# Patient Record
Sex: FEMALE | Race: BLACK OR AFRICAN AMERICAN | Employment: OTHER | URBAN - METROPOLITAN AREA
[De-identification: names, ages, dates, MRNs, and addresses within clinical notes are randomized per-mention and may not be internally consistent; named-entity substitution may affect disease eponyms.]

---

## 2017-10-03 ENCOUNTER — HOSPITAL ENCOUNTER (OUTPATIENT)
Dept: LAB | Age: 37
Discharge: HOME | End: 2017-10-03

## 2017-10-03 PROCEDURE — 88305 TISSUE EXAM BY PATHOLOGIST: CPT | Performed by: INTERNAL MEDICINE

## 2018-04-28 ENCOUNTER — HOSPITAL ENCOUNTER (EMERGENCY)
Age: 38
Discharge: HOME | End: 2018-04-28
Attending: EMERGENCY MEDICINE
Payer: MEDICARE

## 2018-04-28 VITALS
SYSTOLIC BLOOD PRESSURE: 126 MMHG | HEART RATE: 96 BPM | BODY MASS INDEX: 30.12 KG/M2 | OXYGEN SATURATION: 99 % | DIASTOLIC BLOOD PRESSURE: 68 MMHG | WEIGHT: 170 LBS | TEMPERATURE: 98.6 F | HEIGHT: 63 IN | RESPIRATION RATE: 16 BRPM

## 2018-04-28 DIAGNOSIS — R22.0 LIP SWELLING: ICD-10-CM

## 2018-04-28 DIAGNOSIS — R59.0 LEFT CERVICAL LYMPHADENOPATHY: ICD-10-CM

## 2018-04-28 DIAGNOSIS — T78.40XA ALLERGIC REACTION, INITIAL ENCOUNTER: Primary | ICD-10-CM

## 2018-04-28 LAB — HCG UR QL: NEGATIVE

## 2018-04-28 PROCEDURE — 96372 THER/PROPH/DIAG INJ SC/IM: CPT | Performed by: EMERGENCY MEDICINE

## 2018-04-28 PROCEDURE — 81025 URINE PREGNANCY TEST: CPT

## 2018-04-28 PROCEDURE — 74011250636 HC RX REV CODE- 250/636: Performed by: EMERGENCY MEDICINE

## 2018-04-28 PROCEDURE — 99284 EMERGENCY DEPT VISIT MOD MDM: CPT | Performed by: EMERGENCY MEDICINE

## 2018-04-28 PROCEDURE — 74011250637 HC RX REV CODE- 250/637: Performed by: EMERGENCY MEDICINE

## 2018-04-28 PROCEDURE — 81003 URINALYSIS AUTO W/O SCOPE: CPT | Performed by: EMERGENCY MEDICINE

## 2018-04-28 RX ORDER — RANITIDINE 150 MG/1
150 TABLET, FILM COATED ORAL 2 TIMES DAILY
Qty: 14 TAB | Refills: 0 | Status: SHIPPED | OUTPATIENT
Start: 2018-04-28 | End: 2018-05-05

## 2018-04-28 RX ORDER — FAMOTIDINE 20 MG/1
20 TABLET, FILM COATED ORAL
Status: COMPLETED | OUTPATIENT
Start: 2018-04-28 | End: 2018-04-28

## 2018-04-28 RX ORDER — PREDNISONE 20 MG/1
60 TABLET ORAL DAILY
Qty: 12 TAB | Refills: 0 | Status: SHIPPED | OUTPATIENT
Start: 2018-04-28 | End: 2018-05-02

## 2018-04-28 RX ORDER — DIPHENHYDRAMINE HCL 25 MG
25 CAPSULE ORAL
Status: COMPLETED | OUTPATIENT
Start: 2018-04-28 | End: 2018-04-28

## 2018-04-28 RX ADMIN — METHYLPREDNISOLONE SODIUM SUCCINATE 125 MG: 125 INJECTION, POWDER, FOR SOLUTION INTRAMUSCULAR; INTRAVENOUS at 14:32

## 2018-04-28 RX ADMIN — FAMOTIDINE 20 MG: 20 TABLET, FILM COATED ORAL at 14:32

## 2018-04-28 RX ADMIN — DIPHENHYDRAMINE HYDROCHLORIDE 25 MG: 25 CAPSULE ORAL at 14:32

## 2018-04-28 NOTE — ED PROVIDER NOTES
HPI Comments: 31 yo F presents w/ c/o mild upper lip swelling that was present upon waking up this morning & tender lymph node on side of L neck that has been present for past several days. Denies voice change, stridor, wheezing, SOB, difficulty swallowing, pain with swallowing, fever, chills, CP, neck pain, tongue swelling, nausea, vomiting, dizziness, weakness, sore throat, weight loss, cough, rash. Denies history of previous allergic reactions and states she has never experienced similar symptoms in the past. Denies history of environmental, food, or medication allergies. Denies any recent new exposures. Later states she believes she had mild upper lip swelling after drinking [place] beer last night and then went to sleep. States she is not on Lisinopril and does not have history of angioedema. Patient is a 30 y.o. female presenting with facial swelling. The history is provided by the patient. Facial Swelling    The incident occurred 3 to 5 hours ago. She came to the ER via walk-in. The volume of blood lost was none. The pain is at a severity of 0/10. The patient is experiencing no pain. The pain has been improving since the injury. Pertinent negatives include no numbness, no vomiting and no weakness. There was no loss of consciousness.         Past Medical History:   Diagnosis Date    Anxiety     Anxiety, generalized     Asthma     Chronic back pain     Depression     Other unknown and unspecified cause of morbidity or mortality     tremors   headache    Postpartum depression     Sickle cell disease (HCC)     sickle cell trait       Past Surgical History:   Procedure Laterality Date     DELIVERY ONLY      first pregnancy    HX GYN      c- section    HX OTHER SURGICAL      laparoscopy for endometriosis; arthritis in neck and back         Family History:   Problem Relation Age of Onset    Arthritis-osteo Mother     Asthma Mother     Diabetes Mother     Hypertension Mother    [address] [facility] Psychiatric Disorder Mother     Hypertension Father     Asthma Brother     Lung Disease Maternal Grandmother     Cancer Maternal Grandfather     Lung Disease Maternal Grandfather     Lung Disease Paternal Grandmother     Cancer Paternal Grandfather     Lung Disease Paternal Grandfather        Social History     Social History    Marital status: SINGLE     Spouse name: N/A    Number of children: N/A    Years of education: N/A     Occupational History    Not on file. Social History Main Topics    Smoking status: Never Smoker    Smokeless tobacco: Never Used    Alcohol use No    Drug use: No    Sexual activity: No     Other Topics Concern    Not on file     Social History Narrative         ALLERGIES: Amoxicillin; Imitrex [sumatriptan succinate]; Prilosec [omeprazole]; and Sulfa (sulfonamide antibiotics)    Review of Systems   Constitutional: Negative for chills, fatigue and fever. HENT: Positive for facial swelling. Negative for congestion, drooling, rhinorrhea, sore throat, trouble swallowing and voice change. Respiratory: Negative for cough, shortness of breath, wheezing and stridor. Cardiovascular: Negative for chest pain. Gastrointestinal: Negative for abdominal pain, diarrhea, nausea and vomiting. Genitourinary: Negative for dysuria and flank pain. Musculoskeletal: Negative for myalgias, neck pain and neck stiffness. Skin: Negative for rash and wound. Allergic/Immunologic: Negative for environmental allergies and food allergies. Neurological: Negative for dizziness, syncope, weakness, numbness and headaches. Psychiatric/Behavioral: Negative for confusion. Vitals:    04/28/18 1320   BP: 133/86   Pulse: 93   Resp: 16   Temp: 98.9 °F (37.2 °C)   SpO2: 98%   Weight: 77.1 kg (170 lb)   Height: 5' 3\" (1.6 m)            Physical Exam   Constitutional: She is oriented to person, place, and time. Pt sitting up in bed in NAD. HENT:   Head: Normocephalic.    Right Ear: Hearing, tympanic membrane, external ear and ear canal normal.   Left Ear: Hearing, tympanic membrane, external ear and ear canal normal.   Mouth/Throat: Uvula is midline and oropharynx is clear and moist. No trismus in the jaw. No uvula swelling. No oropharyngeal exudate, posterior oropharyngeal edema, posterior oropharyngeal erythema or tonsillar abscesses. Uvula midline. No tonsillar erythema or exudate noted. No posterior oropharynx edema or swelling noted. No stridor or trismus present. No muffled voice. No tongue swelling. Pt tolerating secretions. Minimal/limited swelling noted to upper lip. Eyes: Conjunctivae and EOM are normal. Pupils are equal, round, and reactive to light. Neck: Normal range of motion. No JVD present. No tracheal deviation present. Tender L cervical LAD; one tender lymph node noted to cervical chain. No evidence of abscess or fluctuance. No overlying erythema or warmth. FROM. Cardiovascular: Normal rate, regular rhythm, normal heart sounds and intact distal pulses. Pulmonary/Chest: Effort normal and breath sounds normal.   CTAB. No wheezes or stridor noted. Abdominal: Soft. There is no tenderness. There is no rebound. Musculoskeletal: Normal range of motion. She exhibits no edema. Lymphadenopathy:     She has cervical adenopathy. Neurological: She is alert and oriented to person, place, and time. No cranial nerve deficit. Coordination normal.   Skin: No rash noted. No erythema. No urticaria noted. Nursing note and vitals reviewed. MDM  Number of Diagnoses or Management Options  Allergic reaction, initial encounter: minor  Left cervical lymphadenopathy: minor  Lip swelling:   Diagnosis management comments: VSS. Pt well appearing. No hx of allergic reaction in the past.  Pt with limited upper lip swelling (very minimal swelling noted). No other edema/swelling noted to lower lip, tongue, oropharynx. No rash noted on exam; no urticaria.  No wheezing, stridor. Lungs CTAB. Pt administered Solu-Medrol 125mg IM, Pepcid, and Benadryl. Pt monitored for >2 hours with improvement of symptoms. Will d/c home with Prednisone taper and Zantac. Pt given strict return precautions and things to look out for.        Amount and/or Complexity of Data Reviewed  Clinical lab tests: ordered and reviewed  Review and summarize past medical records: yes    Risk of Complications, Morbidity, and/or Mortality  Presenting problems: low  Diagnostic procedures: low  Management options: low    Patient Progress  Patient progress: stable        ED Course       Procedures    ,  Results Include:    Recent Results (from the past 24 hour(s))   HCG URINE, QL. - POC    Collection Time: 04/28/18  2:25 PM   Result Value Ref Range    Pregnancy test,urine (POC) NEGATIVE  NEG

## 2018-04-28 NOTE — DISCHARGE INSTRUCTIONS
Swollen Lymph Nodes: Care Instructions  Your Care Instructions    Lymph nodes are small, bean-shaped glands throughout the body. They help your body fight germs and infections. Lymph nodes often swell when there is a problem such as an injury, infection, or tumor. · The nodes in your neck, under your chin, or behind your ears may swell when you have a cold or sore throat. · An injury or infection in a leg or foot can make the nodes in your groin swell. · Sometimes medicine can make lymph nodes swell, but this is rare. Treatment depends on what caused your nodes to swell. Usually the nodes return to normal size without a problem. Follow-up care is a key part of your treatment and safety. Be sure to make and go to all appointments, and call your doctor if you are having problems. It's also a good idea to know your test results and keep a list of the medicines you take. How can you care for yourself at home? · Take your medicines exactly as prescribed. Call your doctor if you think you are having a problem with your medicine. · Avoid irritation. ¨ Do not squeeze or pick at the lump. ¨ Do not stick a needle in it. · Prevent infection. Do not squeeze, drain, or puncture a painful lump. Doing this can irritate or inflame the lump, push any existing infection deeper into the skin, or cause severe bleeding. · Get extra rest. Slow down just a little from your usual routine. · Drink plenty of fluids, enough so that your urine is light yellow or clear like water. If you have kidney, heart, or liver disease and have to limit fluids, talk with your doctor before you increase the amount of fluids you drink. · Take an over-the-counter pain medicine, such as acetaminophen (Tylenol), ibuprofen (Advil, Motrin), or naproxen (Aleve). Read and follow all instructions on the label. · Do not take two or more pain medicines at the same time unless the doctor told you to.  Many pain medicines have acetaminophen, which is Tylenol. Too much acetaminophen (Tylenol) can be harmful. When should you call for help? Call your doctor now or seek immediate medical care if:  ? · You have worse symptoms of infection, such as:  ¨ Increased pain, swelling, warmth, or redness. ¨ Red streaks leading from the area. ¨ Pus draining from the area. ¨ A fever. ? Watch closely for changes in your health, and be sure to contact your doctor if:  ? · Your lymph nodes do not get smaller or do not return to normal.   ? · You do not get better as expected. Where can you learn more? Go to http://krystal-fausto.info/. Enter E739 in the search box to learn more about \"Swollen Lymph Nodes: Care Instructions. \"  Current as of: March 3, 2017  Content Version: 11.4  © 1327-9798 [employer]. Care instructions adapted under license by [employer] (which disclaims liability or warranty for this information). If you have questions about a medical condition or this instruction, always ask your healthcare professional. [place] [address] any warranty or liability for your use of this information. Allergic Reaction: Care Instructions  Your Care Instructions    An allergic reaction is an excessive response from your immune system to a medicine, chemical, food, insect bite, or other substance. A reaction can range from mild to life-threatening. Some people have a mild rash, hives, and itching or stomach cramps. In severe reactions, swelling of your tongue and throat can close up your airway so that you cannot breathe. Follow-up care is a key part of your treatment and safety. Be sure to make and go to all appointments, and call your doctor if you are having problems. It's also a good idea to know your test results and keep a list of the medicines you take. How can you care for yourself at home? · If you know what caused your allergic reaction, be sure to avoid it.  Your allergy may become more severe each time you have a reaction. · Take an over-the-counter antihistamine, such as cetirizine (Zyrtec) or loratadine (Claritin), to treat mild symptoms. Read and follow directions on the label. Some antihistamines can make you feel sleepy. Do not give antihistamines to a child unless you have checked with your doctor first. Mild symptoms include sneezing or an itchy or runny nose; an itchy mouth; a few hives or mild itching; and mild nausea or stomach discomfort. · Do not scratch hives or a rash. Put a cold, moist towel on them or take cool baths to relieve itching. Put ice packs on hives, swelling, or insect stings for 10 to 15 minutes at a time. Put a thin cloth between the ice pack and your skin. Do not take hot baths or showers. They will make the itching worse. · Your doctor may prescribe a shot of epinephrine to carry with you in case you have a severe reaction. Learn how to give yourself the shot and keep it with you at all times. Make sure it is not . · Go to the emergency room every time you have a severe reaction, even if you have used your shot of epinephrine and are feeling better. Symptoms can come back after a shot. · Wear medical alert jewelry that lists your allergies. You can buy this at most [employer]. · If your child has a severe allergy, make sure that his or her teachers, babysitters, coaches, and other caregivers know about the allergy. They should have an epinephrine shot, know how and when to give it, and have a plan to take your child to the hospital.  When should you call for help? Give an epinephrine shot if:  ? · You think you are having a severe allergic reaction. ? · You have symptoms in more than one body area, such as mild nausea and an itchy mouth. ? After giving an epinephrine shot call 911, even if you feel better. ?Call 911 if:  ? · You have symptoms of a severe allergic reaction.  These may include:  ¨ Sudden raised, red areas (hives) all over your body.  ¨ Swelling of the throat, mouth, lips, or tongue. ¨ Trouble breathing. ¨ Passing out (losing consciousness). Or you may feel very lightheaded or suddenly feel weak, confused, or restless. ? · You have been given an epinephrine shot, even if you feel better. ?Call your doctor now or seek immediate medical care if:  ? · You have symptoms of an allergic reaction, such as:  ¨ A rash or hives (raised, red areas on the skin). ¨ Itching. ¨ Swelling. ¨ Belly pain, nausea, or vomiting. ? Watch closely for changes in your health, and be sure to contact your doctor if:  ? · You do not get better as expected. Where can you learn more? Go to http://krystal-fausto.info/. Enter R476 in the search box to learn more about \"Allergic Reaction: Care Instructions. \"  Current as of: September 29, 2016  Content Version: 11.4  © 2818-4116 [employer] Care instructions adapted under license by [employer] (which disclaims liability or warranty for this information). If you have questions about a medical condition or this instruction, always ask your healthcare professional. [place] [address] any warranty or liability for your use of this information.

## 2018-04-28 NOTE — ED TRIAGE NOTES
Patient complaining of swelling to upper lip that was present when she woke up, advises \"itching\" to tongue and throat. Patient denies any shortness of breath at this time. Patient advises she noted knot present below left ear yesterday and states she has been having a knot under right arm for a \"while\".

## 2018-04-28 NOTE — ED NOTES
I have reviewed discharge instructions with the patient. The patient verbalized understanding. Patient left ED via Discharge Method: ambulatory to Home with self. Opportunity for questions and clarification provided. Patient given 2 scripts. To continue your aftercare when you leave the hospital, you may receive an automated call from our care team to check in on how you are doing. This is a free service and part of our promise to provide the best care and service to meet your aftercare needs.  If you have questions, or wish to unsubscribe from this service please call [phone]. Thank you for Choosing our [facility] Emergency Department.

## 2018-11-06 ENCOUNTER — HOSPITAL ENCOUNTER (EMERGENCY)
Age: 38
Discharge: HOME | End: 2018-11-06
Attending: EMERGENCY MEDICINE
Payer: MEDICARE

## 2018-11-06 VITALS
TEMPERATURE: 97.8 F | HEIGHT: 63 IN | DIASTOLIC BLOOD PRESSURE: 86 MMHG | SYSTOLIC BLOOD PRESSURE: 132 MMHG | RESPIRATION RATE: 16 BRPM | WEIGHT: 170 LBS | OXYGEN SATURATION: 100 % | BODY MASS INDEX: 30.12 KG/M2 | HEART RATE: 82 BPM

## 2018-11-06 DIAGNOSIS — M54.50 ACUTE MIDLINE LOW BACK PAIN WITHOUT SCIATICA: Primary | ICD-10-CM

## 2018-11-06 LAB
BACTERIA URNS QL MICRO: 0 /HPF
CASTS URNS QL MICRO: NORMAL /LPF
EPI CELLS #/AREA URNS HPF: NORMAL /HPF
HCG UR QL: NEGATIVE
RBC #/AREA URNS HPF: 0 /HPF
WBC URNS QL MICRO: NORMAL /HPF

## 2018-11-06 PROCEDURE — 99284 EMERGENCY DEPT VISIT MOD MDM: CPT | Performed by: EMERGENCY MEDICINE

## 2018-11-06 PROCEDURE — 81003 URINALYSIS AUTO W/O SCOPE: CPT | Performed by: EMERGENCY MEDICINE

## 2018-11-06 PROCEDURE — 74011250636 HC RX REV CODE- 250/636: Performed by: EMERGENCY MEDICINE

## 2018-11-06 PROCEDURE — 81001 URINALYSIS AUTO W/SCOPE: CPT

## 2018-11-06 PROCEDURE — 81025 URINE PREGNANCY TEST: CPT

## 2018-11-06 PROCEDURE — 74011250637 HC RX REV CODE- 250/637: Performed by: EMERGENCY MEDICINE

## 2018-11-06 PROCEDURE — 87591 N.GONORRHOEAE DNA AMP PROB: CPT

## 2018-11-06 PROCEDURE — 96372 THER/PROPH/DIAG INJ SC/IM: CPT | Performed by: EMERGENCY MEDICINE

## 2018-11-06 RX ORDER — AZITHROMYCIN 250 MG/1
1000 TABLET, FILM COATED ORAL
Status: COMPLETED | OUTPATIENT
Start: 2018-11-06 | End: 2018-11-06

## 2018-11-06 RX ORDER — TRAMADOL HYDROCHLORIDE 50 MG/1
50 TABLET ORAL
Qty: 12 TAB | Refills: 0 | Status: SHIPPED | OUTPATIENT
Start: 2018-11-06 | End: 2019-12-16

## 2018-11-06 RX ORDER — DEXLANSOPRAZOLE 60 MG/1
CAPSULE, DELAYED RELEASE ORAL DAILY
COMMUNITY

## 2018-11-06 RX ORDER — PROPRANOLOL HYDROCHLORIDE 20 MG/1
20 TABLET ORAL 3 TIMES DAILY
COMMUNITY

## 2018-11-06 RX ORDER — LOVASTATIN 20 MG/1
20 TABLET ORAL DAILY
COMMUNITY

## 2018-11-06 RX ORDER — METHOCARBAMOL 750 MG/1
750 TABLET, FILM COATED ORAL
Qty: 20 TAB | Refills: 0 | Status: SHIPPED | OUTPATIENT
Start: 2018-11-06 | End: 2019-12-16

## 2018-11-06 RX ADMIN — AZITHROMYCIN 1000 MG: 250 TABLET, FILM COATED ORAL at 12:04

## 2018-11-06 RX ADMIN — LIDOCAINE HYDROCHLORIDE 250 MG: 10 INJECTION, SOLUTION EPIDURAL; INFILTRATION; INTRACAUDAL; PERINEURAL at 12:04

## 2018-11-06 NOTE — ED PROVIDER NOTES
The history is provided by the patient. Back Pain This is a new problem. The current episode started 12 to 24 hours ago. The problem has been gradually worsening. The problem occurs constantly. Patient reports not work related injury. The pain is associated with no known injury. The pain is present in the lower back. The quality of the pain is described as sharp and aching. The pain does not radiate. The pain is moderate. The symptoms are aggravated by bending, twisting and certain positions. The pain is the same all the time. Stiffness is present all day. Associated symptoms include dysuria (slight). Pertinent negatives include no chest pain, no fever, no numbness, no weight loss, no headaches, no abdominal pain, no abdominal swelling, no bowel incontinence, no perianal numbness, no bladder incontinence, no pelvic pain, no leg pain, no paresthesias, no paresis, no tingling and no weakness. She has tried bed rest for the symptoms. The treatment provided no relief. The patient's surgical history non-contributory Past Medical History:  
Diagnosis Date  Anxiety  Anxiety, generalized  Asthma  Chronic back pain  Depression  Other unknown and unspecified cause of morbidity or mortality   
 tremors   headache  Postpartum depression  Sickle cell disease ([facility] [address] sickle cell trait Past Surgical History:  
Procedure Laterality Date   DELIVERY ONLY    
 first pregnancy  HX CHOLECYSTECTOMY  HX GYN    
 c- section  HX OTHER SURGICAL    
 laparoscopy for endometriosis; arthritis in neck and back Family History:  
Problem Relation Age of Onset Shellie Means Arthritis-osteo Mother  Asthma Mother  Diabetes Mother  Hypertension Mother  Psychiatric Disorder Mother  Hypertension Father  Asthma Brother  Lung Disease Maternal Grandmother  Cancer Maternal Grandfather  Lung Disease Maternal Grandfather  Lung Disease Paternal Grandmother  Cancer Paternal Grandfather  Lung Disease Paternal Grandfather Social History Socioeconomic History  Marital status: SINGLE Spouse name: Not on file  Number of children: Not on file  Years of education: Not on file  Highest education level: Not on file Social Needs  Financial resource strain: Not on file  Food insecurity - worry: Not on file  Food insecurity - inability: Not on file  Transportation needs - medical: Not on file  Transportation needs - non-medical: Not on file Occupational History  Not on file Tobacco Use  Smoking status: Never Smoker  Smokeless tobacco: Never Used Substance and Sexual Activity  Alcohol use: No  
 Drug use: No  
 Sexual activity: No  
  Partners: Male Birth control/protection: None, Injection Other Topics Concern  Not on file Social History Narrative  Not on file ALLERGIES: Amoxicillin; Imitrex [sumatriptan succinate]; Prilosec [omeprazole]; and Sulfa (sulfonamide antibiotics) Review of Systems Constitutional: Negative for chills, fever and weight loss. Cardiovascular: Negative for chest pain. Gastrointestinal: Negative for abdominal pain and bowel incontinence. Genitourinary: Positive for dysuria (slight). Negative for bladder incontinence, pelvic pain, vaginal bleeding, vaginal discharge and vaginal pain. Musculoskeletal: Positive for back pain. Neurological: Negative for tingling, weakness, numbness, headaches and paresthesias. All other systems reviewed and are negative. Vitals:  
 11/06/18 1035 BP: 137/88 Pulse: 85 Resp: 16 Temp: 98.2 °F (36.8 °C) SpO2: 99% Weight: 77.1 kg (170 lb) Height: 5' 3\" (1.6 m) Physical Exam  
Constitutional: She is oriented to person, place, and time. She appears well-developed and well-nourished. No distress. HENT:  
Head: Normocephalic and atraumatic. Right Ear: External ear normal.  
Left Ear: External ear normal.  
Mouth/Throat: Oropharynx is clear and moist.  
Eyes: Conjunctivae and EOM are normal. Pupils are equal, round, and reactive to light. Neck: Normal range of motion. Neck supple. Cardiovascular: Normal rate, regular rhythm, normal heart sounds and intact distal pulses. Pulmonary/Chest: Effort normal and breath sounds normal.  
Abdominal: Soft. Bowel sounds are normal. There is no tenderness. Musculoskeletal: She exhibits no edema. Lumbar back: She exhibits decreased range of motion and tenderness. She exhibits no swelling, no edema, no deformity, no laceration, no spasm and normal pulse. Neurological: She is alert and oriented to person, place, and time. She has normal strength. She displays normal reflexes. No cranial nerve deficit or sensory deficit. Coordination and gait normal.  
Negative straight leg raise bilaterally Skin: Skin is warm and dry. Capillary refill takes less than 2 seconds. Psychiatric: She has a normal mood and affect. Nursing note and vitals reviewed. MDM Number of Diagnoses or Management Options Acute midline low back pain without sciatica: new and requires workup Amount and/or Complexity of Data Reviewed Clinical lab tests: ordered and reviewed Review and summarize past medical records: yes Risk of Complications, Morbidity, and/or Mortality Presenting problems: moderate Diagnostic procedures: minimal 
Management options: moderate Patient Progress Patient progress: stable Procedures Results Reviewed: 
 
 
Recent Results (from the past 24 hour(s)) HCG URINE, QL. - POC Collection Time: 11/06/18 10:50 AM  
Result Value Ref Range Pregnancy test,urine (POC) NEGATIVE  NEG    
URINE MICROSCOPIC Collection Time: 11/06/18 10:52 AM  
Result Value Ref Range WBC 0-3 0 /hpf  
 RBC 0 0 /hpf Epithelial cells 0-3 0 /hpf Bacteria 0 0 /hpf Casts 0-3 0 /lpf I discussed the results of all labs, procedures, radiographs, and treatments with the patient and available family. Treatment plan is agreed upon and the patient is ready for discharge. All voiced understanding of the discharge plan and medication instructions or changes as appropriate. Questions about treatment in the ED were answered. All were encouraged to return should symptoms worsen or new problems develop.

## 2018-11-06 NOTE — ED NOTES
I have reviewed discharge instructions with the patient. The patient verbalized understanding. Patient left ED via Discharge Method: ambulatory to Home with self. Opportunity for questions and clarification provided. Patient given 2 scripts. To continue your aftercare when you leave the hospital, you may receive an automated call from our care team to check in on how you are doing. This is a free service and part of our promise to provide the best care and service to meet your aftercare needs.  If you have questions, or wish to unsubscribe from this service please call [phone]. Thank you for Choosing our [facility] Emergency Department.

## 2018-11-06 NOTE — DISCHARGE INSTRUCTIONS
Learning About Relief for Back Pain  What is back tension and strain? Back strain happens when you overstretch, or pull, a muscle in your back. You may hurt your back in an accident or when you exercise or lift something. Most back pain will get better with rest and time. You can take care of yourself at home to help your back heal.  What can you do first to relieve back pain? When you first feel back pain, try these steps:  · Walk. Take a short walk (10 to 20 minutes) on a level surface (no slopes, hills, or stairs) every 2 to 3 hours. Walk only distances you can manage without pain, especially leg pain. · Relax. Find a comfortable position for rest. Some people are comfortable on the floor or a medium-firm bed with a small pillow under their head and another under their knees. Some people prefer to lie on their side with a pillow between their knees. Don't stay in one position for too long. · Try heat or ice. Try using a heating pad on a low or medium setting, or take a warm shower, for 15 to 20 minutes every 2 to 3 hours. Or you can buy single-use heat wraps that last up to 8 hours. You can also try an ice pack for 10 to 15 minutes every 2 to 3 hours. You can use an ice pack or a bag of frozen vegetables wrapped in a thin towel. There is not strong evidence that either heat or ice will help, but you can try them to see if they help. You may also want to try switching between heat and cold. · Take pain medicine exactly as directed. ¨ If the doctor gave you a prescription medicine for pain, take it as prescribed. ¨ If you are not taking a prescription pain medicine, ask your doctor if you can take an over-the-counter medicine. What else can you do? · Stretch and exercise. Exercises that increase flexibility may relieve your pain and make it easier for your muscles to keep your spine in a good, neutral position. And don't forget to keep walking. · Do self-massage.  You can use self-massage to unwind after work or school or to energize yourself in the morning. You can easily massage your feet, hands, or neck. Self-massage works best if you are in comfortable clothes and are sitting or lying in a comfortable position. Use oil or lotion to massage bare skin. · Reduce stress. Back pain can lead to a vicious [place]: Distress about the pain tenses the muscles in your back, which in turn causes more pain. Learn how to relax your mind and your muscles to lower your stress. Where can you learn more? Go to http://krystal-fausto.info/. Enter J845 in the search box to learn more about \"Learning About Relief for Back Pain. \"  Current as of: March 21, 2017  Content Version: 11.5  © 0091-1516 [employer]. Care instructions adapted under license by [employer] (which disclaims liability or warranty for this information). If you have questions about a medical condition or this instruction, always ask your healthcare professional. [place] [address] any warranty or liability for your use of this information. Low Back Pain: Exercises  Your Care Instructions  Here are some examples of typical rehabilitation exercises for your condition. Start each exercise slowly. Ease off the exercise if you start to have pain. Your doctor or physical therapist will tell you when you can start these exercises and which ones will work best for you. How to do the exercises  Press-up    1. Lie on your stomach, supporting your body with your forearms. 2. Press your elbows down into the floor to raise your upper back. As you do this, relax your stomach muscles and allow your back to arch without using your back muscles. As your press up, do not let your hips or pelvis come off the floor. 3. Hold for 15 to 30 seconds, then relax. 4. Repeat 2 to 4 times. Alternate arm and leg (bird dog) exercise    1. Start on the floor, on your hands and knees.   2. Tighten your belly muscles. 3. Raise one leg off the floor, and hold it straight out behind you. Be careful not to let your hip drop down, because that will twist your trunk. 4. Hold for about 6 seconds, then lower your leg and switch to the other leg. 5. Repeat 8 to 12 times on each leg. 6. Over time, work up to holding for 10 to 30 seconds each time. 7. If you feel stable and secure with your leg raised, try raising the opposite arm straight out in front of you at the same time. Knee-to-chest exercise    1. Lie on your back with your knees bent and your feet flat on the floor. 2. Bring one knee to your chest, keeping the other foot flat on the floor (or keeping the other leg straight, whichever feels better on your lower back). 3. Keep your lower back pressed to the floor. Hold for at least 15 to 30 seconds. 4. Relax, and lower the knee to the starting position. 5. Repeat with the other leg. Repeat 2 to 4 times with each leg. 6. To get more stretch, put your other leg flat on the floor while pulling your knee to your chest.    Curl-ups    1. Lie on the floor on your back with your knees bent at a 90-degree angle. Your feet should be flat on the floor, about 12 inches from your buttocks. 2. Cross your arms over your chest. If this bothers your neck, try putting your hands behind your neck (not your head), with your elbows spread apart. 3. Slowly tighten your belly muscles and raise your shoulder blades off the floor. 4. Keep your head in line with your body, and do not press your chin to your chest.  5. Hold this position for 1 or 2 seconds, then slowly lower yourself back down to the floor. 6. Repeat 8 to 12 times. Pelvic tilt exercise    1. Lie on your back with your knees bent. 2. \"Brace\" your stomach. This means to tighten your muscles by pulling in and imagining your belly button moving toward your spine.  You should feel like your back is pressing to the floor and your hips and pelvis are rocking back.  3. Hold for about 6 seconds while you breathe smoothly. 4. Repeat 8 to 12 times. Heel dig bridging    1. Lie on your back with both knees bent and your ankles bent so that only your heels are digging into the floor. Your knees should be bent about 90 degrees. 2. Then push your heels into the floor, squeeze your buttocks, and lift your hips off the floor until your shoulders, hips, and knees are all in a straight line. 3. Hold for about 6 seconds as you continue to breathe normally, and then slowly lower your hips back down to the floor and rest for up to 10 seconds. 4. Do 8 to 12 repetitions. Hamstring stretch in doorway    1. Lie on your back in a doorway, with one leg through the open door. 2. Slide your leg up the wall to straighten your knee. You should feel a gentle stretch down the back of your leg. 3. Hold the stretch for at least 15 to 30 seconds. Do not arch your back, point your toes, or bend either knee. Keep one heel touching the floor and the other heel touching the wall. 4. Repeat with your other leg. 5. Do 2 to 4 times for each leg. Hip flexor stretch    1. Kneel on the floor with one knee bent and one leg behind you. Place your forward knee over your foot. Keep your other knee touching the floor. 2. Slowly push your hips forward until you feel a stretch in the upper thigh of your rear leg. 3. Hold the stretch for at least 15 to 30 seconds. Repeat with your other leg. 4. Do 2 to 4 times on each side. Wall sit    1. Stand with your back 10 to 12 inches away from a wall. 2. Lean into the wall until your back is flat against it. 3. Slowly slide down until your knees are slightly bent, pressing your lower back into the wall. 4. Hold for about 6 seconds, then slide back up the wall. 5. Repeat 8 to 12 times. Follow-up care is a key part of your treatment and safety. Be sure to make and go to all appointments, and call your doctor if you are having problems.  It's also a good idea to know your test results and keep a list of the medicines you take. Where can you learn more? Go to http://krystal-fausto.info/. Enter A639 in the search box to learn more about \"Low Back Pain: Exercises. \"  Current as of: November 29, 2017  Content Version: 11.8  © 1071-1851 [employer], [employer]. Care instructions adapted under license by [employer] (which disclaims liability or warranty for this information). If you have questions about a medical condition or this instruction, always ask your healthcare professional. [address] any warranty or liability for your use of this information.

## 2018-11-08 LAB
C TRACH RRNA SPEC QL NAA+PROBE: NEGATIVE
N GONORRHOEA RRNA SPEC QL NAA+PROBE: NEGATIVE
SPECIMEN SOURCE: NORMAL

## 2019-05-15 ENCOUNTER — HOSPITAL ENCOUNTER (EMERGENCY)
Age: 39
Discharge: LEFT BEFORE DISCHARGE | End: 2019-05-16
Attending: EMERGENCY MEDICINE
Payer: MEDICARE

## 2019-05-15 PROCEDURE — 75810000275 HC EMERGENCY DEPT VISIT NO LEVEL OF CARE: Performed by: EMERGENCY MEDICINE

## 2019-07-18 PROCEDURE — 88312 SPECIAL STAINS GROUP 1: CPT

## 2019-07-18 PROCEDURE — 88305 TISSUE EXAM BY PATHOLOGIST: CPT

## 2019-07-19 ENCOUNTER — HOSPITAL ENCOUNTER (OUTPATIENT)
Dept: LAB | Age: 39
Discharge: HOME | End: 2019-07-19

## 2019-12-16 ENCOUNTER — HOSPITAL ENCOUNTER (EMERGENCY)
Age: 39
Discharge: HOME | End: 2019-12-16
Attending: EMERGENCY MEDICINE
Payer: MEDICARE

## 2019-12-16 VITALS
SYSTOLIC BLOOD PRESSURE: 110 MMHG | WEIGHT: 165 LBS | BODY MASS INDEX: 29.23 KG/M2 | HEIGHT: 63 IN | OXYGEN SATURATION: 98 % | RESPIRATION RATE: 18 BRPM | HEART RATE: 68 BPM | TEMPERATURE: 98.4 F | DIASTOLIC BLOOD PRESSURE: 74 MMHG

## 2019-12-16 DIAGNOSIS — B96.89 BV (BACTERIAL VAGINOSIS): ICD-10-CM

## 2019-12-16 DIAGNOSIS — N76.0 BV (BACTERIAL VAGINOSIS): ICD-10-CM

## 2019-12-16 DIAGNOSIS — N72 CERVICITIS: ICD-10-CM

## 2019-12-16 DIAGNOSIS — R82.998 URINE WBC INCREASED: ICD-10-CM

## 2019-12-16 DIAGNOSIS — B37.9 YEAST INFECTION: Primary | ICD-10-CM

## 2019-12-16 LAB
BACTERIA URNS QL MICRO: NORMAL /HPF
CASTS URNS QL MICRO: 0 /LPF
CRYSTALS URNS QL MICRO: 0 /LPF
EPI CELLS #/AREA URNS HPF: NORMAL /HPF
HCG UR QL: NEGATIVE
MUCOUS THREADS URNS QL MICRO: 0 /LPF
RBC #/AREA URNS HPF: NORMAL /HPF
SERVICE CMNT-IMP: NORMAL
WBC URNS QL MICRO: >100 /HPF
WET PREP GENITAL: NORMAL
YEAST URNS QL MICRO: NORMAL

## 2019-12-16 PROCEDURE — 74011000250 HC RX REV CODE- 250: Performed by: PHYSICIAN ASSISTANT

## 2019-12-16 PROCEDURE — 96372 THER/PROPH/DIAG INJ SC/IM: CPT | Performed by: PHYSICIAN ASSISTANT

## 2019-12-16 PROCEDURE — 81025 URINE PREGNANCY TEST: CPT

## 2019-12-16 PROCEDURE — 74011250636 HC RX REV CODE- 250/636: Performed by: PHYSICIAN ASSISTANT

## 2019-12-16 PROCEDURE — 81003 URINALYSIS AUTO W/O SCOPE: CPT | Performed by: PHYSICIAN ASSISTANT

## 2019-12-16 PROCEDURE — 86695 HERPES SIMPLEX TYPE 1 TEST: CPT

## 2019-12-16 PROCEDURE — 99285 EMERGENCY DEPT VISIT HI MDM: CPT | Performed by: PHYSICIAN ASSISTANT

## 2019-12-16 PROCEDURE — 81015 MICROSCOPIC EXAM OF URINE: CPT

## 2019-12-16 PROCEDURE — 86694 HERPES SIMPLEX NES ANTBDY: CPT

## 2019-12-16 PROCEDURE — 87210 SMEAR WET MOUNT SALINE/INK: CPT

## 2019-12-16 PROCEDURE — 87491 CHLMYD TRACH DNA AMP PROBE: CPT

## 2019-12-16 PROCEDURE — 74011250637 HC RX REV CODE- 250/637: Performed by: PHYSICIAN ASSISTANT

## 2019-12-16 PROCEDURE — 87086 URINE CULTURE/COLONY COUNT: CPT

## 2019-12-16 RX ORDER — AZITHROMYCIN 250 MG/1
1000 TABLET, FILM COATED ORAL
Status: COMPLETED | OUTPATIENT
Start: 2019-12-16 | End: 2019-12-16

## 2019-12-16 RX ORDER — METRONIDAZOLE 500 MG/1
500 TABLET ORAL 2 TIMES DAILY
Qty: 20 TAB | Refills: 0 | Status: SHIPPED | OUTPATIENT
Start: 2019-12-16 | End: 2019-12-26

## 2019-12-16 RX ORDER — NITROFURANTOIN 25; 75 MG/1; MG/1
100 CAPSULE ORAL 2 TIMES DAILY
Qty: 20 CAP | Refills: 0 | Status: SHIPPED | OUTPATIENT
Start: 2019-12-16 | End: 2019-12-26

## 2019-12-16 RX ORDER — FLUCONAZOLE 150 MG/1
150 TABLET ORAL
Qty: 2 TAB | Refills: 0 | Status: SHIPPED | OUTPATIENT
Start: 2019-12-16 | End: 2019-12-24

## 2019-12-16 RX ORDER — ACYCLOVIR 800 MG/1
800 TABLET ORAL
Qty: 50 TAB | Refills: 0 | Status: SHIPPED | OUTPATIENT
Start: 2019-12-16 | End: 2019-12-26

## 2019-12-16 RX ORDER — ONDANSETRON 4 MG/1
4 TABLET, ORALLY DISINTEGRATING ORAL
Status: COMPLETED | OUTPATIENT
Start: 2019-12-16 | End: 2019-12-16

## 2019-12-16 RX ORDER — BUPRENORPHINE 5 UG/H
1 PATCH TRANSDERMAL
COMMUNITY
End: 2022-01-13

## 2019-12-16 RX ADMIN — LIDOCAINE HYDROCHLORIDE 250 MG: 10 INJECTION, SOLUTION INFILTRATION; PERINEURAL at 10:08

## 2019-12-16 RX ADMIN — AZITHROMYCIN MONOHYDRATE 1000 MG: 250 TABLET ORAL at 10:07

## 2019-12-16 RX ADMIN — ONDANSETRON 4 MG: 4 TABLET, ORALLY DISINTEGRATING ORAL at 10:31

## 2019-12-16 NOTE — LETTER
[address] [address] EMERGENCY DEPT 
[address] 
[facility] [address] 
[phone] Work/School Note Date: 12/16/2019 To Whom It May concern: 
 
Britney Acosta was seen and treated today in the emergency room by the following provider(s): 
Attending Provider: David nKowles MD 
Physician Assistant: RUSH Mac. Britney Acosta may return to work on 12/17/19. Sincerely, RUSH Pierson

## 2019-12-16 NOTE — ED NOTES
I have reviewed discharge instructions with the patient. The patient verbalized understanding. Patient left ED via Discharge Method: ambulatory to Home with self. Opportunity for questions and clarification provided. Patient given 1 scripts. As well as E-scripts        To continue your aftercare when you leave the hospital, you may receive an automated call from our care team to check in on how you are doing. This is a free service and part of our promise to provide the best care and service to meet your aftercare needs.  If you have questions, or wish to unsubscribe from this service please call [phone]. Thank you for Choosing our [address] [place] [address] Emergency Department.

## 2019-12-16 NOTE — LETTER
12/18/2019 [address] [place] [id number] Dear Ms. Man Carpio, You were recently seen in the Emergency Department of [facility] and had blood work or x-rays performed. We would like to discuss these with you. Please call the Emergency Department at your earliest convenience. If you were seen at [facility], please dial [phone], and if you were seen at [facility], please call [phone] to speak with one of our providers. Sincerely, Clinton Kerns MD 
Medical Director Emergency Services, [address] [place] [address]  
[phone]/ [phone]

## 2019-12-16 NOTE — ED TRIAGE NOTES
Patient advises itching discomfort to vaginal area started on 12/14/2019. Denies any discharge or discomfort with urination.

## 2019-12-16 NOTE — ED PROVIDER NOTES
Patient is here with vaginal itching and burning that started a couple of days ago. She states she had been back with her ex and symptoms began after that. She has not noticed discharge. She is not under a lot of stress. No trouble with urination or bowel movements. No fever, nausea, vomiting, chest pain, shortness of breath, abdominal pain, dizziness, weakness, dyspnea on exertion, orthopnea or other new symptoms. She did ambulate to the room without difficulty and is well-hydrated. The history is provided by the patient. Vaginal Itching    This is a new problem. The current episode started 2 days ago. The problem occurs constantly. The problem has been gradually worsening. The discharge occurs spontaneously. Associated symptoms include genital burning and genital itching. Pertinent negatives include no anorexia, no diaphoresis, no fever, no abdominal swelling, no abdominal pain, no constipation, no diarrhea, no nausea, no vomiting, no dyspareunia, no dysuria, no frequency, no genital lesions, no perineal pain, no perineal odor and no painful intercourse. She has tried nothing for the symptoms.         Past Medical History:   Diagnosis Date    Anxiety     Anxiety, generalized     Asthma     Chronic back pain     Depression     Other unknown and unspecified cause of morbidity or mortality     tremors   headache    Postpartum depression     Sickle cell disease (HCC)     sickle cell trait       Past Surgical History:   Procedure Laterality Date     DELIVERY ONLY      first pregnancy    HX CHOLECYSTECTOMY      HX GYN      c- section    HX OTHER SURGICAL      laparoscopy for endometriosis; arthritis in neck and back         Family History:   Problem Relation Age of Onset    Arthritis-osteo Mother     Asthma Mother     Diabetes Mother     Hypertension Mother     Psychiatric Disorder Mother     Hypertension Father     Asthma Brother     Lung Disease Maternal Grandmother     Cancer Maternal Grandfather     Lung Disease Maternal Grandfather     Lung Disease Paternal Grandmother     Cancer Paternal Grandfather     Lung Disease Paternal Grandfather        Social History     Socioeconomic History    Marital status: SINGLE     Spouse name: Not on file    Number of children: Not on file    Years of education: Not on file    Highest education level: Not on file   Occupational History    Not on file   Social Needs    Financial resource strain: Not on file    Food insecurity:     Worry: Not on file     Inability: Not on file    Transportation needs:     Medical: Not on file     Non-medical: Not on file   Tobacco Use    Smoking status: Never Smoker    Smokeless tobacco: Never Used   Substance and Sexual Activity    Alcohol use: No    Drug use: No    Sexual activity: Never     Partners: Male     Birth control/protection: None, Injection   Lifestyle    Physical activity:     Days per week: Not on file     Minutes per session: Not on file    Stress: Not on file   Relationships    Social connections:     Talks on phone: Not on file     Gets together: Not on file     Attends [employer] service: Not on file     Active member of club or organization: Not on file     Attends meetings of clubs or organizations: Not on file     Relationship status: Not on file    Intimate partner violence:     Fear of current or ex partner: Not on file     Emotionally abused: Not on file     Physically abused: Not on file     Forced sexual activity: Not on file   Other Topics Concern    Not on file   Social History Narrative    Not on file         ALLERGIES: Amoxicillin; Imitrex [sumatriptan succinate]; Prilosec [omeprazole]; and Sulfa (sulfonamide antibiotics)    Review of Systems   Constitutional: Negative. Negative for diaphoresis and fever. HENT: Negative. Eyes: Negative. Respiratory: Negative. Cardiovascular: Negative. Gastrointestinal: Negative.   Negative for abdominal pain, anorexia, constipation, diarrhea, nausea and vomiting. Genitourinary: Positive for genital sores and vaginal pain. Negative for decreased urine volume, difficulty urinating, dyspareunia, dysuria, enuresis, flank pain, frequency, hematuria, menstrual problem, pelvic pain, urgency, vaginal bleeding and vaginal discharge. Musculoskeletal: Negative. Skin: Negative. Neurological: Negative. Psychiatric/Behavioral: Negative. All other systems reviewed and are negative. Vitals:    12/16/19 0831   BP: 120/78   Pulse: 61   Resp: 16   Temp: 98.2 °F (36.8 °C)   SpO2: 96%   Weight: 74.8 kg (165 lb)   Height: 5' 3\" (1.6 m)            Physical Exam  Vitals signs and nursing note reviewed. Exam conducted with a chaperone present. Constitutional:       Appearance: She is well-developed. HENT:      Head: Normocephalic and atraumatic. Right Ear: External ear normal.      Left Ear: External ear normal.      Nose: Nose normal.   Eyes:      Conjunctiva/sclera: Conjunctivae normal.      Pupils: Pupils are equal, round, and reactive to light. Neck:      Musculoskeletal: Normal range of motion and neck supple. Cardiovascular:      Rate and Rhythm: Normal rate and regular rhythm. Heart sounds: Normal heart sounds. Pulmonary:      Effort: Pulmonary effort is normal.      Breath sounds: Normal breath sounds. Abdominal:      General: Bowel sounds are normal.      Palpations: Abdomen is soft. Genitourinary:     Labia:         Right: Rash, tenderness and lesion present. No injury. Left: Rash, tenderness and lesion present. No injury. Vagina: Vaginal discharge (White discharge mild amount) present. Cervix: Normal.      Uterus: Normal.       Adnexa: Right adnexa normal and left adnexa normal.      Comments: Giuseppe Robertson RN into assist with pelvic exam.  Musculoskeletal: Normal range of motion. Skin:     General: Skin is warm and dry.    Neurological:      Mental Status: She is alert and oriented to person, place, and time. Deep Tendon Reflexes: Reflexes are normal and symmetric. Psychiatric:         Behavior: Behavior normal.         Thought Content: Thought content normal.         Judgment: Judgment normal.          MDM  Number of Diagnoses or Management Options     Amount and/or Complexity of Data Reviewed  Clinical lab tests: ordered    Risk of Complications, Morbidity, and/or Mortality  Presenting problems: moderate  Diagnostic procedures: moderate  Management options: moderate    Patient Progress  Patient progress: stable         Procedures    The patient was observed in the ED. Results Reviewed:      Recent Results (from the past 24 hour(s))   HCG URINE, QL. - POC    Collection Time: 12/16/19  9:10 AM   Result Value Ref Range    Pregnancy test,urine (POC) NEGATIVE  NEG     URINE MICROSCOPIC    Collection Time: 12/16/19  9:11 AM   Result Value Ref Range    WBC >100 0 /hpf    RBC 0-3 0 /hpf    Epithelial cells 3-5 0 /hpf    Bacteria TRACE 0 /hpf    Casts 0 0 /lpf    Crystals, urine 0 0 /LPF    Mucus 0 0 /lpf    Yeast OCCASIONAL     WET PREP    Collection Time: 12/16/19  9:24 AM   Result Value Ref Range    Special Requests: NO SPECIAL REQUESTS      Wet prep 25 TO 30 WBCS PER HPF     Wet prep RARE  YEAST        Wet prep OCCASIONAL  CLUE CELLS PRESENT        Wet prep NO TRICHOMONAS SEEN       Patient had yeast in her urine and on her wet prep. She also had a few clue cells and white cells. I we have treated here for gonorrhea and chlamydia prophylactically. I will send her with metronidazole for the bacterial vaginosis and Diflucan for the yeast.  She had greater than 100 white cells in her urine so I have sent it for culture. I have sent her with Lawanna Buerger for that. I have also sent her with acyclovir as she did have a vesicular type rash to her labia and drawn the herpes serologies.   She was instructed to avoid intercourse for at least 10 days and have partner checked and treated if she is going to be back with him. Patient expressed understanding, is stable for discharge and ambulatory out of the ER without difficulty at this time. I discussed the results of all labs, procedures, radiographs, and treatments with the patient and available family. Treatment plan is agreed upon and the patient is ready for discharge. All voiced understanding of the discharge plan and medication instructions or changes as appropriate. Questions about treatment in the ED were answered. All were encouraged to return should symptoms worsen or new problems develop.

## 2019-12-16 NOTE — DISCHARGE INSTRUCTIONS
Patient Education        Bacterial Vaginosis: Care Instructions  Your Care Instructions    Bacterial vaginosis is a type of vaginal infection. It is caused by excess growth of certain bacteria that are normally found in the vagina. Symptoms can include itching, swelling, pain when you urinate or have sex, and a gray or yellow discharge with a \"fishy\" odor. It is not considered an infection that is spread through sexual contact. Although symptoms can be annoying and uncomfortable, bacterial vaginosis does not usually cause other health problems. However, if you have it while you are pregnant, it can cause complications. While the infection may go away on its own, most doctors use antibiotics to treat it. You may have been prescribed pills or vaginal cream. With treatment, bacterial vaginosis usually clears up in 5 to 7 days. Follow-up care is a key part of your treatment and safety. Be sure to make and go to all appointments, and call your doctor if you are having problems. It's also a good idea to know your test results and keep a list of the medicines you take. How can you care for yourself at home? · Take your antibiotics as directed. Do not stop taking them just because you feel better. You need to take the full course of antibiotics. · Do not eat or drink anything that contains alcohol if you are taking metronidazole (Flagyl). · Keep using your medicine if you start your period. Use pads instead of tampons while using a vaginal cream or suppository. Tampons can absorb the medicine. · Wear loose cotton clothing. Do not wear nylon and other materials that hold body heat and moisture close to the skin. · Do not scratch. Relieve itching with a cold pack or a cool bath. · Do not wash your vaginal area more than once a day. Use plain water or a mild, unscented soap. Do not douche. When should you call for help?   Watch closely for changes in your health, and be sure to contact your doctor if:    · You have unexpected vaginal bleeding.     · You have a fever.     · You have new or increased pain in your vagina or pelvis.     · You are not getting better after 1 week.     · Your symptoms return after you finish the course of your medicine. Where can you learn more? Go to http://krystal-fausto.info/. Nicola Echols in the search box to learn more about \"Bacterial Vaginosis: Care Instructions. \"  Current as of: February 19, 2019  Content Version: 12.2  © 1658-3756 [employer]. Care instructions adapted under license by [employer] (which disclaims liability or warranty for this information). If you have questions about a medical condition or this instruction, always ask your healthcare professional. [address] any warranty or liability for your use of this information. Patient Education        Candidiasis: Care Instructions  Your Care Instructions  Candidiasis (say \"xrb-pim-CB-uh-fred\") is a yeast infection. Yeast normally lives in your body. But it can cause problems if your body's defenses don't work as they should. Some medicines can increase your chance of getting a yeast infection. These include antibiotics, steroids, and cancer drugs. And some diseases like AIDS and diabetes can make you more likely to get yeast infections. There are different types of yeast infections. Lisa Coronado is a yeast infection in the mouth. It usually occurs in people with weak immune systems. It causes white patches inside the mouth and throat. Yeast infections of the skin usually occur in skin folds where the skin stays moist. They cause red, oozing patches on your skin. Babies can get these infections under the diaper. People who often wear gloves can get them on their hands. Many women get vaginal yeast infections. They are most common when women take antibiotics. These infections can cause the vagina to itch and burn.  They also cause white discharge that looks like cottage cheese. In rare cases, yeast infects the blood. This can cause serious disease. This kind of infection is treated with medicine given through a needle into a vein (IV). After you start treatment, a yeast infection usually goes away quickly. But if your immune system is weak, the infection may come back. Tell your doctor if you get yeast infections often. Follow-up care is a key part of your treatment and safety. Be sure to make and go to all appointments, and call your doctor if you are having problems. It's also a good idea to know your test results and keep a list of the medicines you take. How can you care for yourself at home? · Take your medicines exactly as prescribed. Call your doctor if you think you are having a problem with your medicine. · Use antibiotics only as directed by your doctor. · Eat yogurt with live cultures. It has bacteria called lactobacillus. It may help prevent some types of yeast infections. · Keep your skin clean and dry. Put powder on moist places. · If you are using a cream or suppository to treat a vaginal yeast infection, don't use condoms or a diaphragm. Use a different type of birth control. · Eat a healthy diet and get regular exercise. This will help keep your immune system strong. When should you call for help? Watch closely for changes in your health, and be sure to contact your doctor if:    · You do not get better as expected. Where can you learn more? Go to http://krystal-fausto.info/. Enter Q248 in the search box to learn more about \"Candidiasis: Care Instructions. \"  Current as of: February 19, 2019  Content Version: 12.2  © 7619-6866 [employer]. Care instructions adapted under license by [employer] (which disclaims liability or warranty for this information).  If you have questions about a medical condition or this instruction, always ask your healthcare professional. Giselle Jasso any warranty or liability for your use of this information. Patient Education        Cervicitis: Care Instructions  Your Care Instructions    Cervicitis means that your cervix is inflamed. The cervix is the part of your uterus that opens into your vagina. This problem is most often caused by an infection. Some women get it after they have a sexually transmitted infection (STI). These include gonorrhea and chlamydia. It can also be caused by irritation from some types of birth control. Two examples are the cervical cap or diaphragm. Your doctor may do some tests to help find the cause of the problem. It is very important to treat cervicitis. If you don't, you could have serious health problems. For this reason, you may need a test after your treatment to make sure the infection is gone. Follow-up care is a key part of your treatment and safety. Be sure to make and go to all appointments, and call your doctor if you are having problems. It's also a good idea to know your test results and keep a list of the medicines you take. How can you care for yourself at home? · Take your antibiotics as directed. Do not stop taking them just because you feel better. You need to take the full course of antibiotics. · If your doctor prescribed antifungal medicine, use it as directed. · While you are being treated, do not have sex. If your treatment is one dose of antibiotics, wait at least 7 days after you take your medicine before you have any kind of sexual contact. Even if you use a condom, you could get infected again. · It's important to tell your sex partner or partners that you have cervicitis. It may be related to an STI. Any partners should get tested and then treated if they have an STI. This is true even if they don't have symptoms. · Do not douche. It can change the normal balance of substances in your vagina. · Do not use tampons while you are being treated.   To prevent STIs  · Use latex condoms every time you have sex. Use them from the start to the end of sexual contact. · Talk to your partner before you have sex. Find out if he or she has or is at risk for any sexually transmitted infection (STI). Keep in mind that a person may be able to spread an STI even if he or she does not have symptoms. · Do not have sex with anyone who has symptoms of an STI. These include sores on the genitals or mouth. · Having one sex partner (who does not have STIs and does not have sex with anyone else) is a good way to avoid STIs. When should you call for help? Call your doctor now or seek immediate medical care if:    · You have new or worse pain in your belly or pelvis.     · You have vaginal discharge that has increased in amount or smells bad.     · You have unusual vaginal bleeding.     · You have a new or higher fever.    Watch closely for changes in your health, and be sure to contact your doctor if:    · You do not get better as expected. Where can you learn more? Go to http://krystal-fausto.info/. Enter M340 in the search box to learn more about \"Cervicitis: Care Instructions. \"  Current as of: December 19, 2018  Content Version: 12.2  © 0303-7809 [employer]. Care instructions adapted under license by [employer] (which disclaims liability or warranty for this information). If you have questions about a medical condition or this instruction, always ask your healthcare professional. [place] [address] any warranty or liability for your use of this information. Patient Education        Urinary Tract Infection in Women: Care Instructions  Your Care Instructions    A urinary tract infection, or UTI, is a general term for an infection anywhere between the kidneys and the urethra (where urine comes out). Most UTIs are bladder infections. They often cause pain or burning when you urinate. UTIs are caused by bacteria and can be cured with antibiotics.  Be sure to complete your treatment so that the infection goes away. Follow-up care is a key part of your treatment and safety. Be sure to make and go to all appointments, and call your doctor if you are having problems. It's also a good idea to know your test results and keep a list of the medicines you take. How can you care for yourself at home? · Take your antibiotics as directed. Do not stop taking them just because you feel better. You need to take the full course of antibiotics. · Drink extra water and other fluids for the next day or two. This may help wash out the bacteria that are causing the infection. (If you have kidney, heart, or liver disease and have to limit fluids, talk with your doctor before you increase your fluid intake.)  · Avoid drinks that are carbonated or have caffeine. They can irritate the bladder. · Urinate often. Try to empty your bladder each time. · To relieve pain, take a hot bath or lay a heating pad set on low over your lower belly or genital area. Never go to sleep with a heating pad in place. To prevent UTIs  · Drink plenty of water each day. This helps you urinate often, which clears bacteria from your system. (If you have kidney, heart, or liver disease and have to limit fluids, talk with your doctor before you increase your fluid intake.)  · Urinate when you need to. · Urinate right after you have sex. · Change sanitary pads often. · Avoid douches, bubble baths, feminine hygiene sprays, and other feminine hygiene products that have deodorants. · After going to the bathroom, wipe from front to back. When should you call for help? Call your doctor now or seek immediate medical care if:    · Symptoms such as fever, chills, nausea, or vomiting get worse or appear for the first time.     · You have new pain in your back just below your rib cage. This is called flank pain.     · There is new blood or pus in your urine.     · You have any problems with your antibiotic medicine.  Watch closely for changes in your health, and be sure to contact your doctor if:    · You are not getting better after taking an antibiotic for 2 days.     · Your symptoms go away but then come back. Where can you learn more? Go to http://krystal-fausto.info/. Enter H574 in the search box to learn more about \"Urinary Tract Infection in Women: Care Instructions. \"  Current as of: December 19, 2018  Content Version: 12.2  © 3761-0597 [employer]. Care instructions adapted under license by [employer] (which disclaims liability or warranty for this information). If you have questions about a medical condition or this instruction, always ask your healthcare professional. [address] any warranty or liability for your use of this information.

## 2019-12-17 LAB
HSV1 IGG SER IA-ACNC: 6.79 INDEX (ref 0–0.9)
HSV1+2 IGM SER IA-ACNC: <0.91 RATIO (ref 0–0.9)
HSV2 IGG SER IA-ACNC: 10.5 INDEX (ref 0–0.9)

## 2019-12-18 LAB
BACTERIA SPEC CULT: NORMAL
C TRACH RRNA SPEC QL NAA+PROBE: NEGATIVE
N GONORRHOEA RRNA SPEC QL NAA+PROBE: NEGATIVE
SERVICE CMNT-IMP: NORMAL
SPECIMEN SOURCE: NORMAL

## 2020-01-19 ENCOUNTER — APPOINTMENT (OUTPATIENT)
Dept: GENERAL RADIOLOGY | Age: 40
End: 2020-01-19
Attending: EMERGENCY MEDICINE
Payer: MEDICARE

## 2020-01-19 ENCOUNTER — HOSPITAL ENCOUNTER (EMERGENCY)
Age: 40
Discharge: LEFT BEFORE DISCHARGE | End: 2020-01-19
Attending: EMERGENCY MEDICINE
Payer: MEDICARE

## 2020-01-19 VITALS
WEIGHT: 165 LBS | OXYGEN SATURATION: 97 % | SYSTOLIC BLOOD PRESSURE: 163 MMHG | HEART RATE: 90 BPM | RESPIRATION RATE: 18 BRPM | HEIGHT: 63 IN | BODY MASS INDEX: 29.23 KG/M2 | DIASTOLIC BLOOD PRESSURE: 73 MMHG

## 2020-01-19 PROCEDURE — 75810000275 HC EMERGENCY DEPT VISIT NO LEVEL OF CARE
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
## 2021-01-06 ENCOUNTER — HOSPITAL ENCOUNTER (EMERGENCY)
Age: 41
Discharge: HOME | End: 2021-01-06
Attending: STUDENT IN AN ORGANIZED HEALTH CARE EDUCATION/TRAINING PROGRAM
Payer: MEDICARE

## 2021-01-06 VITALS
HEART RATE: 80 BPM | BODY MASS INDEX: 28.53 KG/M2 | SYSTOLIC BLOOD PRESSURE: 144 MMHG | OXYGEN SATURATION: 98 % | WEIGHT: 161 LBS | HEIGHT: 63 IN | DIASTOLIC BLOOD PRESSURE: 83 MMHG | RESPIRATION RATE: 16 BRPM | TEMPERATURE: 99.8 F

## 2021-01-06 DIAGNOSIS — J06.9 VIRAL UPPER RESPIRATORY TRACT INFECTION: Primary | ICD-10-CM

## 2021-01-06 LAB
FLUAV AG NPH QL IA: NEGATIVE
FLUBV AG NPH QL IA: NEGATIVE
SPECIMEN SOURCE: NORMAL

## 2021-01-06 PROCEDURE — 87635 SARS-COV-2 COVID-19 AMP PRB: CPT

## 2021-01-06 PROCEDURE — 96372 THER/PROPH/DIAG INJ SC/IM: CPT

## 2021-01-06 PROCEDURE — 74011250637 HC RX REV CODE- 250/637: Performed by: STUDENT IN AN ORGANIZED HEALTH CARE EDUCATION/TRAINING PROGRAM

## 2021-01-06 PROCEDURE — 99283 EMERGENCY DEPT VISIT LOW MDM: CPT

## 2021-01-06 PROCEDURE — 87804 INFLUENZA ASSAY W/OPTIC: CPT

## 2021-01-06 RX ORDER — ACETAMINOPHEN 500 MG
1000 TABLET ORAL
Status: COMPLETED | OUTPATIENT
Start: 2021-01-06 | End: 2021-01-06

## 2021-01-06 RX ADMIN — ACETAMINOPHEN 1000 MG: 500 TABLET ORAL at 20:37

## 2021-01-07 LAB
SARS COV-2, XPGCVT: POSITIVE
SOURCE, COVRS: ABNORMAL

## 2021-01-07 NOTE — ED NOTES
I have reviewed discharge instructions with the patient. The patient verbalized understanding. Patient left ED via Discharge Method: ambulatory to Home with herself. Opportunity for questions and clarification provided. Patient given 0 scripts. To continue your aftercare when you leave the hospital, you may receive an automated call from our care team to check in on how you are doing. This is a free service and part of our promise to provide the best care and service to meet your aftercare needs.  If you have questions, or wish to unsubscribe from this service please call [phone]. Thank you for Choosing our [facility] Emergency Department.

## 2021-01-07 NOTE — DISCHARGE INSTRUCTIONS
Quarantine until your COVID-19 test results are finalized. Alternate Tylenol and Motrin as needed for fever and body aches. Continue to stay hydrated with clear liquids. Follow-up with family medicine as needed. Return to the ER for worsening or worrisome symptoms.

## 2021-01-07 NOTE — ED NOTES
Patient presents with c/o congestion, sore throat, body aches, and fever x3 days.  Patient masked on arrival.

## 2021-01-07 NOTE — ED PROVIDER NOTES
Patient is a 61-year-old female presents to [address] emergency department complaining of cough, congestion, headache and body aches. Patient also had low-grade fever at home. Denies taking any over-the-counter medication for this. Denies any known exposure to COVID-19. Patient denies any outpatient testing. She denies significant shortness of breath. She denies chest pain, nausea, vomiting, diarrhea, abdominal pain, dysuria, vaginal discharge.            Past Medical History:   Diagnosis Date    Anxiety     Anxiety, generalized     Asthma     Chronic back pain     Depression     Other unknown and unspecified cause of morbidity or mortality     tremors   headache    Postpartum depression     Sickle cell disease ([facility] [address]     sickle cell trait       Past Surgical History:   Procedure Laterality Date    HX CHOLECYSTECTOMY      HX GYN      c- section    HX OTHER SURGICAL      laparoscopy for endometriosis; arthritis in neck and back    NE  DELIVERY ONLY      first pregnancy         Family History:   Problem Relation Age of Onset    Arthritis-osteo Mother     Asthma Mother     Diabetes Mother     Hypertension Mother     Psychiatric Disorder Mother     Hypertension Father     Asthma Brother     Lung Disease Maternal Grandmother     Cancer Maternal Grandfather     Lung Disease Maternal Grandfather     Lung Disease Paternal Grandmother     Cancer Paternal Grandfather     Lung Disease Paternal Grandfather        Social History     Socioeconomic History    Marital status: SINGLE     Spouse name: Not on file    Number of children: Not on file    Years of education: Not on file    Highest education level: Not on file   Occupational History    Not on file   Social Needs    Financial resource strain: Not on file    Food insecurity     Worry: Not on file     Inability: Not on file    Transportation needs     Medical: Not on file     Non-medical: Not on file   Tobacco Use    Smoking status: Never Smoker    Smokeless tobacco: Never Used   Substance and Sexual Activity    Alcohol use: No    Drug use: No    Sexual activity: Never     Partners: Male     Birth control/protection: None, Injection   Lifestyle    Physical activity     Days per week: Not on file     Minutes per session: Not on file    Stress: Not on file   Relationships    Social connections     Talks on phone: Not on file     Gets together: Not on file     Attends Orthodoxy service: Not on file     Active member of club or organization: Not on file     Attends meetings of clubs or organizations: Not on file     Relationship status: Not on file    Intimate partner violence     Fear of current or ex partner: Not on file     Emotionally abused: Not on file     Physically abused: Not on file     Forced sexual activity: Not on file   Other Topics Concern    Not on file   Social History Narrative    Not on file         ALLERGIES: Amoxicillin, Imitrex [sumatriptan succinate], Prilosec [omeprazole], and Sulfa (sulfonamide antibiotics)    Review of Systems   Constitutional: Positive for fatigue and fever. Negative for chills. HENT: Negative for facial swelling and sore throat. Eyes: Negative for visual disturbance. Respiratory: Positive for cough. Negative for chest tightness and shortness of breath. Cardiovascular: Negative for chest pain and palpitations. Gastrointestinal: Negative for abdominal pain, diarrhea, nausea and vomiting. Genitourinary: Negative for difficulty urinating, dysuria and flank pain. Musculoskeletal: Negative for myalgias, neck pain and neck stiffness. Skin: Negative for color change. Neurological: Negative for dizziness, speech difficulty, weakness, numbness and headaches. Psychiatric/Behavioral: Negative for confusion.        Vitals:    01/06/21 1925 01/06/21 2032 01/06/21 2033 01/06/21 2035   BP: (!) 157/104  139/74    Pulse: 85   82   Resp: 16      Temp: (!) 100.7 °F (38.2 °C)      SpO2: 100% 98%  96%   Weight: 73 kg (161 lb)      Height: 5' 3\" (1.6 m)               Physical Exam  Vitals signs and nursing note reviewed. Constitutional:       Appearance: Normal appearance. She is not ill-appearing or toxic-appearing. HENT:      Head: Normocephalic and atraumatic. Nose: Nose normal.      Mouth/Throat:      Mouth: Mucous membranes are moist.   Eyes:      Extraocular Movements: Extraocular movements intact. Neck:      Musculoskeletal: Normal range of motion. No neck rigidity. Cardiovascular:      Rate and Rhythm: Normal rate and regular rhythm. Pulses: Normal pulses. Heart sounds: Normal heart sounds. Pulmonary:      Effort: Pulmonary effort is normal. No respiratory distress. Breath sounds: Normal breath sounds. Abdominal:      General: Abdomen is flat. There is no distension. Palpations: Abdomen is soft. Tenderness: There is no abdominal tenderness. Musculoskeletal: Normal range of motion. Skin:     General: Skin is warm and dry. Neurological:      General: No focal deficit present. Mental Status: She is alert and oriented to person, place, and time. Psychiatric:         Mood and Affect: Mood normal.          MDM  Number of Diagnoses or Management Options  Viral upper respiratory tract infection  Diagnosis management comments: Patient seen wearing appropriate PPE. Complains of upper respiratory infection symptoms. Patient with completely clear lung exam, do not feel the patient benefit from chest x-ray. Influenza as well as COVID-19 testing will be obtained. She was advised to quarantine until these results are finalized. Patient was given Tylenol for her fever here. She denies any her symptoms. She has remained stable be discharged home, will follow up with PCP as needed. Return to the ER for worsening or worrisome symptoms. She voiced understanding agreement this plan. Voice dictation software was used during the making of this note. This software is not perfect and grammatical and other typographical errors may be present. This note has been proofread, but may still contain errors.   Raza Voss, DO; 1/6/2021 @8:55 PM   ===================================================================           Procedures

## 2021-02-23 ENCOUNTER — TRANSCRIBE ORDER (OUTPATIENT)
Dept: SCHEDULING | Age: 41
End: 2021-02-23

## 2021-02-23 DIAGNOSIS — Z12.31 SCREENING MAMMOGRAM FOR HIGH-RISK PATIENT: Primary | ICD-10-CM

## 2021-05-27 ENCOUNTER — TRANSCRIBE ORDER (OUTPATIENT)
Dept: REGISTRATION | Age: 41
End: 2021-05-27

## 2021-05-27 DIAGNOSIS — Z12.31 VISIT FOR SCREENING MAMMOGRAM: Primary | ICD-10-CM

## 2021-06-02 ENCOUNTER — HOSPITAL ENCOUNTER (OUTPATIENT)
Dept: MAMMOGRAPHY | Age: 41
Discharge: HOME | End: 2021-06-02
Attending: INTERNAL MEDICINE
Payer: MEDICARE

## 2021-06-02 DIAGNOSIS — Z12.31 VISIT FOR SCREENING MAMMOGRAM: ICD-10-CM

## 2021-06-02 PROCEDURE — 77067 SCR MAMMO BI INCL CAD: CPT

## 2022-01-13 ENCOUNTER — HOSPITAL ENCOUNTER (EMERGENCY)
Age: 42
Discharge: HOME | End: 2022-01-13
Attending: EMERGENCY MEDICINE
Payer: MEDICARE

## 2022-01-13 VITALS
RESPIRATION RATE: 20 BRPM | DIASTOLIC BLOOD PRESSURE: 81 MMHG | TEMPERATURE: 98.3 F | OXYGEN SATURATION: 98 % | BODY MASS INDEX: 27.82 KG/M2 | SYSTOLIC BLOOD PRESSURE: 123 MMHG | HEIGHT: 65 IN | WEIGHT: 167 LBS | HEART RATE: 60 BPM

## 2022-01-13 DIAGNOSIS — Z20.822 SUSPECTED COVID-19 VIRUS INFECTION: Primary | ICD-10-CM

## 2022-01-13 LAB
FLUAV AG NPH QL IA: NEGATIVE
FLUBV AG NPH QL IA: NEGATIVE
SARS-COV-2, COV2: DETECTED
SARS-COV-2, COV2: NORMAL
SPECIMEN SOURCE, FCOV2M: ABNORMAL
SPECIMEN SOURCE: NORMAL

## 2022-01-13 PROCEDURE — 74011250637 HC RX REV CODE- 250/637: Performed by: PHYSICIAN ASSISTANT

## 2022-01-13 PROCEDURE — 87804 INFLUENZA ASSAY W/OPTIC: CPT

## 2022-01-13 PROCEDURE — 99283 EMERGENCY DEPT VISIT LOW MDM: CPT

## 2022-01-13 PROCEDURE — U0005 INFEC AGEN DETEC AMPLI PROBE: HCPCS

## 2022-01-13 RX ORDER — ZOLPIDEM TARTRATE 12.5 MG/1
TABLET, FILM COATED, EXTENDED RELEASE ORAL
COMMUNITY
Start: 2021-12-10

## 2022-01-13 RX ORDER — ALBUTEROL SULFATE 90 UG/1
AEROSOL, METERED RESPIRATORY (INHALATION)
COMMUNITY

## 2022-01-13 RX ORDER — IBUPROFEN 800 MG/1
TABLET ORAL
COMMUNITY
Start: 2021-12-31

## 2022-01-13 RX ORDER — LANOLIN ALCOHOL/MO/W.PET/CERES
CREAM (GRAM) TOPICAL
COMMUNITY

## 2022-01-13 RX ORDER — BUTALBITAL AND ACETAMINOPHEN 325; 50 MG/1; MG/1
TABLET ORAL
COMMUNITY
Start: 2021-11-29

## 2022-01-13 RX ORDER — LURASIDONE HYDROCHLORIDE 40 MG/1
40 TABLET, FILM COATED ORAL
COMMUNITY
Start: 2021-11-24

## 2022-01-13 RX ORDER — IBUPROFEN 800 MG/1
800 TABLET ORAL
Status: COMPLETED | OUTPATIENT
Start: 2022-01-13 | End: 2022-01-13

## 2022-01-13 RX ORDER — HYDROCODONE BITARTRATE AND ACETAMINOPHEN 7.5; 325 MG/1; MG/1
TABLET ORAL
COMMUNITY
Start: 2022-01-03

## 2022-01-13 RX ADMIN — IBUPROFEN 800 MG: 800 TABLET, FILM COATED ORAL at 09:57

## 2022-01-13 NOTE — ED NOTES
I have reviewed discharge instructions with the patient. The patient verbalized understanding. Patient left ED via Discharge Method: ambulatory to Home. Opportunity for questions and clarification provided. Patient given 0 scripts. To continue your aftercare when you leave the hospital, you may receive an automated call from our care team to check in on how you are doing. This is a free service and part of our promise to provide the best care and service to meet your aftercare needs.  If you have questions, or wish to unsubscribe from this service please call [phone]. Thank you for Choosing our [facility] Emergency Department.

## 2022-01-13 NOTE — ACP (ADVANCE CARE PLANNING)
Advance Care Planning     Advance Care Planning Activator (Inpatient)  Conversation Note      Date of ACP Conversation: 01/13/22     Conversation Conducted with:   Patient with Decision Making Capacity. Pt declined having the HCPOA discussion at this time.       ACP Activator: Patel Milner RN

## 2022-01-13 NOTE — Clinical Note
[facility]  [address] [id number] [phone]    Work/School Note    Date: 1/13/2022     To Whom It May concern:    Leopold Pace was evaluated by the following provider(s):  Attending Provider: Dennis Zavala MD  Physician Assistant: Nichole Li, [address] virus is suspected. Per the CDC guidelines we recommend home isolation until the following conditions are all met:    1. At least five days have passed since symptoms first appeared and/or had a close exposure,   2. After home isolation for five days, wearing a mask around others for the next five days,  3. At least 24 have passed since last fever without the use of fever-reducing medications and  4.  Symptoms (eg cough, shortness of breath) have improved      Sincerely,          RUSH Page

## 2022-01-13 NOTE — ED PROVIDER NOTES
ARIS Yates is 46 y.o. female who presents to the emergency department for evaluation of flu like symptoms. She complains of three day history of cough, rhinorrhea, body aches. She is concerned she may have the flu or covid. She did receive the covid vaccine. She denies chest pain or shortness of breath. She denies alleviating or aggravating factors but has not attempted any OTC treatments.      Past Medical History:   Diagnosis Date    Anxiety     Anxiety, generalized     Asthma     Chronic back pain     Depression     Other unknown and unspecified cause of morbidity or mortality     tremors   headache    Postpartum depression     Sickle cell disease ([facility] [address]     sickle cell trait       Past Surgical History:   Procedure Laterality Date    HX CHOLECYSTECTOMY      HX GYN      c- section    HX OTHER SURGICAL      laparoscopy for endometriosis; arthritis in neck and back    PA  DELIVERY ONLY      first pregnancy         Family History:   Problem Relation Age of Onset    OSTEOARTHRITIS Mother     Asthma Mother     Diabetes Mother     Hypertension Mother     Psychiatric Disorder Mother     Hypertension Father     Asthma Brother     Lung Disease Maternal Grandmother     Cancer Maternal Grandfather     Lung Disease Maternal Grandfather     Lung Disease Paternal Grandmother     Cancer Paternal Grandfather     Lung Disease Paternal Grandfather        Social History     Socioeconomic History    Marital status: SINGLE     Spouse name: Not on file    Number of children: Not on file    Years of education: Not on file    Highest education level: Not on file   Occupational History    Not on file   Tobacco Use    Smoking status: Never Smoker    Smokeless tobacco: Never Used   Substance and Sexual Activity    Alcohol use: No    Drug use: No    Sexual activity: Never     Partners: Male     Birth control/protection: None, Injection   Other Topics Concern    Not on file   Social History Narrative    Not on file     Social Determinants of Health     Financial Resource Strain:     Difficulty of Paying Living Expenses: Not on file   Food Insecurity:     Worried About Running Out of Food in the Last Year: Not on file    Grant of Food in the Last Year: Not on file   Transportation Needs:     Lack of Transportation (Medical): Not on file    Lack of Transportation (Non-Medical): Not on file   Physical Activity:     Days of Exercise per Week: Not on file    Minutes of Exercise per Session: Not on file   Stress:     Feeling of Stress : Not on file   Social Connections:     Frequency of Communication with Friends and Family: Not on file    Frequency of Social Gatherings with Friends and Family: Not on file    Attends Anabaptist Services: Not on file    Active Member of [address] [place] [address] [employer] or Organizations: Not on file    Attends Club or Organization Meetings: Not on file    Marital Status: Not on file   Intimate Partner Violence:     Fear of Current or Ex-Partner: Not on file    Emotionally Abused: Not on file    Physically Abused: Not on file    Sexually Abused: Not on file   Housing Stability:     Unable to Pay for Housing in the Last Year: Not on file    Number of [place] in the Last Year: Not on file    Unstable Housing in the Last Year: Not on file         ALLERGIES: Amoxicillin, Imitrex [sumatriptan succinate], Prilosec [omeprazole], and Sulfa (sulfonamide antibiotics)    Review of Systems   Constitutional: Positive for fatigue and fever. HENT: Positive for congestion and sinus pressure. Musculoskeletal: Positive for myalgias. All other systems reviewed and are negative. Vitals:    01/13/22 0919   BP: 123/81   Pulse: 60   Resp: 20   Temp: 98.3 °F (36.8 °C)   SpO2: 98%   Weight: 75.8 kg (167 lb)   Height: 5' 4.5\" (1.638 m)            Physical Exam  Vitals and nursing note reviewed. Constitutional:       Appearance: Normal appearance.    HENT:      Mouth/Throat: Mouth: Mucous membranes are moist.      Pharynx: Oropharynx is clear. No oropharyngeal exudate or posterior oropharyngeal erythema. Eyes:      Pupils: Pupils are equal, round, and reactive to light. Cardiovascular:      Rate and Rhythm: Normal rate and regular rhythm. Pulmonary:      Effort: Pulmonary effort is normal.      Breath sounds: Normal breath sounds. Musculoskeletal:         General: Normal range of motion. Skin:     General: Skin is warm and dry. Neurological:      General: No focal deficit present. Mental Status: She is alert and oriented to person, place, and time. Psychiatric:         Mood and Affect: Mood normal.          MDM  Number of Diagnoses or Management Options  Suspected COVID-19 virus infection: new and requires workup     Amount and/or Complexity of Data Reviewed  Clinical lab tests: ordered and reviewed    Patient Progress  Patient progress: stable    ED Course as of 01/13/22 1039   Thu Jan 13, 2022   1037 Influenza B Ag: Negative [AE]   1037 Influenza A Ag: Negative [AE]   1037 VSS. Isolation, symptomatic treatment, return precautions discussed. She is discharged to home. Results, plan of care and return precautions discussed with the patient. They verbalize understanding and ability to comply.     [AE]      ED Course User Index  [AE] RUSH Talavera       Procedures

## 2022-02-18 PROBLEM — G89.29 CHRONIC LEFT SHOULDER PAIN: Status: ACTIVE | Noted: 2022-02-18

## 2022-02-18 PROBLEM — M25.512 CHRONIC LEFT SHOULDER PAIN: Status: ACTIVE | Noted: 2022-02-18

## 2022-03-10 VITALS — HEIGHT: 63 IN | WEIGHT: 165 LBS | BODY MASS INDEX: 29.23 KG/M2

## 2022-03-10 NOTE — PERIOP NOTES
Patient verified name and . Order for consent not found in EHR ; patient verifies procedure. Type 1b surgery, Phone assessment complete. Orders not received. Labs per surgeon: none  Labs per anesthesia protocol: none    Patient answered medical/surgical history questions at their best of ability. All prior to admission medications documented in [facility]. Patient instructed to take the following medications the day of surgery according to anesthesia guidelines with a small sip of water: Dexilant, Prozac, Norco prn, Latuda, Lovastatin, Propranolol, Phrenilin prn. Hold all vitamins 7 days prior to surgery and NSAIDS 5 days prior to surgery. Prescription meds to hold:none      Patient instructed on the following:    > Arrive at [facility], time of arrival to be called the day before by 1700  > NPO after midnight including gum, mints, and ice chips  > Responsible adult must drive patient to the hospital, stay during surgery, and patient will need supervision 24 hours after anesthesia  > Use antibacterial soap/hibiclens in shower the night before surgery and on the morning of surgery  > All piercings must be removed prior to arrival.    > Leave all valuables (money and jewelry) at home but bring insurance card and ID on DOS.   > Do not wear make-up, nail polish, lotions, cologne, perfumes, powders, or oil on skin. Artificial nails are not permitted.

## 2022-03-14 ENCOUNTER — HOSPITAL ENCOUNTER (OUTPATIENT)
Dept: SURGERY | Age: 42
Discharge: HOME | End: 2022-03-14

## 2022-03-15 PROBLEM — S46.012A TRAUMATIC INCOMPLETE TEAR OF LEFT ROTATOR CUFF: Status: ACTIVE | Noted: 2022-03-15

## 2022-03-15 PROBLEM — S46.112A STRAIN OF LONG HEAD OF LEFT BICEPS: Status: ACTIVE | Noted: 2022-03-15

## 2022-03-15 PROBLEM — M19.012 DEGENERATIVE JOINT DISEASE OF LEFT ACROMIOCLAVICULAR JOINT: Status: ACTIVE | Noted: 2022-03-15

## 2022-03-15 NOTE — H&P
Subjective:     Patient is a 46 y.o. RHD FEMALE WITH LEFT SHOULDER PAIN. SEE OFFICE NOTE. Patient Active Problem List    Diagnosis Date Noted    Traumatic incomplete tear of left rotator cuff 03/15/2022    Strain of long head of left biceps 03/15/2022    Degenerative joint disease of left acromioclavicular joint 03/15/2022    Chronic left shoulder pain 2022    Chronic back pain     Normal labor 2016     labor 2015     Past Medical History:   Diagnosis Date    Anxiety     Anxiety, generalized     Asthma     Albuterol inhaler prn    Chronic back pain     Depression     Migraine     Occasional tremors     Postpartum depression     Sickle cell disease (HCC)     sickle cell trait      Past Surgical History:   Procedure Laterality Date    HX ACL RECONSTRUCTION Right     HX CHOLECYSTECTOMY      HX PELVIC LAPAROSCOPY      laparoscopy for endometriosis    [place]  DELIVERY ONLY      first pregnancy    [place] SINUS SURGERY PROC UNLISTED        Prior to Admission medications    Medication Sig Start Date End Date Taking? Authorizing Provider   albuterol (PROVENTIL HFA, VENTOLIN HFA, PROAIR HFA) 90 mcg/actuation inhaler 2 puffs as needed    Other, MD Светлана   butalbitaL-acetaminophen (PHRENILIN)  mg tablet 1 tablet as needed 21   Devorah, MD Светлана   HYDROcodone-acetaminophen (NORCO) 7.5-325 mg per tablet  1/3/22   OtherСветлана MD   ibuprofen (MOTRIN) 800 mg tablet every eight (8) hours as needed. 21   Светлана Fairchild MD   Latuda 40 mg tab tablet Take 40 mg by mouth daily (with breakfast). 21   Dveorah, MD Светлана   magnesium oxide (MAG-OX) 400 mg tablet 1 tablet with food    Светлана Fairchild MD   zolpidem CR (AMBIEN CR) 12.5 mg tablet  12/10/21   Светлана Fairchild MD   propranolol (INDERAL) 20 mg tablet Take 20 mg by mouth three (3) times daily.  Indications: essential tremor, migraine prevention    Other, MD Светлана   lovastatin (MEVACOR) 20 mg tablet Take 20 mg by mouth daily. Other, MD Светлана   Dexlansoprazole (DEXILANT) 60 mg CpDB Take  by mouth daily. Светлана Fairchild MD   FLUoxetine (PROZAC) 40 mg capsule Take 60 mg by mouth daily. Provider, Historical     Allergies   Allergen Reactions    Latex Rash    Amoxicillin Hives    Imitrex [Sumatriptan Succinate] Shortness of Breath    Prilosec [Omeprazole] Itching     Tongue and throat    Sulfa (Sulfonamide Antibiotics) Shortness of Breath      Social History     Tobacco Use    Smoking status: Never Smoker    Smokeless tobacco: Never Used   Substance Use Topics    Alcohol use: No      Family History   Problem Relation Age of Onset    OSTEOARTHRITIS Mother     Asthma Mother     Diabetes Mother     Hypertension Mother     Psychiatric Disorder Mother     Hypertension Father     Asthma Brother     Lung Disease Maternal Grandmother     Cancer Maternal Grandfather     Lung Disease Maternal Grandfather     Lung Disease Paternal Grandmother     Cancer Paternal Grandfather     Lung Disease Paternal Grandfather       Review of Systems  A comprehensive review of systems was negative except for that written in the HPI. Objective:     No data found. There were no vitals taken for this visit. General:  Alert, cooperative, no distress, appears stated age. Head:  Normocephalic, without obvious abnormality, atraumatic. Eyes:  Conjunctivae/corneas clear. PERRL, EOMs intact. Fundi benign. Back:   Symmetric, no curvature. ROM normal. No CVA tenderness. Lungs:   Clear to auscultation bilaterally. Chest wall:  No tenderness or deformity. Heart:  Regular rate and rhythm, S1, S2 normal, no murmur, click, rub or gallop. Extremities: Extremities normal, atraumatic, no cyanosis or edema. Pulses: 2+ and symmetric all extremities. Skin: Skin color, texture, turgor normal. No rashes or lesions.    Lymph nodes: Cervical, supraclavicular, and axillary nodes normal.   Neurologic: CNII-XII intact. Normal strength, sensation and reflexes throughout. Assessment:     Principal Problem:    Traumatic incomplete tear of left rotator cuff (3/15/2022)    Active Problems:    Strain of long head of left biceps (3/15/2022)      Degenerative joint disease of left acromioclavicular joint (3/15/2022)        Plan:     The various methods of treatment have been discussed with the patient and family. PATIENT HAS EXHAUSTED NON-OPERATIVE MODALITIES     After consideration of risks, benefits and other options for treatment, the patient has consented to surgical intervention.     SEE OFFICE NOTE    Saturnino Stark MD
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
## 2022-03-16 RX ORDER — SODIUM CHLORIDE 0.9 % (FLUSH) 0.9 %
5-40 SYRINGE (ML) INJECTION EVERY 8 HOURS
Status: CANCELLED | OUTPATIENT
Start: 2022-03-16

## 2022-03-16 RX ORDER — SODIUM CHLORIDE 0.9 % (FLUSH) 0.9 %
5-40 SYRINGE (ML) INJECTION AS NEEDED
Status: CANCELLED | OUTPATIENT
Start: 2022-03-16

## 2022-03-17 ENCOUNTER — ANESTHESIA EVENT (OUTPATIENT)
Dept: SURGERY | Age: 42
End: 2022-03-17
Payer: MEDICARE

## 2022-03-18 ENCOUNTER — ANESTHESIA (OUTPATIENT)
Dept: SURGERY | Age: 42
End: 2022-03-18
Payer: MEDICARE

## 2022-03-18 ENCOUNTER — HOSPITAL ENCOUNTER (OUTPATIENT)
Age: 42
Setting detail: OUTPATIENT SURGERY
Discharge: HOME | End: 2022-03-18
Attending: ORTHOPAEDIC SURGERY | Admitting: ORTHOPAEDIC SURGERY
Payer: MEDICARE

## 2022-03-18 ENCOUNTER — APPOINTMENT (OUTPATIENT)
Dept: GENERAL RADIOLOGY | Age: 42
End: 2022-03-18
Attending: ORTHOPAEDIC SURGERY
Payer: MEDICARE

## 2022-03-18 VITALS
BODY MASS INDEX: 28.56 KG/M2 | TEMPERATURE: 98.1 F | OXYGEN SATURATION: 97 % | WEIGHT: 161.2 LBS | DIASTOLIC BLOOD PRESSURE: 87 MMHG | RESPIRATION RATE: 16 BRPM | HEART RATE: 50 BPM | SYSTOLIC BLOOD PRESSURE: 134 MMHG

## 2022-03-18 DIAGNOSIS — S46.012D TRAUMATIC INCOMPLETE TEAR OF LEFT ROTATOR CUFF, SUBSEQUENT ENCOUNTER: ICD-10-CM

## 2022-03-18 DIAGNOSIS — S46.112A STRAIN OF LONG HEAD OF LEFT BICEPS: ICD-10-CM

## 2022-03-18 DIAGNOSIS — M19.012 DEGENERATIVE JOINT DISEASE OF LEFT ACROMIOCLAVICULAR JOINT: Primary | ICD-10-CM

## 2022-03-18 PROBLEM — M25.512 CHRONIC LEFT SHOULDER PAIN: Status: ACTIVE | Noted: 2022-02-18

## 2022-03-18 PROBLEM — G89.29 CHRONIC LEFT SHOULDER PAIN: Status: ACTIVE | Noted: 2022-02-18

## 2022-03-18 LAB
EST. AVERAGE GLUCOSE BLD GHB EST-MCNC: 100 MG/DL
GLUCOSE BLD STRIP.AUTO-MCNC: 90 MG/DL (ref 65–100)
HBA1C MFR BLD: 5.1 % (ref 4.2–6.3)
HCG UR QL: NEGATIVE
HGB BLD-MCNC: 12.8 G/DL (ref 11.7–15.4)
SERVICE CMNT-IMP: NORMAL

## 2022-03-18 PROCEDURE — 29824 SHO ARTHRS SRG DSTL CLAVICLC: CPT | Performed by: ORTHOPAEDIC SURGERY

## 2022-03-18 PROCEDURE — C1713 ANCHOR/SCREW BN/BN,TIS/BN: HCPCS | Performed by: ORTHOPAEDIC SURGERY

## 2022-03-18 PROCEDURE — 23430 REPAIR BICEPS TENDON: CPT | Performed by: ORTHOPAEDIC SURGERY

## 2022-03-18 PROCEDURE — 77030040922 HC BLNKT HYPOTHRM STRY -A: Performed by: ANESTHESIOLOGY

## 2022-03-18 PROCEDURE — 77030003666 HC NDL SPINAL BD -A: Performed by: ORTHOPAEDIC SURGERY

## 2022-03-18 PROCEDURE — 74011000250 HC RX REV CODE- 250: Performed by: NURSE ANESTHETIST, CERTIFIED REGISTERED

## 2022-03-18 PROCEDURE — 77030002986 HC SUT PROL J&J -A: Performed by: ORTHOPAEDIC SURGERY

## 2022-03-18 PROCEDURE — 76010000149 HC OR TIME 1 TO 1.5 HR: Performed by: ORTHOPAEDIC SURGERY

## 2022-03-18 PROCEDURE — 77030033005 HC TBNG ARTHSC PMP STRY -B: Performed by: ORTHOPAEDIC SURGERY

## 2022-03-18 PROCEDURE — A4565 SLINGS: HCPCS | Performed by: ORTHOPAEDIC SURGERY

## 2022-03-18 PROCEDURE — 76060000033 HC ANESTHESIA 1 TO 1.5 HR: Performed by: ORTHOPAEDIC SURGERY

## 2022-03-18 PROCEDURE — 76210000020 HC REC RM PH II FIRST 0.5 HR: Performed by: ORTHOPAEDIC SURGERY

## 2022-03-18 PROCEDURE — 77030006668 HC BLD SHV MENSCS STRY -B: Performed by: ORTHOPAEDIC SURGERY

## 2022-03-18 PROCEDURE — 74011000250 HC RX REV CODE- 250: Performed by: ANESTHESIOLOGY

## 2022-03-18 PROCEDURE — 74011250636 HC RX REV CODE- 250/636: Performed by: NURSE ANESTHETIST, CERTIFIED REGISTERED

## 2022-03-18 PROCEDURE — 77030031139 HC SUT VCRL2 J&J -A: Performed by: ORTHOPAEDIC SURGERY

## 2022-03-18 PROCEDURE — 74011250636 HC RX REV CODE- 250/636: Performed by: ANESTHESIOLOGY

## 2022-03-18 PROCEDURE — 76210000006 HC OR PH I REC 0.5 TO 1 HR: Performed by: ORTHOPAEDIC SURGERY

## 2022-03-18 PROCEDURE — 23412 REPAIR ROTATOR CUFF CHRONIC: CPT | Performed by: ORTHOPAEDIC SURGERY

## 2022-03-18 PROCEDURE — 77030003602 HC NDL NRV BLK BBMI -B: Performed by: ANESTHESIOLOGY

## 2022-03-18 PROCEDURE — 74011250636 HC RX REV CODE- 250/636

## 2022-03-18 PROCEDURE — 81025 URINE PREGNANCY TEST: CPT

## 2022-03-18 PROCEDURE — 2709999900 HC NON-CHARGEABLE SUPPLY: Performed by: ORTHOPAEDIC SURGERY

## 2022-03-18 PROCEDURE — 76010010054 HC POST OP PAIN BLOCK: Performed by: ORTHOPAEDIC SURGERY

## 2022-03-18 PROCEDURE — 77030002916 HC SUT ETHLN J&J -A: Performed by: ORTHOPAEDIC SURGERY

## 2022-03-18 PROCEDURE — 77030004453 HC BUR SHV STRY -B: Performed by: ORTHOPAEDIC SURGERY

## 2022-03-18 PROCEDURE — 74011000250 HC RX REV CODE- 250: Performed by: ORTHOPAEDIC SURGERY

## 2022-03-18 PROCEDURE — 77030027384 HC PRB ARTHSCP SERFAS STRY -C: Performed by: ORTHOPAEDIC SURGERY

## 2022-03-18 PROCEDURE — 85018 HEMOGLOBIN: CPT

## 2022-03-18 PROCEDURE — 77030037088 HC TUBE ENDOTRACH ORAL NSL COVD-A: Performed by: ANESTHESIOLOGY

## 2022-03-18 PROCEDURE — 76942 ECHO GUIDE FOR BIOPSY: CPT | Performed by: ORTHOPAEDIC SURGERY

## 2022-03-18 PROCEDURE — 73030 X-RAY EXAM OF SHOULDER: CPT

## 2022-03-18 PROCEDURE — 29823 SHO ARTHRS SRG XTNSV DBRDMT: CPT | Performed by: ORTHOPAEDIC SURGERY

## 2022-03-18 PROCEDURE — 83036 HEMOGLOBIN GLYCOSYLATED A1C: CPT

## 2022-03-18 PROCEDURE — 77030006891 HC BLD SHV RESECT STRY -B: Performed by: ORTHOPAEDIC SURGERY

## 2022-03-18 PROCEDURE — 82962 GLUCOSE BLOOD TEST: CPT

## 2022-03-18 PROCEDURE — 77030039425 HC BLD LARYNG TRULITE DISP TELE -A: Performed by: ANESTHESIOLOGY

## 2022-03-18 DEVICE — 5.5MM PEEK ZIP SUTURE ANCHOR WITH ¿ CIRCLE TAPER NEEDLES, #2 FORCE FIBER
Type: IMPLANTABLE DEVICE | Site: SHOULDER | Status: FUNCTIONAL
Brand: PEEK ZIP

## 2022-03-18 DEVICE — ICONIX 2 NEEDLES WITH INTELLIBRAID TECHNOLOGY, 2.3MM ANCHOR WITH 2 STRANDS #2 FORCE FIBER
Type: IMPLANTABLE DEVICE | Site: SHOULDER | Status: FUNCTIONAL
Brand: ICONIX

## 2022-03-18 RX ORDER — LIDOCAINE HYDROCHLORIDE 20 MG/ML
INJECTION, SOLUTION EPIDURAL; INFILTRATION; INTRACAUDAL; PERINEURAL AS NEEDED
Status: DISCONTINUED | OUTPATIENT
Start: 2022-03-18 | End: 2022-03-18 | Stop reason: HOSPADM

## 2022-03-18 RX ORDER — ONDANSETRON 2 MG/ML
INJECTION INTRAMUSCULAR; INTRAVENOUS AS NEEDED
Status: DISCONTINUED | OUTPATIENT
Start: 2022-03-18 | End: 2022-03-18 | Stop reason: HOSPADM

## 2022-03-18 RX ORDER — GABAPENTIN 100 MG/1
100 CAPSULE ORAL 3 TIMES DAILY
Qty: 90 CAPSULE | Refills: 0 | Status: SHIPPED | OUTPATIENT
Start: 2022-03-18 | End: 2022-04-17

## 2022-03-18 RX ORDER — SODIUM CHLORIDE 0.9 % (FLUSH) 0.9 %
5-40 SYRINGE (ML) INJECTION EVERY 8 HOURS
Status: DISCONTINUED | OUTPATIENT
Start: 2022-03-18 | End: 2022-03-18 | Stop reason: HOSPADM

## 2022-03-18 RX ORDER — OXYCODONE HYDROCHLORIDE 5 MG/1
5 TABLET ORAL
Status: DISCONTINUED | OUTPATIENT
Start: 2022-03-18 | End: 2022-03-18 | Stop reason: HOSPADM

## 2022-03-18 RX ORDER — HALOPERIDOL 5 MG/ML
1 INJECTION INTRAMUSCULAR ONCE
Status: COMPLETED | OUTPATIENT
Start: 2022-03-18 | End: 2022-03-18

## 2022-03-18 RX ORDER — SODIUM CHLORIDE, SODIUM LACTATE, POTASSIUM CHLORIDE, CALCIUM CHLORIDE 600; 310; 30; 20 MG/100ML; MG/100ML; MG/100ML; MG/100ML
100 INJECTION, SOLUTION INTRAVENOUS CONTINUOUS
Status: DISCONTINUED | OUTPATIENT
Start: 2022-03-18 | End: 2022-03-18 | Stop reason: HOSPADM

## 2022-03-18 RX ORDER — ROCURONIUM BROMIDE 10 MG/ML
INJECTION, SOLUTION INTRAVENOUS AS NEEDED
Status: DISCONTINUED | OUTPATIENT
Start: 2022-03-18 | End: 2022-03-18 | Stop reason: HOSPADM

## 2022-03-18 RX ORDER — MIDAZOLAM HYDROCHLORIDE 1 MG/ML
2 INJECTION, SOLUTION INTRAMUSCULAR; INTRAVENOUS
Status: DISCONTINUED | OUTPATIENT
Start: 2022-03-18 | End: 2022-03-18 | Stop reason: HOSPADM

## 2022-03-18 RX ORDER — LIDOCAINE HYDROCHLORIDE AND EPINEPHRINE 10; 10 MG/ML; UG/ML
INJECTION, SOLUTION INFILTRATION; PERINEURAL AS NEEDED
Status: DISCONTINUED | OUTPATIENT
Start: 2022-03-18 | End: 2022-03-18 | Stop reason: HOSPADM

## 2022-03-18 RX ORDER — HYDROMORPHONE HYDROCHLORIDE 2 MG/1
2 TABLET ORAL
Qty: 40 TABLET | Refills: 0 | Status: SHIPPED | OUTPATIENT
Start: 2022-03-18 | End: 2022-03-25

## 2022-03-18 RX ORDER — PROMETHAZINE HYDROCHLORIDE 25 MG/1
25 TABLET ORAL
Qty: 20 TABLET | Refills: 0 | Status: SHIPPED | OUTPATIENT
Start: 2022-03-18 | End: 2022-03-23

## 2022-03-18 RX ORDER — LIDOCAINE HYDROCHLORIDE 10 MG/ML
0.1 INJECTION INFILTRATION; PERINEURAL AS NEEDED
Status: DISCONTINUED | OUTPATIENT
Start: 2022-03-18 | End: 2022-03-18 | Stop reason: HOSPADM

## 2022-03-18 RX ORDER — PROPOFOL 10 MG/ML
INJECTION, EMULSION INTRAVENOUS AS NEEDED
Status: DISCONTINUED | OUTPATIENT
Start: 2022-03-18 | End: 2022-03-18 | Stop reason: HOSPADM

## 2022-03-18 RX ORDER — HYDROMORPHONE HYDROCHLORIDE 2 MG/ML
0.5 INJECTION, SOLUTION INTRAMUSCULAR; INTRAVENOUS; SUBCUTANEOUS
Status: DISCONTINUED | OUTPATIENT
Start: 2022-03-18 | End: 2022-03-18 | Stop reason: HOSPADM

## 2022-03-18 RX ORDER — NALOXONE HYDROCHLORIDE 0.4 MG/ML
0.04 INJECTION, SOLUTION INTRAMUSCULAR; INTRAVENOUS; SUBCUTANEOUS
Status: DISCONTINUED | OUTPATIENT
Start: 2022-03-18 | End: 2022-03-18 | Stop reason: HOSPADM

## 2022-03-18 RX ORDER — SODIUM CHLORIDE 0.9 % (FLUSH) 0.9 %
5-40 SYRINGE (ML) INJECTION AS NEEDED
Status: DISCONTINUED | OUTPATIENT
Start: 2022-03-18 | End: 2022-03-18 | Stop reason: HOSPADM

## 2022-03-18 RX ORDER — GLYCOPYRROLATE 0.2 MG/ML
INJECTION INTRAMUSCULAR; INTRAVENOUS AS NEEDED
Status: DISCONTINUED | OUTPATIENT
Start: 2022-03-18 | End: 2022-03-18 | Stop reason: HOSPADM

## 2022-03-18 RX ORDER — DEXAMETHASONE SODIUM PHOSPHATE 4 MG/ML
INJECTION, SOLUTION INTRA-ARTICULAR; INTRALESIONAL; INTRAMUSCULAR; INTRAVENOUS; SOFT TISSUE AS NEEDED
Status: DISCONTINUED | OUTPATIENT
Start: 2022-03-18 | End: 2022-03-18 | Stop reason: HOSPADM

## 2022-03-18 RX ORDER — NEOSTIGMINE METHYLSULFATE 1 MG/ML
INJECTION, SOLUTION INTRAVENOUS AS NEEDED
Status: DISCONTINUED | OUTPATIENT
Start: 2022-03-18 | End: 2022-03-18 | Stop reason: HOSPADM

## 2022-03-18 RX ORDER — DEXAMETHASONE SODIUM PHOSPHATE 4 MG/ML
INJECTION, SOLUTION INTRA-ARTICULAR; INTRALESIONAL; INTRAMUSCULAR; INTRAVENOUS; SOFT TISSUE
Status: COMPLETED | OUTPATIENT
Start: 2022-03-18 | End: 2022-03-18

## 2022-03-18 RX ORDER — MIDAZOLAM HYDROCHLORIDE 1 MG/ML
2 INJECTION, SOLUTION INTRAMUSCULAR; INTRAVENOUS ONCE
Status: COMPLETED | OUTPATIENT
Start: 2022-03-18 | End: 2022-03-18

## 2022-03-18 RX ORDER — FENTANYL CITRATE 50 UG/ML
100 INJECTION, SOLUTION INTRAMUSCULAR; INTRAVENOUS ONCE
Status: COMPLETED | OUTPATIENT
Start: 2022-03-18 | End: 2022-03-18

## 2022-03-18 RX ORDER — CEFAZOLIN SODIUM/WATER 2 G/20 ML
2 SYRINGE (ML) INTRAVENOUS ONCE
Status: COMPLETED | OUTPATIENT
Start: 2022-03-18 | End: 2022-03-18

## 2022-03-18 RX ORDER — KETOROLAC TROMETHAMINE 10 MG/1
10 TABLET, FILM COATED ORAL
Qty: 20 TABLET | Refills: 0 | Status: SHIPPED | OUTPATIENT
Start: 2022-03-18 | End: 2022-03-23

## 2022-03-18 RX ADMIN — SODIUM CHLORIDE, SODIUM LACTATE, POTASSIUM CHLORIDE, AND CALCIUM CHLORIDE 100 ML/HR: 600; 310; 30; 20 INJECTION, SOLUTION INTRAVENOUS at 09:31

## 2022-03-18 RX ADMIN — DEXAMETHASONE SODIUM PHOSPHATE 4 MG: 4 INJECTION, SOLUTION INTRAMUSCULAR; INTRAVENOUS at 10:31

## 2022-03-18 RX ADMIN — Medication 3 MG: at 11:54

## 2022-03-18 RX ADMIN — GLYCOPYRROLATE 0.4 MG: 0.2 INJECTION, SOLUTION INTRAMUSCULAR; INTRAVENOUS at 11:54

## 2022-03-18 RX ADMIN — ROPIVACAINE HYDROCHLORIDE 35 ML: 5 INJECTION, SOLUTION EPIDURAL; INFILTRATION; PERINEURAL at 10:31

## 2022-03-18 RX ADMIN — LIDOCAINE HYDROCHLORIDE 60 MG: 20 INJECTION, SOLUTION EPIDURAL; INFILTRATION; INTRACAUDAL; PERINEURAL at 10:54

## 2022-03-18 RX ADMIN — ROCURONIUM BROMIDE 50 MG: 50 INJECTION, SOLUTION INTRAVENOUS at 10:54

## 2022-03-18 RX ADMIN — Medication 2 G: at 10:58

## 2022-03-18 RX ADMIN — MIDAZOLAM 2 MG: 1 INJECTION INTRAMUSCULAR; INTRAVENOUS at 10:24

## 2022-03-18 RX ADMIN — DEXAMETHASONE SODIUM PHOSPHATE 5 MG: 4 INJECTION, SOLUTION INTRA-ARTICULAR; INTRALESIONAL; INTRAMUSCULAR; INTRAVENOUS; SOFT TISSUE at 11:03

## 2022-03-18 RX ADMIN — FENTANYL CITRATE 100 MCG: 50 INJECTION INTRAMUSCULAR; INTRAVENOUS at 10:24

## 2022-03-18 RX ADMIN — HALOPERIDOL LACTATE 1 MG: 5 INJECTION, SOLUTION INTRAMUSCULAR at 12:17

## 2022-03-18 RX ADMIN — PROPOFOL 30 MG: 10 INJECTION, EMULSION INTRAVENOUS at 11:52

## 2022-03-18 RX ADMIN — LIDOCAINE HYDROCHLORIDE 0.1 ML: 10 INJECTION, SOLUTION INFILTRATION; PERINEURAL at 09:31

## 2022-03-18 RX ADMIN — PROPOFOL 200 MG: 10 INJECTION, EMULSION INTRAVENOUS at 10:54

## 2022-03-18 RX ADMIN — ONDANSETRON 4 MG: 2 INJECTION INTRAMUSCULAR; INTRAVENOUS at 11:36

## 2022-03-18 RX ADMIN — SODIUM CHLORIDE, SODIUM LACTATE, POTASSIUM CHLORIDE, AND CALCIUM CHLORIDE: 600; 310; 30; 20 INJECTION, SOLUTION INTRAVENOUS at 11:36

## 2022-03-18 NOTE — PERIOP NOTES
Discharge teaching/follow up appt reviewed with patient and caregiver. Caregiver voiced understanding of teaching. All questions answered.

## 2022-03-18 NOTE — ANESTHESIA PROCEDURE NOTES
Peripheral Block    Start time: 3/18/2022 10:28 AM  End time: 3/18/2022 10:31 AM  Performed by: Iggy Antonio MD  Authorized by: Iggy Antonio MD       Pre-procedure: Indications: at surgeon's request, post-op pain management and procedure for pain    Preanesthetic Checklist: patient identified, risks and benefits discussed, site marked, timeout performed, anesthesia consent given and patient being monitored    Timeout Time: 10:28 EDT  Preanesthetic Checklist comment:  Risk of nerve damage discussed. Block Type:   Block Type: Interscalene  Laterality:  Left  Monitoring:  Standard ASA monitoring, continuous pulse ox, frequent vital sign checks, heart rate, oxygen and responsive to questions  Injection Technique:  Single shot  Procedures: ultrasound guided and nerve stimulator    Prep: chlorhexidine    Needle Type:  Stimuplex (4 Inch)  Needle Gauge:  20 G  Needle Localization:  Ultrasound guidance and nerve stimulator  Motor Response comment:   Motor Response: minimal motor response >0.4 mA   Medication Injected:  Ropivacaine 0.375% with epi 1:200,000 in NS injection, 35 mL (Mixture components: ropivacaine (PF) 5 mg/mL (0.5 %) Soln, . 75 mL; EPINEPHrine HCl (PF) 1 mg/mL (1 mL) Soln, . 005 mL; 0.9% sodium chloride Soln, .245 mL)  dexamethasone (DECADRON) 4 mg/mL injection, 4 mg  Med Admin Time: 3/18/2022 10:31 AM    Assessment:  Number of attempts:  1  Injection Assessment:  Incremental injection every 5 mL, local visualized surrounding nerve on ultrasound, negative aspiration for blood, no paresthesia, no intravascular symptoms and ultrasound image on chart  Patient tolerance:  Patient tolerated the procedure well with no immediate complications  3 cc 1% lidocaine injected at site of needle insertion. Needle inserted and placed in close proximity to the nerve under real time ultrasound guidance.   Ultrasound was used to visualize the spread of local anesthetic in close proximity to the nerve being blocked. The nerve appeared anatomically normal and there were no abnormal findings.

## 2022-03-18 NOTE — ANESTHESIA PREPROCEDURE EVALUATION
Relevant Problems   No relevant active problems       Anesthetic History   No history of anesthetic complications            Review of Systems / Medical History  Pertinent labs reviewed    Pulmonary            Asthma : well controlled       Neuro/Psych         Headaches (migraine) and psychiatric history     Cardiovascular            Dysrhythmias (palpitations, just takes magnesium)       Exercise tolerance: >4 METS     GI/Hepatic/Renal  Within defined limits              Endo/Other        Arthritis and anemia (sickle cell trait)     Other Findings            Physical Exam    Airway  Mallampati: II  TM Distance: 4 - 6 cm  Neck ROM: normal range of motion   Mouth opening: Normal     Cardiovascular  Regular rate and rhythm,  S1 and S2 normal,  no murmur, click, rub, or gallop             Dental  No notable dental hx       Pulmonary  Breath sounds clear to auscultation               Abdominal  GI exam deferred       Other Findings            Anesthetic Plan    ASA: 2  Anesthesia type: general      Post-op pain plan if not by surgeon: peripheral nerve block single    Induction: Intravenous  Anesthetic plan and risks discussed with: Patient and Spouse

## 2022-03-18 NOTE — BRIEF OP NOTE
BRIEF OPERATIVE NOTE    Date of Procedure: 3/18/2022     Preoperative Diagnosis:  PARTIAL THICKNESS ROTATOR CUFF TEAR LEFT SHOULDER      BICEPITAL STRAIN LEFT SHOULDER      AC OA LEFT SHOULDER    Postoperative Diagnosis:  SAME    Procedure(s): ARTHROSCOPY LEFT SHOULDER ARTHROSCOPIC SUBACROMIAL DECOMPRESSION WITHOUT ACROMIOPLASTY, DISTAL CLAVICLE RESECTION, EXTENSIVE DEBRIDEMENT BICEPS LABRAL COMPLEX, GLENOHUMERAL JOINT, SUBACROMIAL SPACE, MINI OPEN ROTATOR CUFF REPAIR, BICEPS TENODESIS    Surgeon(s) and Role:     * Camille Solis MD - Primary         Assistant Staff:  NONE      Surgical Staff:  Circ-1: Angela Villagran RN  Scrub Tech-1: Nidia Hernandez  Scrub Tech-2: Mignon Paget  Scrub Tech-3: Jaimie Marshall case tracking events are documented in the log. Anesthesia:  GENERAL WITH INTERSCALENE BLOCK    Estimated Blood Loss: 30 cc. Complications: NONE    Implants:   Implant Name Type Inv. Item Serial No.  Lot No. LRB No. Used Action   ANCHOR SUT DIA2.3MM W/ NDL 2 STRND NO2 [facility] - [id number]  ANCHOR SUT DIA2.3MM W/ NDL 2 STRND NO2 [facility]  [employer] [id number] Right 1 Implanted   ANCHOR SUT DIA5. 5MM PEEK ZIP W/ NDL - [id number]  ANCHOR SUT DIA5. 5MM PEEK ZIP W/ NDL  [employer] [id number] Right 2 Implanted       Kathleen Claude., MD

## 2022-03-18 NOTE — H&P
Date of Surgery Update:  Yasmani Malone was seen and examined. History and physical has been reviewed. The patient has been examined.  There have been no significant clinical changes since the completion of the originally dated History and Physical.    Signed By: Kale Rain MD     March 18, 2022 8:16 AM

## 2022-03-18 NOTE — DISCHARGE INSTRUCTIONS
INSTRUCTIONS FOLLOWING ARTHROSCOPY SURGERY  Dr. Noah Wood [phone]    ACTIVITY   As tolerated and as directed by your doctor   Elevate surgery site first 48 hours.  Use arm sling or crutches per your doctors instructions.  Bathe or shower as directed by your doctor. DIET   Clear liquids until no nausea or vomiting; then light diet for the first day   Advance to regular diet on second day, unless your doctor orders otherwise.  If nausea and vomiting continues, call your doctor. PAIN   Take pain medication as directed by your doctor.  Call your doctor if pain is NOT relieved by medication.  DO NOT take aspirin or blood thinners until directed by your doctor. DRESSING CARE: Follow all dressing care instructions provided by Dr. Ellie Argueta will be made by nursing staff.  If you have any problems or concerns, call your doctor as needed. CALL YOUR DOCTOR IF   Excessive bleeding that does not stop after holding mild pressure over the area   Temperature of 101°F or above   Redness, excessive swelling or bruising, and/or green or yellow, smelly discharge from incision    AFTER ANESTHESIA   For the next 24 hours: DO NOT Drive, Drink alcoholic beverages, or Make important decisions.  Be aware of dizziness following anesthesia and while taking pain medication. MEDICATIONS:  Continue home medications as previously prescribed with the following changes: {None:63445::\"none\"}.     Cryo Cuff or Iceman 24-48 hours continuously

## 2022-03-18 NOTE — ANESTHESIA POSTPROCEDURE EVALUATION
Procedure(s):  ARTHROSCOPY LEFT SHOULDER ARTHROSCOPIC SUBACROMIAL DECOMPRESSION WITHOUT ACROMIOPLASTY, DISTAL CLAVICLE RESECTION, EXTENSIVE DEBRIDEMENT BICEPS LABRAL COMPLEX, GLENOHUMERAL JOINT, SUBACROMIAL SPACE, MINI OPEN ROTATOR CUFF REPAIR, BICEPS TENODESIS. general    Anesthesia Post Evaluation        Patient location during evaluation: PACU  Patient participation: complete - patient participated  Level of consciousness: awake  Pain management: satisfactory to patient  Airway patency: patent  Anesthetic complications: no  Cardiovascular status: hemodynamically stable  Respiratory status: spontaneous ventilation  Hydration status: euvolemic  Post anesthesia nausea and vomiting:  none      INITIAL Post-op Vital signs:   Vitals Value Taken Time   /86 03/18/22 1250   Temp 36.7 °C (98.1 °F) 03/18/22 1206   Pulse 51 03/18/22 1252   Resp 22 03/18/22 1251   SpO2 95 % 03/18/22 1252   Vitals shown include unvalidated device data.

## 2022-03-18 NOTE — OP NOTES
[address] [address]  OPERATIVE REPORT    Name:  Solange Zimmer  MR#:  [id number]  :  1980  ACCOUNT #:  [de-identified]  DATE OF SERVICE:  2022    PREOPERATIVE DIAGNOSES:  1. Partial-thickness rotator cuff tear, left shoulder. 2.  Bicipital strain, left shoulder. 3.  Acromioclavicular joint arthritis, left shoulder. POSTOPERATIVE DIAGNOSES:  1. Partial-thickness rotator cuff tear, left shoulder. 2.  Bicipital strain, left shoulder. 3.  Acromioclavicular joint arthritis, left shoulder. PROCEDURES PERFORMED:  Arthroscopy of left shoulder, arthroscopic subacromial decompression without acromioplasty, arthroscopic distal clavicle resection, extensive debridement of biceps labral complex, glenohumeral joint, subacromial space, mini-open rotator cuff repair and biceps tenodesis. SURGEON:  Justine Noonan. Kelli Aden MD        ANESTHESIA:  General with an interscalene block. COMPLICATIONS:  None. IMPLANTS:  Hardware utilized are two 5.5 Dane anchors and one Iconix 2.3 anchor. ESTIMATED BLOOD LOSS:  30 mL. FINDINGS:  1. Type 2 acromion. 2.  AC joint arthritis. 3. Bicipital strain. 4.  High-grade partial-thickness rotator cuff tear involving supraspinatus. CPT CODES:  52278, [id number], [id number], [id number]. ICD-10 CODES:  Q54.642, D15.980, M19.012. INDICATIONS:  The patient is a 79-year-old female who injured her left shoulder. Preoperative physical exam, radiographs and an MRI demonstrate a partial-thickness rotator cuff tear, bicipital strain, and AC joint arthritis. The patient has exhausted nonoperative modalities and electively admitted for operative intervention. PROCEDURE:  Following identification of the patient, the patient was taken to the operative suite. Following administration of general anesthesia, interscalene block for postop pain control, 2 g of IV Ancef, the patient was positioned on the operative table in the supine fashion.   Left shoulder was examined under anesthesia and noted to be stable through full range of motion. At this point, the patient was carefully positioned in the lateral decubitus position right side down. Axillary roll was placed. Beanbag was inflated. Care was taken to pad both dependent lower extremities. Left arm was placed in the [employer] traction device in 15 pounds of traction. Left shoulder was then prepped and draped in the sterile fashion. Subacromial space was then injected with 10 mL of 1% Xylocaine with epinephrine. Scope was introduced into the shoulder. Diagnostic arthroscopy was then commenced. Articular surfaces of the humeral head and glenoid were visualized and noted to be intact. The anterior, posterior, superior, and inferior labrum were visualized. There was no evident SLAP or labral tear. There was evidence of bicipital strain as it exited the glenohumeral joint in conjunction with the high-grade partial-thickness rotator cuff tear involving supraspinatus. Subscapularis, infraspinatus, and teres minor were intact. Scope was then flip-flopped from the posterior to anterior portal.  Posterior cuff and labrum were visualized and intact. Scope was then placed in the subacromial space. Lateral portal was then established. Hypertrophic hemorrhagic bursal tissue was then resected. Bursal side of the cuff was visualized and intact. With use of Oratec wand and acromionizing bur, an arthroscopic distal clavicle resection was then performed. The distal 10 mm and 10 mm of the distal clavicle was then resected. Care was taken to preserve the posterior superior capsule. With use of 5.5 full resector, an arthroscopic subacromial decompression without acromioplasty was then performed. At this point, given the combination of pathology, it was elected to perform a mini-open approach. The lateral portal was extended to 3 cm. Deltoid split was carried up to the acromion. The biceps tendon was identified.   It was dissected free.  It was markedly tendinopathic. It was tagged, transected, and tenodesed utilizing one 5.5 Dane anchor, one Iconix 2.3 anchor and oversewn using #2 Mersilene suture. This yielded an excellent biceps tenodesis. An additional 5.5 anchor was placed in the greater tuberosity. All limbs of all sutures were passed and secured yielding an excellent cuff repair. The scope was then placed back in the glenohumeral joint. Biceps labral complex was then debrided. Undersurface of the rotator cuff was visualized. Anatomic footprint was reestablished. Scope was then placed back on the bursal side. Bursal side of the cuff repair was stable. Biceps tenodesis was stable. At this point, with the procedure complete, arthroscopic equipments were removed from the shoulder. The portals were approximated using 2-0 nylon horizontal mattress sutures. Lateral wound was closed with 0 Vicryl figure-of-eight sutures and a 2-0 Prolene subcuticular stitch. A sterile dressing was applied. A sling and swathe was applied. The patient was then transferred to the recovery room in stable condition. Mendel Gola, MD [id number]  D:  03/18/2022 12:06  T:  03/18/2022 18:15  JOB #:  [id number]  CC:   Sotero Cross MD

## 2022-03-24 PROBLEM — S46.112A STRAIN OF LONG HEAD OF LEFT BICEPS: Status: ACTIVE | Noted: 2022-03-15

## 2022-03-24 PROBLEM — M19.012 DEGENERATIVE JOINT DISEASE OF LEFT ACROMIOCLAVICULAR JOINT: Status: ACTIVE | Noted: 2022-03-15

## 2022-03-24 PROBLEM — S46.012A TRAUMATIC INCOMPLETE TEAR OF LEFT ROTATOR CUFF: Status: ACTIVE | Noted: 2022-03-15

## 2022-04-08 ENCOUNTER — HOSPITAL ENCOUNTER (OUTPATIENT)
Dept: PHYSICAL THERAPY | Age: 42
Discharge: HOME | End: 2022-04-08
Attending: ORTHOPAEDIC SURGERY
Payer: MEDICARE

## 2022-04-08 DIAGNOSIS — Z48.89 ENCOUNTER FOR POSTOPERATIVE CARE: ICD-10-CM

## 2022-04-08 PROCEDURE — 97110 THERAPEUTIC EXERCISES: CPT

## 2022-04-08 PROCEDURE — 97162 PT EVAL MOD COMPLEX 30 MIN: CPT
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
## 2022-04-08 NOTE — PROGRESS NOTES
Riki Olsen  : 1980  [place] / Product Type: Medicare /  [address] at [facility] ROBERT LUNDY  [address] [place] [address], [address], [place] [address].  [phone]   [phone]       OUTPATIENT PHYSICAL THERAPY: Daily Treatment Note 2022  Visit Count: 1     ICD-10: Treatment Diagnosis: Left shoulder pain (M25.212), left shoulder stiffness (M25.612)  Precautions/Allergies: post-op, PROM/  Latex, Amoxicillin, Imitrex [sumatriptan succinate], Prilosec [omeprazole], and Sulfa (sulfonamide antibiotics)   TREATMENT PLAN:  Effective Dates: 2022 TO 2022 (90 days). Frequency/Duration: 2 times a week for 90 Day(s) MEDICAL/REFERRING DIAGNOSIS:  Encounter for postoperative care [Z48.89]   DATE OF ONSET:   [id number]  Surgery: 3/18/22  REFERRING PHYSICIAN: Gi Farooq MD MD Orders: PT- evaluate and treat, HEP, PROM  Return MD Appointment: 22       Pre-treatment Symptoms/Complaints:  Pt complains of constant ache of the left shoulder that becomes sharp with attempts to move. Doing the pendulum and hand exercises. Not sleeping. Trying to use \"ice machine\"  Pain: Initial:   8-10/10 Post Session:  No number given   Medications Last Reviewed:  22    Updated Objective Findings:   See evaluation note from today     TREATMENT:   Therapeutic Exercise: (10 Minutes): PROM left shoulder within post-op limits and per tolerance. Exercises  to improve mobility and strength. Required moderate visual, verbal and tactile cues to promote proper body posture and technique per ROM and post-op limit. Reviewed and issued HEP with gripping squeezes, rubber band finger ext, supported sup/pron. Modalities(10 minutes) moist heat to neck and ice to let shoulder for pain control after end of session       Treatment/Session Summary:    · Response to Treatment:  Good return demonstration of exercise after instruction.   · Communication/Consultation:  None today  · Equipment provided today:  None today  · Recommendations/Intent for next treatment session: Next visit will focus on ROM, modalities for pain control.  Review HEP    Total Treatment Billable Duration:  10 minutes in addition to evaluation  PT Patient Time In/Time Out  Time In: 1020  Time Out: [address] PT    Future Appointments   Date Time Provider Chelsea Rose   4/12/2022  9:45 AM Wicho Freire, PT [facility]   4/15/2022 12:30 PM Flor Griffith, PTA [facility]   4/19/2022  2:30 PM Dorlucastha Lass, PT [facility]   4/20/2022  9:15 AM Yu Solis MD [facility]   4/20/2022  9:30 AM Doreatha Lass, PT [facility]   4/26/2022 10:45 AM Doreatha Lass, PT [facility]   4/29/2022 11:15 AM Doreatha Lass, PT [facility]

## 2022-04-12 ENCOUNTER — HOSPITAL ENCOUNTER (OUTPATIENT)
Dept: PHYSICAL THERAPY | Age: 42
Discharge: HOME | End: 2022-04-12
Attending: ORTHOPAEDIC SURGERY
Payer: MEDICARE

## 2022-04-12 PROCEDURE — 97110 THERAPEUTIC EXERCISES: CPT

## 2022-04-12 NOTE — PROGRESS NOTES
Stephan Mock  : 1980  [place] / Product Type: Medicare /  [address]  at [facility] ROBERT LUNDY  [address] [place] [address], [address], [place] [address].  [phone]   [phone]       OUTPATIENT PHYSICAL THERAPY: 2022       ICD-10: Treatment Diagnosis: Left shoulder pain (M25.212), left shoulder stiffness (M25.612)  Precautions/Allergies: post-op, PROM/  Latex, Amoxicillin, Imitrex [sumatriptan succinate], Prilosec [omeprazole], and Sulfa (sulfonamide antibiotics)   TREATMENT PLAN:  Effective Dates: 2022 TO 2022 (90 days). Frequency/Duration: 2 times a week for 90 Day(s) MEDICAL/REFERRING DIAGNOSIS:  No admission diagnoses are documented for this encounter. DATE OF ONSET:   Injury:  Surgery: 3/18/22  REFERRING PHYSICIAN: Moncho Mccarthy MD MD Orders: PT- evaluate and treat, HEP, PROM  Return MD Appointment: 22     Patient was seen by another therapist today.

## 2022-04-12 NOTE — PROGRESS NOTES
Danyell Service  : 1980  [place] / Product Type: Medicare /  [address] at [facility] ROBERT LUNDY  [address] [place] [address], [address], [place] [address].  [phone]   [phone]       OUTPATIENT PHYSICAL THERAPY: Daily Treatment Note 2022  Visit Count: 2     ICD-10: Treatment Diagnosis: Left shoulder pain (M25.212), left shoulder stiffness (M25.612)  Precautions/Allergies: post-op, PROM/  Latex, Amoxicillin, Imitrex [sumatriptan succinate], Prilosec [omeprazole], and Sulfa (sulfonamide antibiotics)   TREATMENT PLAN:  Effective Dates: 2022 TO 2022 (90 days). Frequency/Duration: 2 times a week for 90 Day(s) MEDICAL/REFERRING DIAGNOSIS:  Encounter for other specified surgical aftercare [Z48.89]   DATE OF ONSET:   [id number]  Surgery: 3/18/22  REFERRING PHYSICIAN: Mervat Vizcarra MD MD Orders: PT- evaluate and treat, HEP, PROM  Return MD Appointment: 22       Pre-treatment Symptoms/Complaints:  Pt reports pain medication isn't helping. Doing the ice some. Taking gabapentin and hydrocodone. Not able to sleep. Want to lay on the side. Asking about progressing wrist exs. Pain: Initial:   8/10 Post Session:  -5/10   Medications Last Reviewed:  22    Updated Objective Findings:   Tender to palpate over the entire left shoulder and biceps region  Left arm held in guarded position  ROM:      LUE PROM  L Shoulder Flexion: 80  L Shoulder ABduction: 80  L Shoulder Internal Rotation: 74  L Shoulder External Rotation: 30  L Elbow Extension: 0                                 TREATMENT:   Therapeutic Exercise: (39 Minutes):ice used for pain control during PROM left shoulder within post-op limits and per tolerance for ER, IR, flexion and abd. Left levator scapula release. Gentle distraction mobilizations for pain relief. Exercises  to improve mobility and strength.   Required moderate visual, verbal and tactile cues to promote proper body posture and technique per ROM and post-op limit. Reviewed  HEP posture correction with scapular retraction/depression and chin tuck. Instructed pt on how to roll to right side and use pillows for placement and comfort of left shoulder; how to use a soup can for wrist flex/ext with arm supported  Modalities(10 minutes) ice to let shoulder for pain control after end of session       Treatment/Session Summary:    · Response to Treatment:  Good return demonstration of rolling to right side after instruction. Improved PROM left shoulder and elbow after treatment. · Communication/Consultation:  None today  · Equipment provided today:  None today  · Recommendations/Intent for next treatment session: Next visit will focus on ROM, modalities for pain control.     Total Treatment Billable Duration:  39 minutes   PT Patient Time In/Time Out  Time In: 0168  Time Out: [address], PT    Future Appointments   Date Time Provider Chelsea Rose   4/15/2022 12:30 PM [facility]   4/19/2022  2:30 PM Sowmya Cornejo PT [facility]   4/20/2022  9:15 AM Theresa Solis Asa., MD [facility]   4/20/2022  9:30 AM Sowmya Cornejo PT [facility]   4/26/2022 10:45 AM Sowmya Cornejo PT [facility]   4/29/2022 11:15 AM Sowmya Cornejo PT [facility]

## 2022-04-15 ENCOUNTER — HOSPITAL ENCOUNTER (OUTPATIENT)
Dept: PHYSICAL THERAPY | Age: 42
Discharge: HOME | End: 2022-04-15
Attending: ORTHOPAEDIC SURGERY
Payer: MEDICARE

## 2022-04-15 PROCEDURE — 97110 THERAPEUTIC EXERCISES: CPT

## 2022-04-15 NOTE — PROGRESS NOTES
Bryon Yates  : 1980  [place] / Product Type: Medicare /  [address] at [facility] ROBERT LUNDY  [address] [place] [address], [address], [place] [address].  [phone]   [phone]       OUTPATIENT PHYSICAL THERAPY: Daily Treatment Note 4/15/2022  Visit Count: 3     ICD-10: Treatment Diagnosis: Left shoulder pain (M25.212), left shoulder stiffness (M25.612)  Precautions/Allergies: post-op, PROM/  Latex, Amoxicillin, Imitrex [sumatriptan succinate], Prilosec [omeprazole], and Sulfa (sulfonamide antibiotics)   TREATMENT PLAN:  Effective Dates: 2022 TO 2022 (90 days). Frequency/Duration: 2 times a week for 90 Day(s) MEDICAL/REFERRING DIAGNOSIS:  Encounter for other specified surgical aftercare [Z48.89]   DATE OF ONSET:   [id number]  Surgery: 3/18/22  REFERRING PHYSICIAN: Shawnee Morales MD MD Orders: PT- evaluate and treat, HEP, PROM  Return MD Appointment: 22       Pre-treatment Symptoms/Complaints:  Pt reports high levels of pain but she reports compliance with icing. She plans to take an ibuprofen prior to PT next visit. Pain: Initial:   9/10 left shoulder Post Session:  No increase   Medications Last Reviewed:  22    Updated Objective Findings:   Tender to palpate over the entire left shoulder and biceps region  Left arm held in guarded position  ROM:                                        TREATMENT:   Therapeutic Exercise: ( 40 minutes):ice used for pain control during PROM left shoulder within post-op limits and per tolerance for ER, IR, flexion and abd. Left levator scapula release. Gentle distraction mobilizations for pain relief. Exercises  to improve mobility and strength. Required moderate visual, verbal and tactile cues to promote proper body posture and technique per ROM and post-op limit. Scapula retraction 2x10 seated  Chin tuck x10 seated  Cervical AROM x10 ea direction seated    Modalities:  Ice to go      Treatment/Session Summary:    · Response to Treatment: Pt was tight and guarded at the beginning of therapy but she was able to relax more at the end of treatment with the ice pack. She allowed the therapist to passively flex the shoulder to approximately 90 degrees. · Communication/Consultation:  None today  · Equipment provided today:  None today  · Recommendations/Intent for next treatment session: Next visit will focus on ROM, modalities for pain control.     Total Treatment Billable Duration:  40 minutes   PT Patient Time In/Time Out  Time In: 1413  Time Out: [address], PTA    Future Appointments   Date Time Provider Chelsea Rsoe   4/18/2022 12:00 PM Kate Tellez [facility]   4/20/2022  9:15 AM Teresa Solis MD GARY [facility]   4/20/2022  9:30 AM [facility]   4/26/2022 10:30 AM ORB Tellez   4/29/2022 11:15 AM ROB T[facility]IUM

## 2022-04-18 ENCOUNTER — HOSPITAL ENCOUNTER (OUTPATIENT)
Dept: PHYSICAL THERAPY | Age: 42
Discharge: HOME | End: 2022-04-18
Attending: ORTHOPAEDIC SURGERY
Payer: MEDICARE

## 2022-04-18 PROCEDURE — 97014 ELECTRIC STIMULATION THERAPY: CPT

## 2022-04-18 PROCEDURE — 97110 THERAPEUTIC EXERCISES: CPT

## 2022-04-18 NOTE — PROGRESS NOTES
Adela Barroso  : 1980  [place] / Product Type: Medicare /  [address]  at [address] [place] [address] Rd  1101 [facility], [address], [place] [address].  [phone]   [phone]       OUTPATIENT PHYSICAL THERAPY: Daily Treatment Note 2022  Visit Count: 4     ICD-10: Treatment Diagnosis: Left shoulder pain (M25.212), left shoulder stiffness (M25.612)  Precautions/Allergies: post-op, PROM/  Latex, Amoxicillin, Imitrex [sumatriptan succinate], Prilosec [omeprazole], and Sulfa (sulfonamide antibiotics)   TREATMENT PLAN:  Effective Dates: 2022 TO 2022 (90 days). Frequency/Duration: 2 times a week for 90 Day(s) MEDICAL/REFERRING DIAGNOSIS:  Encounter for other specified surgical aftercare [Z48.89]   DATE OF ONSET:   [id number]  Surgery: 3/18/22  REFERRING PHYSICIAN: Luis Abreu MD MD Orders: PT- evaluate and treat, HEP, PROM  Return MD Appointment: 22       Pre-treatment Symptoms/Complaints:  Pt reports having a lot of pain. Was too active yesterday. Took ibuprofen and hydrocodone  Pain: Initial:   8-9/10 left shoulder Post Session:    Medications Last Reviewed:  22    Updated Objective Findings:   Left arm held in guarded position against her body  ROM:      LUE PROM  L Shoulder Flexion: 105  L Shoulder ABduction: 85  L Shoulder External Rotation: 30  L Elbow Extension: 0                                 TREATMENT:   Therapeutic Exercise: (28 Minutes): grade 4-- distraction mobilizations to left shoulder for pain and spasm control between lift positions. PROM left shoulder within post-op limits and per tolerance for ER, IR, flexion and abd. Left levator scapula release. Exercises  to improve mobility. Required moderate visual, verbal and tactile cues to promote proper body posture and technique per ROM and post-op limit.      Modalities:( 38 minutes) Ice with interferential electrical stimulation to tolerance to the left shoulder during and ( 10 minutes) after PROM for pain control      Treatment/Session Summary:    · Response to Treatment: much better tolerance to PROM with estim and ice combined. · Communication/Consultation:  None today  · Equipment provided today:  None today  · Recommendations/Intent for next treatment session: Next visit will focus on ROM, modalities for pain control.     Total Treatment Billable Duration:  38 minutes   PT Patient Time In/Time Out  Time In: 1450  Time Out: 24[facility],     Future Appointments   Date Time Provider Chelsea Rose   4/20/2022  9:15 AM Posta, Dennie Males., MD [facility]   4/20/2022  9:30 AM Cesar Kc, PT [facility]   4/26/2022 10:30 AM Cesar Kc, PT [facility]   4/29/2022 11:15 AM Cesar Kc, PT [facility]

## 2022-04-20 ENCOUNTER — HOSPITAL ENCOUNTER (OUTPATIENT)
Dept: PHYSICAL THERAPY | Age: 42
Discharge: HOME | End: 2022-04-20
Attending: ORTHOPAEDIC SURGERY
Payer: MEDICARE

## 2022-04-20 PROCEDURE — 97110 THERAPEUTIC EXERCISES: CPT

## 2022-04-20 NOTE — PROGRESS NOTES
Rick Santizo  : 1980  [place] / Product Type: Medicare /  [address] at [facility] ROBERT LUNDY  [address] [place] [address], [address], [place] [address].  [phone]   [phone]       OUTPATIENT PHYSICAL THERAPY: Daily Treatment Note 2022  Visit Count: 5     ICD-10: Treatment Diagnosis: Left shoulder pain (M25.212), left shoulder stiffness (M25.612)  Precautions/Allergies: post-op, full motion/full strength (22)  Latex, Amoxicillin, Imitrex [sumatriptan succinate], Prilosec [omeprazole], and Sulfa (sulfonamide antibiotics)   TREATMENT PLAN:  Effective Dates: 2022 TO 2022 (90 days). Frequency/Duration: 2 times a week for 90 Day(s) MEDICAL/REFERRING DIAGNOSIS:  Encounter for other specified surgical aftercare [Z48.89]   DATE OF ONSET:   [facility]:0298  Surgery: 3/18/22  REFERRING PHYSICIAN: Jeffery Grewal.MD MONTALVO Orders: PT- evaluate and treat, HEP, PROM.  (22) full motion/full strength, dry needling   Return MD Appointment:         Pre-treatment Symptoms/Complaints:  Pt reports being sore from the doctor moving her arm. Pain: Initial:   no number given Post Session: no number given   Medications Last Reviewed:  22- no change    Updated Objective Findings:   Guarded hold of the left arm    ROM:      LUE PROM  L Shoulder Flexion: 110  L Shoulder ABduction: 85  L Shoulder External Rotation: 30                                 TREATMENT:   Therapeutic Exercise: (28 Minutes): grade 4-- distraction mobilizations to left shoulder for pain and spasm control between lift positions. PROM to grade 4-- physiological mobilizations left shoulder ER at IR, abd and flexion . Exercises  to improve mobility.   Required moderate visual, verbal and tactile cues to promote proper body posture and technique per ROM and post-op limit Reviewed and issued HEP with supine wand forward elevation, supine wand ER, standing posture correction with chin tuck while performing B long arm shoulder ER/IR and bicep curls. Reviewed technique with pendulums. Modalities:(  minutes) Ice give to take after session for pain control      Treatment/Session Summary:    · Response to Treatment: improved motion and tolerance to ROM treatment. Good return demonstration of exercises. · Communication/Consultation:  None today  · Equipment provided today:  None today  · Recommendations/Intent for next treatment session: Next visit will focus on ROM, muscle facilitation modalities for pain control.     Total Treatment Billable Duration: 28    minutes   PT Patient Time In/Time Out  Time In: 0930  Time Out: Francia Marte 178, PT    Future Appointments   Date Time Provider Chelsea Rose   4/26/2022 10:30 AM Geraldine Fall, PT [facility]   4/29/2022 11:15 AM Geraldine Fall, PT [facility]   5/2/2022 10:15 AM Miryam De La Rosa, PT [facility]   5/4/2022 11:15 AM Geraldine Fall, PT [facility]   5/6/2022  9:30 AM Lethia Dye, PT [facility]   5/9/2022  9:45 AM Geraldine Fall, PT [facility]   5/11/2022  9:00 AM Geraldine Fall, PT [facility]   5/13/2022 11:00 AM Geraldine Fall, PT [facility]   5/16/2022  9:45 AM Geraldine Fall, PT [facility]   5/17/2022  9:00 AM Ny Solis, MD [facility]   5/18/2022  9:00 AM Geraldine Fall, PT [facility]   5/20/2022 10:15 AM Geraldine Fall, PT [facility]   5/23/2022  9:45 AM Geraldine Fall, PT [facility]   5/25/2022  9:00 AM Geraldine Fall, PT [facility]   5/27/2022 10:15 AM Geraldine Fall, PT [facility]   6/1/2022  9:00 AM [facility]   6/3/2022  8:30 AM [facility]

## 2022-04-26 ENCOUNTER — HOSPITAL ENCOUNTER (OUTPATIENT)
Dept: PHYSICAL THERAPY | Age: 42
Discharge: HOME | End: 2022-04-26
Attending: ORTHOPAEDIC SURGERY
Payer: MEDICARE

## 2022-04-29 ENCOUNTER — HOSPITAL ENCOUNTER (OUTPATIENT)
Dept: PHYSICAL THERAPY | Age: 42
Discharge: HOME | End: 2022-04-29
Attending: ORTHOPAEDIC SURGERY
Payer: MEDICARE

## 2022-04-29 PROCEDURE — 97110 THERAPEUTIC EXERCISES: CPT

## 2022-04-29 NOTE — PROGRESS NOTES
Frank Edmond  : 1980  [place] / Product Type: Medicare /  [address] at [facility] ROBERT LUNDY  [address] [place] [address], [address], [place] [address].  [phone]   [phone]       OUTPATIENT PHYSICAL THERAPY: Daily Treatment Note 2022  Visit Count: 6     ICD-10: Treatment Diagnosis: Left shoulder pain (M25.212), left shoulder stiffness (M25.612)  Precautions/Allergies: post-op, full motion/full strength (22)  Latex, Amoxicillin, Imitrex [sumatriptan succinate], Prilosec [omeprazole], and Sulfa (sulfonamide antibiotics)   TREATMENT PLAN:  Effective Dates: 2022 TO 2022 (90 days). Frequency/Duration: 2 times a week for 90 Day(s) MEDICAL/REFERRING DIAGNOSIS:  Encounter for other specified surgical aftercare [Z48.89]   DATE OF ONSET:   [id number]  Surgery: 3/18/22  REFERRING PHYSICIAN: Joan Ching MD MD Orders: PT- evaluate and treat, HEP, PROM.  (22) full motion/full strength, dry needling   Return MD Appointment:         Pre-treatment Symptoms/Complaints:  Pt reports doing the HEP. Pain: Initial:   4-5/10 Post Session:   4/10   Medications Last Reviewed:  22- oxycodone at 7am and Advil just coming to therapy    Updated Objective Findings:   Much more relaxed left arm    ROM: n/t                                       TREATMENT:   Therapeutic Exercise: (39 Minutes): PROM to grade 4-- physiological mobilizations left shoulder ER at IR, abd and flexion. Grade 4-- distraction mobilizations to left shoulder for pain and spasm control between lift positions . Exercises to improve active mobility. Required moderate visual, verbal and tactile cues to promote proper posture and technique. Reviewed HEP posture correction with chin tuck while performing exercises in sitting or standing.   AA= active assisted   Date:  22 Date:   Date:     Activity/Exercise Parameters Parameters Parameters   ER Supine AA ER/IR at side, 45 de and 75 deg abd;    Side AA 2x8     Chest press Supine 2x8 AA     Scapular retraction Side, arm supported 2x8     abd Side, to tolerance 2x8 AA                             Modalities:( 10 minutes) Ice with interferential electrical stimulation to tolerance for pain control after treatment session      Treatment/Session Summary:    · Response to Treatment: improving tolerance to active assisted motion and muscle facilitation. · Communication/Consultation:  None today  · Equipment provided today:  None today  · Recommendations/Intent for next treatment session: Next visit will focus on ROM, muscle facilitation modalities for pain control.  Assess upgrade HEP    Total Treatment Billable Duration:  39   minutes   PT Patient Time In/Time Out  Time In: 1118  Time Out: 865 [facility], PT    Future Appointments   Date Time Provider Chelsea Rose   5/2/2022 10:15 AM Ignacio Ruvalcaba, PT [facility]   5/4/2022 11:15 AM Prema Morita, PT [facility]   5/9/2022  9:45 AM Prema Morita, PT [facility]   5/11/2022  9:00 AM Prema Morita, PT [facility]   5/13/2022 11:00 AM Prema Morita, PT [facility]   5/16/2022  9:45 AM Prema Morita, PT [facility]   5/17/2022  9:00 AM Lexie Solis  Ephraim Marquez GARY   5/18/2022  9:00 AM Prema Morita, PT [facility]   5/20/2022 10:15 AM Prema Morita, PT [facility]   5/23/2022  9:45 AM Prema Morita, PT [facility]   5/25/2022  9:00 AM Prema Morita, PT [facility]   5/27/2022 10:15 AM Prema Morita, PT [facility]   6/1/2022  9:00 AM Prema Morita, PT [facility]   6/3/2022  8:30 AM Prema Morita, PT [facility]

## 2022-05-02 ENCOUNTER — HOSPITAL ENCOUNTER (OUTPATIENT)
Dept: PHYSICAL THERAPY | Age: 42
Setting detail: RECURRING SERIES
Discharge: HOME | End: 2022-05-05
Payer: MEDICARE

## 2022-05-02 ENCOUNTER — HOSPITAL ENCOUNTER (OUTPATIENT)
Dept: PHYSICAL THERAPY | Age: 42
Discharge: HOME | End: 2022-05-02
Attending: ORTHOPAEDIC SURGERY
Payer: MEDICARE

## 2022-05-02 PROCEDURE — 9990 CHARGE CONVERSION

## 2022-05-02 PROCEDURE — 97110 THERAPEUTIC EXERCISES: CPT

## 2022-05-02 NOTE — PROGRESS NOTES
Bryon Yates  : 1980  [place] / Product Type: Medicare /  [address] at [facility] ROBERT LUNDY  1101 [facility], [address], [address] [place] [address]  [phone]   [phone]       OUTPATIENT PHYSICAL THERAPY: Daily Treatment Note 2022  Visit Count: 7     ICD-10: Treatment Diagnosis: Left shoulder pain (M25.212), left shoulder stiffness (M25.612)  Precautions/Allergies: post-op, full motion/full strength (22)  Latex, Amoxicillin, Imitrex [sumatriptan succinate], Prilosec [omeprazole], and Sulfa (sulfonamide antibiotics)   TREATMENT PLAN:  Effective Dates: 2022 TO 2022 (90 days). Frequency/Duration: 2 times a week for 90 Day(s) MEDICAL/REFERRING DIAGNOSIS:  Encounter for other specified surgical aftercare [Z48.89]   DATE OF ONSET:   [id number]  Surgery: 3/18/22  REFERRING PHYSICIAN: Shawnee Morales MD MD Orders: PT- evaluate and treat, HEP, PROM.  (22) full motion/full strength, dry needling   Return MD Appointment:         Pre-treatment Symptoms/Complaints:  Pt reports it feels terrible. Pain: Initial:   7/10 Post Session:   7/10   Medications Last Reviewed:  22-     Updated Objective Findings:     ROM: n/t                                       TREATMENT:   Therapeutic Exercise: ( 40 minutes): PROM to grade 4-- physiological mobilizations left shoulder ER at IR, abd and flexion. Grade 4 distraction mobilizations to left shoulder for pain and spasm control between lift positions . Exercises to improve active mobility. Required moderate visual, verbal and tactile cues to promote proper posture and technique. Kinesio tape applied to inhibit bicep tendon and posterior glide to humerus for positional correction. She is to wear the for 1-2 days and remove if irritation.    AA= active assisted   Date:  22 Date:  22 Date:     Activity/Exercise Parameters Parameters Parameters   ER Supine AA ER/IR at side, 39 de and 75 deg abd;    Side AA 2x8 Side lying 2x10    Chest press Supine 2x8 AA Supine serratus press 10x    Scapular retraction Side, arm supported 2x8 Prone shoulder extension 2x8    abd Side, to tolerance 2x8 AA     Middle trap - Side lying with lift off 2x5    Lower trap - Side lying scapular depression 10x    t-band ER - Yellow 5x    t-band IR - Yellow 5x    t-band band shoulder extension - Yellow 10x                Treatment/Session Summary:    · Response to Treatment: Minimal pain reported with exercises performed today. Was going to issue yellow band for HEP but had pain and needed moderate verbal cueing proper form. May issue next session. · Communication/Consultation:  None today  · Equipment provided today:  None today  · Recommendations/Intent for next treatment session: Next visit will focus on ROM, muscle facilitation modalities for pain control.  Assess upgrade HEP    Total Treatment Billable Duration:  40   minutes   PT Patient Time In/Time Out  Time In: 1030  Time Out: [address], PT    Future Appointments   Date Time Provider Chelsea Rose   5/4/2022 11:15 AM Doreatha Lass, PT [facility]   5/9/2022  9:45 AM Doreatha Lass, PT [facility]   5/11/2022  9:00 AM Doreatha Lass, PT [facility]   5/13/2022 11:00 AM Doreatha Lass, PT [facility]   5/16/2022  9:45 AM Doreatha Lass, PT [facility]   5/17/2022  9:00 AM Yu Solis  Ephraim Marquez GARY   5/18/2022  9:00 AM Doreatha Lass, PT [facility]   5/20/2022 10:15 AM Doreatha Lass, PT [facility]   5/23/2022  9:45 AM Doreatha Lass, PT [facility]   5/25/2022  9:00 AM Doreatha Lass, PT [facility]   5/27/2022 10:15 AM Doreatha Lass, PT [facility]   6/1/2022  9:00 AM ROB Cohen [facility]   6/3/2022  8:30 AM [facility]

## 2022-05-04 ENCOUNTER — HOSPITAL ENCOUNTER (OUTPATIENT)
Dept: PHYSICAL THERAPY | Age: 42
Discharge: HOME | End: 2022-05-04
Attending: ORTHOPAEDIC SURGERY
Payer: MEDICARE

## 2022-05-04 PROCEDURE — 97110 THERAPEUTIC EXERCISES: CPT

## 2022-05-04 PROCEDURE — 9990 CHARGE CONVERSION

## 2022-05-04 NOTE — PROGRESS NOTES
Victoria Carbone  : 1980  [place] / Product Type: Medicare /  [address] at [facility] ROBERT LUNDY  [address] [place] [address], [address], [place] [address].  [phone]   [phone]       OUTPATIENT PHYSICAL THERAPY: Daily Treatment Note 2022  Visit Count: 8     ICD-10: Treatment Diagnosis: Left shoulder pain (M25.212), left shoulder stiffness (M25.612)  Precautions/Allergies: post-op, full motion/full strength (22)  Latex, Amoxicillin, Imitrex [sumatriptan succinate], Prilosec [omeprazole], and Sulfa (sulfonamide antibiotics)   TREATMENT PLAN:  Effective Dates: 2022 TO 2022 (90 days). Frequency/Duration: 2 times a week for 90 Day(s) MEDICAL/REFERRING DIAGNOSIS:  Encounter for other specified surgical aftercare [Z48.89]   DATE OF ONSET:   [id number]  Surgery: 3/18/22  REFERRING PHYSICIAN: Braydon Sifuentes MD MD Orders: PT- evaluate and treat, HEP, PROM.  (22) full motion/full strength, dry needling   Return MD Appointment:         Pre-treatment Symptoms/Complaints:  Pt reports it still hurts. Have a sharp pain that runs up the upper arm depending on how I move it . Still not sleeping. Not sure why the tape was supposed to help  Pain: Initial:      -7  /10 Post Session:   7/10   Medications Last Reviewed:  22- taking ibuprofen, gabapentin and hydrocodone    Updated Objective Findings:     ROM:      LUE PROM  L Shoulder Flexion: 112  L Shoulder ABduction: 91  L Shoulder External Rotation: 36                                 TREATMENT:   Therapeutic Exercise: (42 Minutes minutes):  grade 4-- physiological mobilizations left shoulder ER at side and 50 deg abd, IR at 55 deg abd, abd and flexion. Periodic Grade 4 distraction between lift positions for pain relief. Exercises to improve active mobility. Required minimal visual, verbal and tactile cues to promote proper posture and technique.    AA= active assisted Date:  4/29/22 Date:  5-2-22 Date:  5/4/22      Activity/Exercise Parameters Parameters Parameters   ER Supine AA ER/IR at side, 45 de and 75 deg abd;    Side AA 2x8 Side lying 2x10 Side 3x8;     Chest press Supine 2x8 AA Supine serratus press 10x 2x8   Scapular retraction Side, arm supported 2x8 Prone shoulder extension 2x8  side supported midtrap position, active 2x8   Serratus punch   0# 2x8   abd Side, to tolerance 2x8 AA     Middle trap - Side lying with lift off 2x5 Side supported protract to retract and lift 2x8   Lower trap - Side lying scapular depression 10x Side supported 2x8 scapular depression   t-band ER - Yellow 5x Yellow 2x5   t-band IR - Yellow 5x Yellow 2x5   t-band band shoulder extension - Yellow 10x Yellow 2x5         Modalities( 10 minutes): ice with interferential electrical stimulation after end of session for pain control       Treatment/Session Summary:    · Response to Treatment:  . · Communication/Consultation:  None today  · Equipment provided today:  None today  · Recommendations/Intent for next treatment session: Next visit will focus on ROM, muscle facilitation, begin strengthening as tolerated, modalities for pain control.  Issue yellow tband and assess prone exs    Total Treatment Billable Duration:  42   minutes   PT Patient Time In/Time Out  Time In: 1115  Time Out: 1431 [facility],     Future Appointments   Date Time Provider Chelsea Rose   5/9/2022  9:45 AM Geraldine Fall, PT [facility]   5/11/2022  9:00 AM Geraldine Fall, PT [facility]   5/13/2022 11:00 AM Geraldine Fall, PT [facility]   5/16/2022  9:45 AM Geraldine Fall, PT [facility]   5/17/2022  9:00 AM Ny Solis MD GARY GARY   5/18/2022  9:00 AM Geraldine Fall, PT [facility]   5/20/2022 10:15 AM Geraldine Fall, PT [facility]   5/23/2022  9:45 AM Geraldine Fall, PT [facility]   5/25/2022  9:00 AM Kate Tate [id number]   5/27/2022 10:15 AM ROB Tate [facility]   6/1/2022  9:00 AM ROB Tate [facility]   6/3/2022  8:30 AM ROB Taet [facility]

## 2022-05-06 ENCOUNTER — APPOINTMENT (OUTPATIENT)
Dept: PHYSICAL THERAPY | Age: 42
End: 2022-05-06
Attending: ORTHOPAEDIC SURGERY
Payer: MEDICARE

## 2022-05-09 ENCOUNTER — HOSPITAL ENCOUNTER (OUTPATIENT)
Dept: PHYSICAL THERAPY | Age: 42
End: 2022-05-09
Attending: ORTHOPAEDIC SURGERY
Payer: MEDICARE

## 2022-05-11 ENCOUNTER — HOSPITAL ENCOUNTER (OUTPATIENT)
Dept: PHYSICAL THERAPY | Age: 42
Discharge: HOME | End: 2022-05-11
Attending: ORTHOPAEDIC SURGERY
Payer: MEDICARE

## 2022-05-11 PROCEDURE — 9990 CHARGE CONVERSION

## 2022-05-11 PROCEDURE — 97110 THERAPEUTIC EXERCISES: CPT

## 2022-05-11 NOTE — PROGRESS NOTES
Alli Gramajo  : 1980  [place] / Product Type: Medicare /  [address] at [facility] ROBERT LUNDY  [facility], [address], [address] [place] [address]  [phone]   [phone]       OUTPATIENT PHYSICAL THERAPY: Daily Treatment Note 2022  Visit Count: 9     ICD-10: Treatment Diagnosis: Left shoulder pain (M25.212), left shoulder stiffness (M25.612)  Precautions/Allergies: post-op, full motion/full strength (22)  Latex, Amoxicillin, Imitrex [sumatriptan succinate], Prilosec [omeprazole], and Sulfa (sulfonamide antibiotics)   TREATMENT PLAN:  Effective Dates: 2022 TO 2022 (90 days). Frequency/Duration: 2 times a week for 90 Day(s) MEDICAL/REFERRING DIAGNOSIS:  Encounter for other specified surgical aftercare [Z48.89]   DATE OF ONSET:   [id number]  Surgery: 3/18/22  REFERRING PHYSICIAN: Judy Meyer MD MD Orders: PT- evaluate and treat, HEP, PROM.  (22) full motion/full strength, dry needling   Return MD Appointment:         Pre-treatment Symptoms/Complaints:  Pt reports doing the HEP. Thought the last appt was at another time and missed her visit  Pain: Initial:      4- 7/10 Post Session:  It's ok   Medications Last Reviewed:  22- taking ibuprofen, gabapentin and hydrocodone    Updated Objective Findings:   Good active depression of scapula, good posture    ROM:      LUE AROM  L Shoulder Flexion: 131 (supine forward elevation)  L Shoulder Internal Rotation: 64  L Shoulder External Rotation: 59 (at side,  55 deg  at 75 deg abd)                                 TREATMENT:   Therapeutic Exercise: (40 Minutes):  grade 4-- to 4- physiological mobilizations left shoulder ER at side and 50 deg abd, IR at 60 deg abd, abd and flexion. Exercises to improve active mobility. reviewed and issued upgraded HEP with yellow tband as below. Required minimal visual, verbal and tactile cues to promote proper posture and technique. AA= active assisted   Date:  4/29/22 Date:  5-2-22 Date:  5/4/22 5/11/22    Activity/Exercise Parameters Parameters Parameters     ER Supine AA ER/IR at side, 45 de and 75 deg abd;    Side AA 2x8 Side lying 2x10 Side 3x8;   Side 2x8    Chest press Supine 2x8 AA Supine serratus press 10x 2x8 2x8    Scapular retraction Side, arm supported 2x8 Prone shoulder extension 2x8  side supported midtrap position, active 2x8 [id number]    Serratus punch   0# 2x8 3x5    abd Side, to tolerance 2x8 AA   Side 2x5    Middle trap - Side lying with lift off 2x5 Side supported protract to retract and lift 2x8 Side supported 2x8    Lower trap - Side lying scapular depression 10x Side supported 2x8 scapular depression Side supported 2x8    t-band ER - Yellow 5x Yellow 2x5 Reviewed for HEP, yellow 2x5    t-band IR - Yellow 5x Yellow 2x5 Reviewed for HEP, yellow 2x5    t-band band shoulder extension - Yellow 10x Yellow 2x5 Reviewed for HEP, yellow 2x5            Modalities( 10 minutes): ice with interferential electrical stimulation after end of session for pain control       Treatment/Session Summary:    · Response to Treatment: improving motion. Good return demonstration of exercises   · Communication/Consultation:  None today  · Equipment provided today:  None today  · Recommendations/Intent for next treatment session: Next visit will focus on ROM, muscle facilitation, begin strengthening as tolerated, modalities for pain control.   assess prone exs    Total Treatment Billable Duration:  40   minutes   PT Patient Time In/Time Out  Time In: 0902  Time Out: Destin Lawler 1772, PT    Future Appointments   Date Time Provider Chelsea Rose   5/13/2022 11:00 AM Kate Bee [facility]   5/16/2022  9:45 AM ROB BeeORPTCRISTINA [facility]   5/17/2022  9:00 AM Domenic Solis MD [facility] GARY   5/18/2022  9:00 AM ROB Bee [facility]   5/20/2022 10:15 AM Marbella Combs PT [facility]   5/23/2022  9:45 AM Jovany Candi, PT [facility]   5/25/2022  9:00 AM Jovany Candi, PT [facility]   5/27/2022 10:15 AM Jovany Candi, PT [facility]   6/1/2022  9:00 AM Jovany Candi, PT [facility]   6/3/2022  8:30 AM Jovany Candi, PT [facility]

## 2022-05-13 ENCOUNTER — HOSPITAL ENCOUNTER (OUTPATIENT)
Dept: PHYSICAL THERAPY | Age: 42
Discharge: HOME | End: 2022-05-13
Attending: ORTHOPAEDIC SURGERY
Payer: MEDICARE

## 2022-05-13 PROCEDURE — 97110 THERAPEUTIC EXERCISES: CPT

## 2022-05-13 PROCEDURE — 9990 CHARGE CONVERSION

## 2022-05-13 NOTE — PROGRESS NOTES
Liz Bennett  : 1980  [place]  Secondary: [place] [facility] ALAMO[address], [address], [place] [address].  [phone]   [phone]       OUTPATIENT PHYSICAL THERAPY:Progress Report 2022   ICD-10: Treatment Diagnosis: Left shoulder pain (M25.512), left shoulder stiffness (M25.612)    Precautions/Allergies: post-op, PROM/ LATEX, Amoxicillin, Imitrex [sumatriptan succinate], Prilosec [omeprazole], and Sulfa (sulfonamide antibiotics)   TREATMENT PLAN:  Effective Dates: 2022 TO 2022 (90 days). Frequency/Duration: 2 times a week for 90 Day(s) MEDICAL/REFERRING DIAGNOSIS:  Encounter for other specified surgical aftercare [Z48.89]   DATE OF ONSET:   Injury:   Surgery:3/18/22  REFERRING PHYSICIAN: Frederick Conde MD MD Orders: PT- evaluate and treat, HEP, full motion, full strength (  Return MD Appointment: 22      ASSESSMENT:  Ms. Xavier Alberto has attended 10/12 visits with once cancellation and 1 no show for s/p left shoulder arthroscopy , arthroscopic subacromial decompression without acromioplasty, arthroscopic distal clavicle resection, extensive debridement of biceps labral complex, glenohumeral joint, subacromial space, mini-open rotator cuff repair and biceps tenodesis. She is making steady progress with good PROM for post-op phase of rehab. Therapy focus is progressing from ROM to muscle facilitation and to gentle strengthening of rotator cuff and scapular stabilizers. she continues to complain of left shoulder stiffness and pain but is demonstrating good effort with treatment. She needs to be able to care for her home and 10year old child. She will benefit from continued skilled therapy to address her deficits and assist in returning the functional use of her left arm for ADL's.             GOALS: (Goals have been discussed and agreed upon with patient.)  Short-Term Functional Goals: Time Frame: 4 weeks     1. Independent with HEP-being met     2. Left shoulder PROM flexion to 120 deg, IR to sacral border to allow progression to independent ADL's without compensation- met       Discharge Goals: Time Frame: 90 days     1. Tolerate exercise > 45 minutes to prepare for home and - being met     2. ADL's with pain < 3/10- not consistently met     3. LUE Strength 4+ to 5/5 to allow safe reaching and lifting-addressing     4. Decrease DASH score by at least 25 points to demonstrate improved function- addressing    OUTCOME MEASURE:   Tool Used: Disabilities of the Arm, Shoulder and Hand (DASH) Questionnaire - Quick Version  Score:  Initial: 53/55  Most Recent: 37/55 (Date: 5/13/22 )   Interpretation of Score: The DASH is designed to measure the activities of daily living in person's with upper extremity dysfunction or pain. Each section is scored on a 1-5 scale, 5 representing the greatest disability. The scores of each section are added together for a total score of 55.       Romana Rico, PT, MSPT         PAIN/SUBJECTIVE:   Initial:   3-7/10 left shoulder  Post Session:  No number given or complaint of increased pain, stiff      EXAMINATION:   Observation/Orthostatic Postural Assessment:          Mild shrugging during attempts to reach  Palpation:          Muscle activation of scapular stabilizer  ROM: elbow flexion, supination and wrist WNL     LUE AROM  L Shoulder Flexion: 133 (supine)  L Shoulder Internal Rotation:  (to sacral border)  L Shoulder External Rotation: 60          RUE AROM  R Shoulder Flexion: 110                    Strength:          Left shoulder grossly  4-/5

## 2022-05-13 NOTE — PROGRESS NOTES
Lucy Robin  : 1980  [place] / [place] / Product Type: Medicare /  [address] at [facility] ROBERT LUNDY  [address] [place] [address], [address], [place] [address].  [phone]   [phone]       OUTPATIENT PHYSICAL THERAPY: Daily Treatment Note 2022  Visit Count: 10     ICD-10: Treatment Diagnosis: Left shoulder pain (M25.212), left shoulder stiffness (M25.612)  Precautions/Allergies: post-op, full motion/full strength (22)  Latex, Amoxicillin, Imitrex [sumatriptan succinate], Prilosec [omeprazole], and Sulfa (sulfonamide antibiotics)   TREATMENT PLAN:  Effective Dates: 2022 TO 2022 (90 days). Frequency/Duration: 2 times a week for 90 Day(s) MEDICAL/REFERRING DIAGNOSIS:  Encounter for other specified surgical aftercare [Z48.89]   DATE OF ONSET:   [id number]  Surgery: 3/18/22  REFERRING PHYSICIAN: Mary Ruggiero., MD MONTALVO Orders: PT- evaluate and treat, HEP, PROM.  (22) full motion/full strength, dry needling   Return MD Appointment:         Pre-treatment Symptoms/Complaints:  Pt reports doing the HEP without problems. Woke up with some pain form laying on the left side  Pain: Initial:      No number given Post Session:  It's ok, feels stiff   Medications Last Reviewed:  22- taking ibuprofen, gabapentin and hydrocodone    Updated Objective Findings:    good posture but over uses upper trap    ROM: n/t                                       TREATMENT:   Therapeutic Exercise: (41 Minutes):  grade 4-- to 4- to 4 physiological mobilizations left shoulder ER at side to 70 deg abd, IR at 60 deg abd, abd and flexion. Exercises to improve active mobility. Required minimal visual, verbal and tactile cues to promote proper posture and technique.    AA= active assisted   Date:  22 Date:  22 Date:  22   Activity/Exercise Parameters Parameters Parameters     ER Supine AA ER/IR at side, 45 de and 75 deg abd;    Side AA 2x8 Side lying 2x10 Side 3x8;   Side 2x8 Side 1# 2x5, 0# x5   Chest press Supine 2x8 AA Supine serratus press 10x 2x8 2x8 1# 2x8   Scapular retraction Side, arm supported 2x8 Prone shoulder extension 2x8  side supported midtrap position, active 2x8 [id number] Side supported, gentle resist 2x8   Serratus punch   0# 2x8 3x5 1# 2x8   abd Side, to tolerance 2x8 AA   Side 2x5 Side 2x5   Middle trap - Side lying with lift off 2x5 Side supported protract to retract and lift 2x8 Side supported 2x8 Side supported retract and lift 2x8;    Propped 2x5   Lower trap - Side lying scapular depression 10x Side supported 2x8 scapular depression Side supported 2x8 Side supported 2x5;  Propped 0# 2x5   t-band ER - Yellow 5x Yellow 2x5 Reviewed for HEP, yellow 2x5    t-band IR - Yellow 5x Yellow 2x5 Reviewed for HEP, yellow 2x5    t-band band shoulder extension - Yellow 10x Yellow 2x5 Reviewed for HEP, yellow 2x5    IR     Reach to left hip 2x5 without compensating   Modalities(  minutes): none per pt ok    HEP: continue current program with yellow tband. Ice as needed    Treatment/Session Summary:    · Response to Treatment: improving muscle activation at scapula. Good return demonstration of increased weight or level of difficulty with exercises   · Communication/Consultation:  None today  · Equipment provided today:  None today  · Recommendations/Intent for next treatment session: Next visit will focus on ROM, muscle facilitation, strengthening modalities for pain control as needed.   assess prone exs next week    Total Treatment Billable Duration:  41   minutes   PT Patient Time In/Time Out  Time In: 1105  Time Out: [address], PT    Future Appointments   Date Time Provider Chelsea Rose   5/16/2022  9:45 AM Claudetta Dikes, PT LEONARD [facility]   5/17/2022  9:00 AM Main Solis., MD GARY GARY   5/18/2022  9:00 AM Claudetta Dikes, ROB VALLE [facility]   5/20/2022 10:15 AM Cynthia Robles Ruben Li, PT [facility]   5/23/2022  9:45 AM [place] Doles, PT [facility]   5/25/2022  9:00 AM [place] Doles, PT [facility]   5/27/2022 10:15 AM [place] Doles, PT [facility]   6/1/2022  9:00 AM [place] Doles, PT [facility]   6/3/2022  8:30 AM [place] Doles, PT [facility]

## 2022-05-16 ENCOUNTER — HOSPITAL ENCOUNTER (OUTPATIENT)
Dept: PHYSICAL THERAPY | Age: 42
Discharge: HOME | End: 2022-05-16
Attending: ORTHOPAEDIC SURGERY
Payer: MEDICARE

## 2022-05-18 ENCOUNTER — HOSPITAL ENCOUNTER (OUTPATIENT)
Dept: PHYSICAL THERAPY | Age: 42
Discharge: HOME | End: 2022-05-18
Attending: ORTHOPAEDIC SURGERY
Payer: MEDICARE

## 2022-05-18 PROCEDURE — 97110 THERAPEUTIC EXERCISES: CPT

## 2022-05-18 PROCEDURE — 9990 CHARGE CONVERSION

## 2022-05-18 NOTE — PROGRESS NOTES
Feliciano Montana  : 1980  [place] / Product Type: Medicare /  [address] at [facility] ROBERT LUNDY  [address] [place] [address], [address], [address] [place] [address]  [phone]   [phone]       OUTPATIENT PHYSICAL THERAPY: Daily Treatment Note 2022  Visit Count: 11     ICD-10: Treatment Diagnosis: Left shoulder pain (M25.212), left shoulder stiffness (M25.612)  Precautions/Allergies: post-op, full motion/full strength (22)  Latex, Amoxicillin, Imitrex [sumatriptan succinate], Prilosec [omeprazole], and Sulfa (sulfonamide antibiotics)   TREATMENT PLAN:  Effective Dates: 2022 TO 2022 (90 days). Frequency/Duration: 2 times a week for 90 Day(s) MEDICAL/REFERRING DIAGNOSIS:  Encounter for other specified surgical aftercare [Z48.89]   DATE OF ONSET:   [id number]  Surgery: 3/18/22  REFERRING PHYSICIAN: Antwon Rosario.MD MONTALVO Orders: PT- evaluate and treat, HEP, PROM.  (22) full motion/full strength, dry needling   Return MD Appointment:         Pre-treatment Symptoms/Complaints:  Pt reports being sick. Did some of the HEP   Pain: Initial:    4-610  Post Session:  Still 4-6/10 but tired   Medications Last Reviewed:  22- taking ibuprofen, gabapentin and oxycodone    Updated Objective Findings:   More fluent movements of shoulder, good placement of scapula    ROM:      LUE AROM  L Shoulder Flexion: 136 (standing)  L Shoulder Internal Rotation:  (to L5)                                 TREATMENT:   Therapeutic Exercise: (42 Minutes):  grade 4-- to 4- to 4 physiological mobilizations left shoulder ER at side to 70 deg abd, IR at 60 deg abd, abd and flexion for ROM. Exercises to improve active mobility. Required minimal visual, verbal and tactile cues to promote proper posture and technique.    AA= active assisted   Date:  22    Activity/Exercise Parameters       ER Side 3x8;   Side 2x8 Side 1# 2x5, 0# x5 Side 1# 2x8    Chest press 2x8 2x8 1# 2x8 1# 2x10    Scapular retraction  side supported midtrap position, active 2x8 [id number] Side supported, gentle resist 2x8     Serratus punch 0# 2x8 3x5 1# 2x8 1# 2x10    abd  Side 2x5 Side 2x5 Side 2x5    Middle trap Side supported protract to retract and lift 2x8 Side supported 2x8 Side supported retract and lift 2x8;    Propped 2x5 Side guided 2x5;    Propped 2x8    Lower trap Side supported 2x8 scapular depression Side supported 2x8 Side supported 2x5;  Propped 0# 2x5 Side guided  2x5;    Propped 2x5    t-band ER Yellow 2x5 Reviewed for HEP, yellow 2x5      t-band IR Yellow 2x5 Reviewed for HEP, yellow 2x5      t-band band shoulder extension Yellow 2x5 Reviewed for HEP, yellow 2x5  Red 2x8    IR   Reach to left hip 2x5 without compensating Reach behind the back    Modalities(  minutes): none per pt ok    HEP: continue current program with yellow tband. Ice as needed    Treatment/Session Summary:    · Response to Treatment: Good return demonstration of increased weight or reps. Improved motion  · Communication/Consultation:  None today  · Equipment provided today:  None today  · Recommendations/Intent for next treatment session: Next visit will focus on ROM, muscle facilitation, strengthening modalities for pain control as needed.   assess prone exs next visit    Total Treatment Billable Duration:  42   minutes   PT Patient Time In/Time Out  Time In: 0900  Time Out: [address] [place], PT    Future Appointments   Date Time Provider Chelsea Rose   5/20/2022 10:15 AM Isis Jacob PT [facility]   5/23/2022  9:45 AM Isis Jacob PT LEONARD [facility]   5/25/2022  9:00 AM ROB TellezORPTCRISTINA [facility]   5/27/2022 10:15 AM ROB TellezTCRISTINA [facility]   6/1/2022  9:00 AM ROB TellezORPTCRISTINA [facility]   6/3/2022  8:30 AM ROB Tellez [facility]

## 2022-05-23 ENCOUNTER — HOSPITAL ENCOUNTER (OUTPATIENT)
Dept: PHYSICAL THERAPY | Age: 42
Setting detail: RECURRING SERIES
Discharge: HOME | End: 2022-05-26
Payer: MEDICARE

## 2022-05-23 ENCOUNTER — APPOINTMENT (OUTPATIENT)
Dept: PHYSICAL THERAPY | Age: 42
End: 2022-05-23
Attending: ORTHOPAEDIC SURGERY
Payer: MEDICARE

## 2022-05-23 PROCEDURE — 97110 THERAPEUTIC EXERCISES: CPT

## 2022-05-23 NOTE — PROGRESS NOTES
Konrad Friend  : 1980  Primary: Medicare Part A And B  Secondary: Michael Cazares @ [address] [address]  Phone: [phone]  Fax: [phone] Plan Frequency: 2 times a week    Plan of Care/Certification Expiration Date: 22      PT Visit Info: Total # of Visits to Date: 15      OUTPATIENT PHYSICAL THERAPY:OP NOTE TYPE: Treatment Note 2022     Appt Desk   Episode      Treatment Diagnosis:  Left shoulder pain (M25.212),left shoulder stiffness (M25.612)  Medical/Referring Diagnosis:   Referring Physician:  Cecilia Ramirez.MD CORONA Orders:  PT Eval and Treat , full motion full strength (22)  Date of Onset:  Onset Date: 22 (surgery)     Allergies:  Latex, Sulfa antibiotics, Sumatriptan, Amoxicillin, and Omeprazole  Restrictions/Precautions:    No data recordedNo data recorded   Interventions Planned (Treatment may consist of any combination of the following):   Electrical stimulation  Home exercise program  Manual therapy  neuromusculular re-education/strengthening  Range of motion (ROM)  Subjective Comments:pt reports no new complaints. A little sore. Over slept Friday and missed the appt     Initial:5  /10 Post Session: 4/10  Medications Last Reviewed:  2022 ibuprofen, gabapentin and oxycodone- all PRN  Updated Objective Findings:  Shrugging with attempts to reach prior to stretching and exercise  Treatment   Therapeutic Exercise (40 minutes): grade 4- to 4 physiological mobilizations left shoulder ER, IR, flexion and abduction. Supine chest press 3# 2x10  Serratus punch 3# 2x10  Shoulder abd, side 1# 2x10  Shoulder ER, side 1# 3x5  midtrap prone 0# 2x5 guided at scapula  Low trap prone 0# 2x5 guided at scapula  Row prone 0# 2x5  Shoulder extn prone 0# 2x5    Treatment/Session Summary:    · Treatment Assessment: good effort with exercises.  Weak scapular stabilizers     · Communication/Consultation:  None today  · Equipment provided today:  None  · Recommendations/Intent for next treatment session: Next visit will focus on progressive scapular strengthening. Assess progressing to red tband for HEP.     Total Treatment Billable Duration:  40  minutes  Time In: 1968  Time Out: [address], PT       Post Session Pain  Charge Capture  [employer] Portal  MD Guidelines  Scanned Media  Benefits  MyChart

## 2022-05-25 ENCOUNTER — APPOINTMENT (OUTPATIENT)
Dept: PHYSICAL THERAPY | Age: 42
End: 2022-05-25
Attending: ORTHOPAEDIC SURGERY
Payer: MEDICARE

## 2022-05-25 ENCOUNTER — HOSPITAL ENCOUNTER (OUTPATIENT)
Dept: PHYSICAL THERAPY | Age: 42
Setting detail: RECURRING SERIES
Discharge: HOME | End: 2022-05-28
Payer: MEDICARE

## 2022-05-25 PROCEDURE — 97110 THERAPEUTIC EXERCISES: CPT

## 2022-05-25 ASSESSMENT — PAIN SCALES - GENERAL
PAINLEVEL_OUTOF10: 4
PAINLEVEL_OUTOF10: 4

## 2022-05-25 NOTE — PROGRESS NOTES
Anabel Gomez  : 1980  Primary: Medicare Part A And B  Secondary: Michael Cazares @ [facility] [address]  Phone: [phone]  Fax: [phone] Plan Frequency: 2 times a week    Plan of Care/Certification Expiration Date: 22      PT Visit Info: Total # of Visits to Date: 15      OUTPATIENT PHYSICAL THERAPY:OP NOTE TYPE: Treatment Note 2022     Appt Desk   Episode      Treatment Diagnosis:  Left shoulder pain (M25.212),left shoulder stiffness (M25.612)  Medical/Referring Diagnosis:   Referring Physician:  Allyssa Monterroso MD MD Orders:  PT Eval and Treat , full motion full strength (22)  Date of Onset:  Onset Date: 22 (surgery)     Allergies:  Latex, Sulfa antibiotics, Sumatriptan, Amoxicillin, and Omeprazole  Restrictions/Precautions:  none  No data recordedNo data recorded   Interventions Planned (Treatment may consist of any combination of the following):   Electrical stimulation  Home exercise program  Manual therapy  neuromusculular re-education/strengthening  Range of motion (ROM)  Subjective Comments:pt reports less pain. Ready to go up to red tband for home     Initial: 4/10 Post Session: 4/10  Medications Last Reviewed:  2022 ibuprofen, gabapentin and oxycodone- all PRN  Updated Objective Findings: muscle stabilization of scapula during exercise  Treatment   Therapeutic Exercise (41 minutes): grade 4- to 4 physiological mobilizations left shoulder ER, IR, flexion and abduction. Reviewed HEP tband exercise with red tband only on non therapy days and keeping the same reps.   Supine chest press 3#  2x12  Serratus punch 3# 2x12  Shoulder abd, side 0# 2x12, 1# x5  Shoulder ER, side 1# 2x12; supported scaption 1# 2x12  midtrap prone 0# 2x10 guided at scapula  Low trap prone 0# 2x10 guided at scapula  Row prone 0# 2x10  Shoulder extn prone 0# 2x10    Treatment/Session Summary:    · Treatment Assessment: good effort with increasing reps with exercises. Weak scapular stabilizers and shoulder but much improved muscle activation. · Communication/Consultation:  None today  · Equipment provided today:  None  · Recommendations/Intent for next treatment session: Next visit will focus on progressive scapular/shoulder strengthening. Assess quality with red tband for HEP.     Total Treatment Billable Duration:  41  minutes  Time In: 0900  Time Out: [address] [place], PT       Post Session Pain  Charge Capture  [employer] Portal  MD Guidelines  Scanned Media  Benefits  MyChart

## 2022-05-27 ENCOUNTER — HOSPITAL ENCOUNTER (OUTPATIENT)
Dept: PHYSICAL THERAPY | Age: 42
Setting detail: RECURRING SERIES
Discharge: HOME | End: 2022-05-30
Payer: MEDICARE

## 2022-05-27 ENCOUNTER — APPOINTMENT (OUTPATIENT)
Dept: PHYSICAL THERAPY | Age: 42
End: 2022-05-27
Attending: ORTHOPAEDIC SURGERY
Payer: MEDICARE

## 2022-05-27 PROCEDURE — 97110 THERAPEUTIC EXERCISES: CPT

## 2022-05-27 NOTE — PROGRESS NOTES
Chris Barth  : 1980  Primary: Medicare Part A And B  Secondary: 416 TITA Cazares @ [address] [address]  Phone: [phone]  Fax: [phone] Plan Frequency: 2 times a week    Plan of Care/Certification Expiration Date: 22      PT Visit Info: Total # of Visits to Date: 15      OUTPATIENT PHYSICAL THERAPY:OP NOTE TYPE: Treatment Note 2022     Appt Desk   Episode      Treatment Diagnosis:  Left shoulder pain (M25.212),left shoulder stiffness (M25.612)  Medical/Referring Diagnosis:   Referring Physician:  Carlene Burnette MD MD Orders:  PT Eval and Treat , full motion full strength (22)  Date of Onset:  Onset Date: 22 (surgery)     Allergies:  Latex, Sulfa antibiotics, Sumatriptan, Amoxicillin, and Omeprazole  Restrictions/Precautions:  none  No data recordedNo data recorded   Interventions Planned (Treatment may consist of any combination of the following):   Electrical stimulation  Home exercise program  Manual therapy  neuromusculular re-education/strengthening  Range of motion (ROM)  Subjective Comments:pt reports less pain. Ready to go up to red tband for home     Initial:3  /10 Post Session:      /10  Medications Last Reviewed:  2022 ibuprofen, gabapentin and oxycodone- all PRN  Updated Objective Findings: muscle stabilization of scapula during exercise  Treatment   Therapeutic Exercise (41 minutes): grade 4- to 4 physiological mobilizations left shoulder ER, IR, flexion and abduction.    Supine chest press 3#  2x12  Serratus punch 3# 2x12  Shoulder flexion reclined holding yellow tband for deltoid 2x8  Shoulder abd, side 0# x10, 1#  2x 10  Shoulder ER, side 1# 2x12; supported scaption 1# 2x12; yellow standing neutral 2x10  Shoulder IR supported scaption yellow tband 2x8; yellow standing 2x10, reach behind the back x10  midtrap prone 0# 2x10 with pt 2 sec hold at end range,guided at scapula  Low trap prone

## 2022-06-01 ENCOUNTER — HOSPITAL ENCOUNTER (OUTPATIENT)
Dept: PHYSICAL THERAPY | Age: 42
Setting detail: RECURRING SERIES
Discharge: HOME | End: 2022-06-04
Payer: MEDICARE

## 2022-06-01 ENCOUNTER — APPOINTMENT (OUTPATIENT)
Dept: PHYSICAL THERAPY | Age: 42
End: 2022-06-01
Attending: ORTHOPAEDIC SURGERY

## 2022-06-01 PROCEDURE — 97110 THERAPEUTIC EXERCISES: CPT

## 2022-06-01 NOTE — PROGRESS NOTES
Gurvinder Cook  : 1980  Primary: Medicare Part A And B  Secondary: Michael Cazares @ [address] [address]  Phone: [phone]  Fax: [phone] Plan Frequency: 2 times a week    Plan of Care/Certification Expiration Date: 22      PT Visit Info: Total # of Visits to Date: 13      OUTPATIENT PHYSICAL THERAPY:OP NOTE TYPE: Treatment Note 2022     Appt Desk   Episode      Treatment Diagnosis:  Left shoulder pain (M25.212),left shoulder stiffness (M25.612)  Medical/Referring Diagnosis:   Referring Physician:  Kerry Diaz MD MD Orders:  PT Eval and Treat , full motion full strength (22)  Date of Onset:  Onset Date: 22 (surgery)     Allergies:  Latex, Sulfa antibiotics, Sumatriptan, Amoxicillin, and Omeprazole  Restrictions/Precautions:  none  No data recordedNo data recorded   Interventions Planned (Treatment may consist of any combination of the following):   Electrical stimulation  Home exercise program  Manual therapy  neuromusculular re-education/strengthening  Range of motion (ROM)  Subjective Comments:pt reports less pain. using red tband for home but was not doing it totally right     Initial: -3  /10 Post Session: it's ok  Medications Last Reviewed:  2022 ibuprofen, gabapentin- PRN  Updated Objective Findings:  Minimal shrug with reaching prior to exercise  Treatment   Therapeutic Exercise (42  minutes): grade 4- to 4 physiological mobilizations left shoulder ER, IR, flexion and abduction.    Supine chest press 3#  x15 4# x10  Serratus punch 3# 2x12  Shoulder flexion reclined holding yellow tband for deltoid 2x10  Shoulder abd, side 1#  2x 10  Shoulder ER, side 1# 2x10; supported scaption 1# 2x12  Shoulder IR supported scaption yellow tband 2x10; yellow standing 2x10, reach behind the back x10  midtrap side 1# 2x5; prone 0# 2x8 with pt 3 sec hold at end range,guided at scapula  Low trap prone 0# 2x8 with 2 sec hold, guided at scapula  Row standing- HEP  Shoulder extn- HEP    Treatment/Session Summary:    · Treatment Assessment: good effort with increasing reps with exercises. Good awareness of quality movement     · Communication/Consultation:  None today  · Equipment provided today:  None  · Recommendations/Intent for next treatment session: Next visit will focus on progressive scapular/shoulder strengthening. Assess quality with increasing weight.     Total Treatment Billable Duration:  42  minutes  Time In: 1848  Time Out: [address] [facility] Session Pain  Charge Capture  [employer] Portal  MD Guidelines  Scanned Media  Benefits  MyChart

## 2022-06-03 ENCOUNTER — APPOINTMENT (OUTPATIENT)
Dept: PHYSICAL THERAPY | Age: 42
End: 2022-06-03
Attending: ORTHOPAEDIC SURGERY

## 2022-06-03 ENCOUNTER — HOSPITAL ENCOUNTER (OUTPATIENT)
Dept: PHYSICAL THERAPY | Age: 42
Setting detail: RECURRING SERIES
Discharge: HOME | End: 2022-06-06
Payer: MEDICARE

## 2022-06-03 PROCEDURE — 97110 THERAPEUTIC EXERCISES: CPT

## 2022-06-03 NOTE — PROGRESS NOTES
Guanako Fishman  : 1980  Primary: Medicare Part A And B  Secondary: [address] TITA Cazares @ [address] [address]  Phone: [phone]  Fax: [phone] Plan Frequency: 2 times a week    Plan of Care/Certification Expiration Date: 22      PT Visit Info: Total # of Visits to Date: 12      OUTPATIENT PHYSICAL THERAPY:OP NOTE TYPE: Treatment Note 6/3/2022     Appt Desk   Episode      Treatment Diagnosis:  Left shoulder pain (M25.212),left shoulder stiffness (M25.612)  Medical/Referring Diagnosis:   Referring Physician:  Nila Galeas MD MD Orders:  PT Eval and Treat , full motion full strength (22)  Date of Onset:  Onset Date: 22 (surgery)     Allergies:  Latex, Sulfa antibiotics, Sumatriptan, Amoxicillin, and Omeprazole  Restrictions/Precautions:  none  No data recordedNo data recorded   Interventions Planned (Treatment may consist of any combination of the following):   Electrical stimulation  Home exercise program  Manual therapy  neuromusculular re-education/strengthening  Range of motion (ROM)  Subjective Comments:pt reports soreness after the last visit but wanting to get stronger     Initial: 2  /10 Post Session: all good  Medications Last Reviewed:  6/3/2022 ibuprofen, gabapentin- PRN  Updated Objective Findings:  Tight flexion and end rang ER   Treatment   Therapeutic Exercise (43  Minutes): reviewed technique with side self supported midtrap holding 1# for home. Grade 4- to 4 physiological mobilizations left shoulder ER, IR, flexion and abduction.    Supine chest press 3# 3 x12  Serratus punch 3# 3x12  Shoulder flexion reclined holding yellow tband for deltoid 3x10  Shoulder abd, side 1#  2x 12  Shoulder ER, side 1# 2x12; supported scaption 1# 2x12  Shoulder IR supported scaption yellow tband 2x10;  Standing tband - HEP;   hand off behind the back 2x10  midtrap side 1# 2x8; prone 0# 2x8 with pt 3 sec hold at end range,guided at scapula  Low trap prone 0# 2x8 with 2 sec hold, guided at scapula  Row: prone 2# x10  Shoulder extn- HEP    Treatment/Session Summary:    · Treatment Assessment: good effort with increasing reps with exercises. Weak and stiff end range ER. Weak scapular stabilizers. Did not increase weight due to amount of soreness after last session     · Communication/Consultation:  None today  · Equipment provided today:  None  · Recommendations/Intent for next treatment session: Next visit will focus on progressive scapular/shoulder strengthening.  .    Total Treatment Billable Duration:  43 minutes  Time In: 8222  Time Out: [address], PT       Post Session Pain  Charge Capture  [employer] Portal  MD Guidelines  Scanned Media  Benefits  MyChart

## 2022-06-07 ENCOUNTER — HOSPITAL ENCOUNTER (OUTPATIENT)
Dept: PHYSICAL THERAPY | Age: 42
Setting detail: RECURRING SERIES
Discharge: HOME | End: 2022-06-10
Payer: MEDICARE

## 2022-06-07 PROCEDURE — 97110 THERAPEUTIC EXERCISES: CPT

## 2022-06-07 NOTE — PROGRESS NOTES
Solange Hook  : 1980  Primary: Medicare Part A And B  Secondary: [address] @ [address] [address]  [place] Al[place] [address]  Phone: [phone]  Fax: [phone] Plan Frequency: 2 times a week    Plan of Care/Certification Expiration Date: 22      PT Visit Info: Total # of Visits to Date: 16      OUTPATIENT PHYSICAL THERAPY:OP NOTE TYPE: Treatment Note 2022     Appt Desk   Episode      Treatment Diagnosis:  Left shoulder pain (M25.212),left shoulder stiffness (M25.612)  Medical/Referring Diagnosis:   Referring Physician:  Josseline Mchugh MD MD Orders:  PT Eval and Treat , full motion full strength (22)  Date of Onset:  Onset Date: 22 (surgery)     Allergies:  Latex, Sulfa antibiotics, Sumatriptan, Amoxicillin, and Omeprazole  Restrictions/Precautions:  none  No data recordedNo data recorded   Interventions Planned (Treatment may consist of any combination of the following):   Electrical stimulation  Home exercise program  Manual therapy  neuromusculular re-education/strengthening  Range of motion (ROM)  Subjective Comments:pt reports sleeping on the arm. Doing the HEP     Initial:   5   /10 Post Session:  I'm good  Medications Last Reviewed:  2022 ibuprofen, gabapentin- PRN  Updated Objective Findings:  Tight flexion and end rang ER   Treatment   Therapeutic Exercise ( 41 Minutes): reviewed side mid trap and side abd holding soup can. Grade 4- to 4 physiological mobilizations left shoulder ER, IR, flexion and abduction.    Supine chest press 3# 2 x15  Serratus punch 3# 3x15  Shoulder flexion reclined holding yellow tband for deltoid 1# 2x10  Shoulder abd, side reclined 1#  2  x 12  Shoulder ER, side 1# x15; supported scaption 1# 2x12  Shoulder IR  hand off behind the back 2x10  midtrap side 1# prone x10 d at scapula  Low trap prone 0# 2x8 with 2 sec hold, guided at scapula  Row: prone 2# x10  Shoulder ext: prone 2#

## 2022-06-09 ENCOUNTER — HOSPITAL ENCOUNTER (OUTPATIENT)
Dept: PHYSICAL THERAPY | Age: 42
Setting detail: RECURRING SERIES
Discharge: HOME | End: 2022-06-12
Payer: MEDICARE

## 2022-06-09 PROCEDURE — 97110 THERAPEUTIC EXERCISES: CPT

## 2022-06-09 ASSESSMENT — PAIN SCALES - GENERAL: PAINLEVEL_OUTOF10: 2

## 2022-06-09 NOTE — PROGRESS NOTES
Acey Gosselin  : 1980  Primary: Medicare Part A And B  Secondary: Michael Cazares @ [facility] [address] [address]  Phone: [phone]  Fax: [phone] Plan Frequency: 2 times a week    Plan of Care/Certification Expiration Date: 22      PT Visit Info: Total # of Visits to Date: 25      OUTPATIENT PHYSICAL THERAPY:OP NOTE TYPE: Treatment Note 2022     Appt Desk   Episode      Treatment Diagnosis:  Left shoulder pain (M25.212),left shoulder stiffness (M25.612)  Medical/Referring Diagnosis:   Referring Physician:  Anna Ernst MD MD Orders:  PT Eval and Treat , full motion full strength (22)  Date of Onset:  Onset Date: 22 (surgery)     Allergies:  Latex, Sulfa antibiotics, Sumatriptan, Amoxicillin, and Omeprazole  Restrictions/Precautions:  none  No data recordedNo data recorded   Interventions Planned (Treatment may consist of any combination of the following):   Electrical stimulation  Home exercise program  Manual therapy  neuromusculular re-education/strengthening  Range of motion (ROM)  Subjective Comments:  Pt reports mild pain. Initial:    2/10 Post Session:  2/10  Medications Last Reviewed:  2022 ibuprofen, gabapentin- PRN  Updated Objective Findings:  Tight flexion and end rang ER   Treatment   Therapeutic Exercise ( 45 Minutes): To increased strength and ROM. Pt required moderate cueing to perform exercises with correct body mechanics. Stretching to L shoulder into ER, IR, flexion and abduction. Pt was positioned in supine and the scapula was stabilized for all motions. Stretching was performed after the exercises.     Supine chest press 3# 3x10  Serratus punch 3# 3x10  Shoulder abd, side reclined 2#  3x10  Shoulder ER, side lying, 3# x10. 2# 2x10  midtrap side 1# prone 2x10 d at scapula  Low trap prone 0# 2x8 with 2 sec hold, guided at scapula  Row: prone 4# x10      Treatment/Session Summary:    Treatment Assessment: Pt states that she prefers to stretch at the beginning of treatment. Her flexion is improving but ER is still tight and limited. · Communication/Consultation:  none  · Equipment provided today:  None  · Recommendations/Intent for next treatment session: Next visit will focus on progressive scapular/shoulder strengthening.  .    Total Treatment Billable Duration:  45 minutes  Time In: 6950  Time Out: [address], PTA       Post Session Pain  Charge Capture  [employer] Portal  MD Guidelines  Scanned Media  Benefits  MyChart

## 2022-06-13 ENCOUNTER — HOSPITAL ENCOUNTER (OUTPATIENT)
Dept: PHYSICAL THERAPY | Age: 42
Setting detail: RECURRING SERIES
Discharge: HOME | End: 2022-06-16
Payer: MEDICARE

## 2022-06-13 PROCEDURE — 97110 THERAPEUTIC EXERCISES: CPT

## 2022-06-13 NOTE — PROGRESS NOTES
Lynnette Cloud  : 1980  Primary: Medicare Part A And B  Secondary: [address] @ [facility] [address]  Phone: [phone]  Fax: [phone] Plan Frequency: 2 times a week    Plan of Care/Certification Expiration Date: 22      PT Visit Info: Total # of Visits to Date: 23      OUTPATIENT PHYSICAL THERAPY:OP NOTE TYPE: Treatment Note 2022     Appt Desk   Episode      Treatment Diagnosis:  Left shoulder pain (M25.212),left shoulder stiffness (M25.612)  Medical/Referring Diagnosis:   Referring Physician:  Marga Cheng.MD [place] Orders:  PT Eval and Treat , full motion full strength (22)  Date of Onset:  Onset Date: 22 (surgery)     Allergies:  Latex, Sulfa antibiotics, Sumatriptan, Amoxicillin, and Omeprazole  Restrictions/Precautions:  none  No data recordedNo data recorded   Interventions Planned (Treatment may consist of any combination of the following):   Electrical stimulation  Home exercise program  Manual therapy  neuromusculular re-education/strengthening  Range of motion (ROM)  Subjective Comments:    did the HEP. Sleeping on it so it's sore. Stretching helps  Initial: 3    /10 Post Session:  -6/10  Medications Last Reviewed:  2022 ibuprofen, gabapentin- PRN  Updated Objective Findings:  Good posture and placement of scapula  Treatment   Therapeutic Exercise (   40  Minutes): To increased strength and ROM. Pt required minimal cueing for technique. Grade 4- to 4 physiological mobilizations to L shoulder into ER, IR, flexion and abduction.     Flexion: supine chest press 4# 2x10; reclined 2# yellow tband 2x10  Supine serratus punch 4 # 2x10; dynamic hug red 2x10   Shoulder abd, side reclined 2# 2x10  Shoulder ER: side lying 3# 2x10; seated scaption 2#   Midtrap: 2# prone x10, place and hold 3 sec 2x10   Low trap: prone 2# x10; 0# 2x8 place and 2 sec hold  Row: propped 4# 2x10  IR: hand off behind the back to right with gentle stretch x10, to left x10      Treatment/Session Summary:    Treatment Assessment: Good tolerance to exercises. Weak scapular stabilizers    · Communication/Consultation:  none  · Equipment provided today:  None  · Recommendations/Intent for next treatment session: Next visit will focus on progressive scapular/shoulder strengthening. Pt to perform prone mid and low trap 3 sec hold .     Total Treatment Billable Duration:  40 minutes  Time In: 3063  Time Out: [employer] [facility]  MD Guidelines  Scanned Media  Benefits  MyChart

## 2022-06-15 ENCOUNTER — HOSPITAL ENCOUNTER (OUTPATIENT)
Dept: PHYSICAL THERAPY | Age: 42
Setting detail: RECURRING SERIES
Discharge: HOME | End: 2022-06-18
Payer: MEDICARE

## 2022-06-15 PROCEDURE — 97110 THERAPEUTIC EXERCISES: CPT

## 2022-06-15 NOTE — PROGRESS NOTES
Sandy Grubbs  : 1980  Primary: Medicare Part A And B  Secondary: [address] @ [address] [address]  Phone: [phone]  Fax: [phone] Plan Frequency: 2 times a week    Plan of Care/Certification Expiration Date: 22      PT Visit Info: Total # of Visits to Date: 21      OUTPATIENT PHYSICAL THERAPY:OP NOTE TYPE: Progress Report 6/15/2022               Episode  Appt Desk         Treatment Diagnosis:  left shoulder pain (M25. 212) left shoulder stiffness (M25. 612)  Medical/Referring Diagnosis:  Encounter for other specified surgical aftercare [Z48.89]  Referring Physician:  Angie Ariza MD MD Orders:  PT Eval and Treat   Return MD Appt:  unsure  Date of Onset:  Onset Date: 22 (surgery)     Allergies:  Latex, Sulfa antibiotics, Sumatriptan, Amoxicillin, and Omeprazole  Restrictions/Precautions:    No data recordedNo data recorded   Medications Last Reviewed:  6/15/2022     SUBJECTIVE   subjective:  Pt reports being pleased with progress. Less pain. Still have brief moments of tingling along the upper arm with over stretching. Goes away with rest  Patient Stated Goal(s):  \"use the arm again\"  Initial:1      /10 Post Session:  0-1    /10  Past Medical History/Comorbidities:   Ms. Leland Corral  has a past medical history of Anxiety, Anxiety, generalized, Asthma, Chronic back pain, Depression, Migraine, Occasional tremors, Postpartum depression, and Sickle cell disease ([facility] [address] Ms. Leland Corral  has a past surgical history that includes  delivery only; pelvic laparoscopy; Anterior cruciate ligament repair (Right); sinus surgery proc unlisted; and Cholecystectomy. Social History/Living Environment:   Lives with children.  Cares for home, cleaning, shopping and cooking  Prior Level of Function/Work/Activity:   Independent with all ADL's  Learning:   No barriers  Fall Risk Scale: 0        OBJECTIVE   Observation: good posture correction and scapular placement  ROM:  Left shoulder Flexion: 157 deg                        ABD: 100 deg                        ER: 68 deg at 40 deg abd, 55 deg at 90 deg abd                        IR: to T12  Strength: grossly 4/5 in the shoulder except midtrap and lower 4-/5    ASSESSMENT   Assessment:  Pt has made good progress in improving AROM with less compensation. Endurance remains limited with fatigue at end of treatment session. ER ROM will improve as the muscles gain strength at end ROM. Scapular stabilizer remain weak  Problem List: (Impacting functional limitations):      weakness, stiffness  PLAN   Effective Dates: 4/8/22 TO Plan of Care/Certification Expiration Date: 07/07/22     Frequency/Duration: Plan Frequency: 2 times a week     Interventions Planned (Treatment may consist of any combination of the following):    Current Treatment Recommendations: Strengthening; ROM; Endurance training; Neuromuscular re-education; Manual Therapy - Joint Manipulation; Manual Therapy - Soft Tissue Mobilization; Pain management; Home exercise program; Modalities     Goals: (Goals have been discussed and agreed upon with patient.)  Short-Term Functional Goals: Time Frame: 4 weeks     1. Independent with HEP-being met     2. Left shoulder PROM flexion to 120 deg, IR to sacral border to allow progression to independent ADL's without compensation- met       Discharge Goals: Time Frame: 90 days     1. Tolerate exercise > 45 minutes to prepare for home and - being met     2. ADL's with pain < 3/10-  met     3. LUE Strength 4+ to 5/5 to allow safe reaching and lifting- progressing, not fully  met     4. Decrease DASH score by at least 25 points to demonstrate improved function- progressing            Outcome Measure:    Tool Used: Disabilities of the Arm, Shoulder and Hand (DASH) Questionnaire - Quick Version  Score:  Initial: 53/55  Most Recent: 37/55 (Date:5/13/22)  25/55 (date 6/15/22)   Interpretation of Score: The DASH is designed to measure the activities of daily living in person's with upper extremity dysfunction or pain. Each section is scored on a 1-5 scale, 5 representing the greatest disability. The scores of each section are added together for a total score of 55.     Total Duration:  Time In: 1413  Time Out: 3 Emanuel Montesinos PT             Post Session Pain  Charge Capture  PT Visit Info  POC Link  Treatment Note Link  MD Guidelines  [place]

## 2022-06-15 NOTE — PROGRESS NOTES
Lynnette Mckeon  : 1980  Primary: Medicare Part A And B  Secondary: [address] Cazares @ [address] [address]  Phone: [phone]  Fax: [phone] Plan Frequency: 2 times a week    Plan of Care/Certification Expiration Date: 22      PT Visit Info: Total # of Visits to Date: 21      OUTPATIENT PHYSICAL THERAPY:OP NOTE TYPE: Treatment Note 6/15/2022     Appt Desk   Episode      Treatment Diagnosis:  Left shoulder pain (M25.212),left shoulder stiffness (M25.612)  Medical/Referring Diagnosis:   Referring Physician:  Marcia Juan MD MD Orders:  PT Eval and Treat , full motion full strength (22)  Date of Onset:  Onset Date: 22 (surgery)     Allergies:  Latex, Sulfa antibiotics, Sumatriptan, Amoxicillin, and Omeprazole  Restrictions/Precautions:  none  No data recordedNo data recorded   Interventions Planned (Treatment may consist of any combination of the following):   Electrical stimulation  Home exercise program  Manual therapy  neuromusculular re-education/strengthening  Range of motion (ROM)  Subjective Comments:    did the HEP. Occasional brief. tingle in the upper arm with over stretching it  Initial: 1  /10 Post Session:    /10  Medications Last Reviewed:  6/15/2022 ibuprofen, gabapentin- PRN  Updated Objective Findings:  Good posture and placement of scapula  Treatment   Therapeutic Exercise (   42  Minutes): To increased strength and ROM quality. Pt required occasional minimal cueing for technique at scapula Grade 4- to 4 physiological mobilizations to L shoulder into ER, IR, flexion and abduction.     Flexion: supine chest press 4# 2x10; reclined 2# yellow tband 2x12  Supine serratus punch 4 # 2x10; dynamic hug red 2x12   Shoulder abd, side reclined 2# x12 x10; standing B ant/mid deltoid 2x5  Shoulder ER: side lying 3# x12 x10; seated scaption 2# 2x12  Midtrap: 2# prone x10, 1# x10 place and hold 3 sec x5   Low trap: prone 2# x10; 1# x8 place and 2 sec hold  Row: propped 4# 2x10  IR: hand off behind the back to right with gentle stretch x10, to left x10  Triceps: 7# cable 2x10      Treatment/Session Summary:    Treatment Assessment: Good tolerance to increasing weight or reps with exercises. Weak scapular stabilizers. Improving strength    · Communication/Consultation:  none  · Equipment provided today:  None  · Recommendations/Intent for next treatment session: Next visit will focus on progressive scapular/shoulder strengthening.      Total Treatment Billable Duration:  42 minutes  Time In: 9608  Time Out: Ronni Muñiz MD Guidelines  Scanned Media  Benefits  MyChart

## 2022-06-20 ENCOUNTER — HOSPITAL ENCOUNTER (OUTPATIENT)
Dept: PHYSICAL THERAPY | Age: 42
Setting detail: RECURRING SERIES
Discharge: HOME | End: 2022-06-23
Payer: MEDICARE

## 2022-06-20 PROCEDURE — 97110 THERAPEUTIC EXERCISES: CPT

## 2022-06-20 NOTE — PROGRESS NOTES
Sharif Fields  : 1980  Primary: Medicare Part A And B  Secondary: Michael Cazares @ [facility]0 [address] [address]  Phone: [phone]  Fax: [phone] Plan Frequency: 2 times a week    Plan of Care/Certification Expiration Date: 22      PT Visit Info: Total # of Visits to Date: 24      OUTPATIENT PHYSICAL THERAPY:OP NOTE TYPE: Treatment Note 2022     Appt Desk   Episode      Treatment Diagnosis:  Left shoulder pain (M25.212),left shoulder stiffness (M25.612)  Medical/Referring Diagnosis:   Referring Physician:  Marty Jeffers.MD [place] Orders:  PT Eval and Treat , full motion full strength (22)  Date of Onset:  Onset Date: 22 (surgery)     Allergies:  Latex, Sulfa antibiotics, Sumatriptan, Amoxicillin, and Omeprazole  Restrictions/Precautions:  none  No data recordedNo data recorded   Interventions Planned (Treatment may consist of any combination of the following):   Electrical stimulation  Home exercise program  Manual therapy  neuromusculular re-education/strengthening  Range of motion (ROM)  Subjective Comments:    Did the HEP. Shoulder just feels stiff  Initial: 1  /10 Post Session: it's ok  Medications Last Reviewed:  2022 ibuprofen, gabapentin- PRN  Updated Objective Findings:  Good posture and placement of scapula  Treatment   Therapeutic Exercise (   40  Minutes): To increased strength and ROM quality. Pt required occasional minimal cueing for technique. Grade 4- to 4 physiological mobilizations to L shoulder into ER, IR, flexion and abduction.     Flexion: supine chest press 4# x10; reclined 3#  x10  Supine serratus punch 4 # 2x10; dynamic hug red 2x12   Shoulder abd, side reclined 2# x12; standing B ant/mid deltoid x8  Shoulder ER: side lying 3# x12; seated scaption 2# 2x15  Midtrap: side 1#  2x8  Low trap: prone 2# x10  Row: prone 4# 2x10  IR: propped lift off x8  Triceps: 7# cable 2x10      Treatment/Session Summary:    Treatment Assessment: Good tolerance to increasing weight or reps with exercises. Improving scapular strength and motion quality    · Communication/Consultation:  none  · Equipment provided today:  None  · Recommendations/Intent for next treatment session: Next visit will focus on progressive scapular/shoulder strengthening.      Total Treatment Billable Duration:  40 minutes  Time In: 0682  Time Out: [address], PT       Post Session Pain  Charge Capture  [employer] Portal  MD Guidelines  Scanned Media  Benefits  MyChart

## 2022-06-21 ENCOUNTER — OFFICE VISIT (OUTPATIENT)
Dept: ORTHOPEDIC SURGERY | Age: 42
End: 2022-06-21
Payer: MEDICARE

## 2022-06-21 DIAGNOSIS — Z47.89 ORTHOPEDIC AFTERCARE: Primary | ICD-10-CM

## 2022-06-21 PROCEDURE — G8427 DOCREV CUR MEDS BY ELIG CLIN: HCPCS | Performed by: ORTHOPAEDIC SURGERY

## 2022-06-21 PROCEDURE — 99213 OFFICE O/P EST LOW 20 MIN: CPT | Performed by: ORTHOPAEDIC SURGERY

## 2022-06-21 PROCEDURE — 1036F TOBACCO NON-USER: CPT | Performed by: ORTHOPAEDIC SURGERY

## 2022-06-21 PROCEDURE — G8419 CALC BMI OUT NRM PARAM NOF/U: HCPCS | Performed by: ORTHOPAEDIC SURGERY
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
## 2022-06-22 ENCOUNTER — HOSPITAL ENCOUNTER (OUTPATIENT)
Dept: PHYSICAL THERAPY | Age: 42
Setting detail: RECURRING SERIES
End: 2022-06-22
Payer: MEDICARE

## 2022-06-28 ENCOUNTER — APPOINTMENT (OUTPATIENT)
Dept: PHYSICAL THERAPY | Age: 42
End: 2022-06-28
Payer: MEDICARE

## 2022-08-25 ENCOUNTER — APPOINTMENT (RX ONLY)
Dept: URBAN - NONMETROPOLITAN AREA CLINIC 1 | Facility: CLINIC | Age: 42
Setting detail: DERMATOLOGY
End: 2022-08-25

## 2022-08-25 DIAGNOSIS — L43.8 OTHER LICHEN PLANUS: ICD-10-CM | Status: INADEQUATELY CONTROLLED

## 2022-08-25 PROCEDURE — ? ADDITIONAL NOTES

## 2022-08-25 PROCEDURE — ? FULL BODY SKIN EXAM - DECLINED

## 2022-08-25 PROCEDURE — ? PRESCRIPTION MEDICATION MANAGEMENT

## 2022-08-25 PROCEDURE — ? COUNSELING

## 2022-08-25 PROCEDURE — ? EDUCATIONAL RESOURCES PROVIDED

## 2022-08-25 PROCEDURE — 99204 OFFICE O/P NEW MOD 45 MIN: CPT

## 2022-08-25 PROCEDURE — ? PRESCRIPTION

## 2022-08-25 RX ORDER — CLOBETASOL PROPIONATE 0.5 MG/ML
1 SOLUTION TOPICAL BID
Qty: 50 | Refills: 1 | Status: ERX | COMMUNITY
Start: 2022-08-25

## 2022-08-25 RX ORDER — HYDROXYZINE HYDROCHLORIDE 25 MG/1
1 TABLET, FILM COATED ORAL QHS
Qty: 30 | Refills: 0 | Status: ERX | COMMUNITY
Start: 2022-08-25

## 2022-08-25 RX ADMIN — CLOBETASOL PROPIONATE 1: 0.5 SOLUTION TOPICAL at 00:00

## 2022-08-25 RX ADMIN — HYDROXYZINE HYDROCHLORIDE 1: 25 TABLET, FILM COATED ORAL at 00:00

## 2022-08-25 ASSESSMENT — LOCATION ZONE DERM
LOCATION ZONE: ARM
LOCATION ZONE: MUCOUS_MEMBRANE
LOCATION ZONE: LEG

## 2022-08-25 ASSESSMENT — LOCATION SIMPLE DESCRIPTION DERM
LOCATION SIMPLE: RIGHT PRETIBIAL REGION
LOCATION SIMPLE: LEFT THIGH
LOCATION SIMPLE: LEFT PRETIBIAL REGION
LOCATION SIMPLE: LEFT FOREARM
LOCATION SIMPLE: RIGHT INFERIOR GINGIVAE
LOCATION SIMPLE: RIGHT THIGH
LOCATION SIMPLE: RIGHT KNEE
LOCATION SIMPLE: LEFT INFERIOR GINGIVAE
LOCATION SIMPLE: RIGHT FOREARM

## 2022-08-25 ASSESSMENT — LOCATION DETAILED DESCRIPTION DERM
LOCATION DETAILED: RIGHT INFERIOR GINGIVAE
LOCATION DETAILED: RIGHT VENTRAL DISTAL FOREARM
LOCATION DETAILED: LEFT ANTERIOR PROXIMAL THIGH
LOCATION DETAILED: LEFT VENTRAL LATERAL DISTAL FOREARM
LOCATION DETAILED: LEFT DISTAL PRETIBIAL REGION
LOCATION DETAILED: RIGHT DISTAL PRETIBIAL REGION
LOCATION DETAILED: LEFT INFERIOR GINGIVAE
LOCATION DETAILED: RIGHT ANTERIOR DISTAL THIGH
LOCATION DETAILED: RIGHT KNEE

## 2022-08-25 NOTE — PROCEDURE: MIPS QUALITY
Quality 226: Preventive Care And Screening: Tobacco Use: Screening And Cessation Intervention: Tobacco Screening not Performed for Medical Reasons
Quality 130: Documentation Of Current Medications In The Medical Record: Current Medications Documented
Quality 110: Preventive Care And Screening: Influenza Immunization: Influenza Immunization previously received during influenza season
Quality 431: Preventive Care And Screening: Unhealthy Alcohol Use - Screening: Patient did not receive brief counseling if identified as an unhealthy alcohol user, reason not given
Detail Level: Detailed

## 2022-08-25 NOTE — HPI: RASH
Is The Patient Presenting As Previously Scheduled?: Yes
How Severe Is Your Rash?: mild
Is This A New Presentation, Or A Follow-Up?: Rash
Additional History: Pt is here for a rash. She states it started on her legs and now has spread up to her thighs and arms. She states the rash is bumpy and itchy. She has tried Benadryl lotion and triamcinolone ointment but the ointment burned when she applied it.\\n\\nShe had a steroid shot from the [facility] around the first of august.

## 2022-08-25 NOTE — PROCEDURE: ADDITIONAL NOTES
Render Risk Assessment In Note?: no
Detail Level: Simple
Additional Notes: Oral condition is so mild that treatment is not necessary

## 2022-08-25 NOTE — PROCEDURE: PRESCRIPTION MEDICATION MANAGEMENT
Initiate Treatment: Clobetasol 0.05% solution mixed in jar of anti-itch Cerave lotion\\nHydroxyzine 25mg QHS
Plan: Advised the patient to visit PCP for potential underlying conditions.\\nRTC in 3 weeks consider biopsy if not improved.
Detail Level: Zone
Continue Regimen: Claritin \\nZyrtec
Render In Strict Bullet Format?: No

## 2022-09-14 ENCOUNTER — HOSPITAL ENCOUNTER (OUTPATIENT)
Dept: LAB | Age: 42
Discharge: HOME | End: 2022-09-17

## 2022-09-14 PROCEDURE — 88312 SPECIAL STAINS GROUP 1: CPT

## 2022-09-14 PROCEDURE — 88305 TISSUE EXAM BY PATHOLOGIST: CPT

## 2022-09-15 ENCOUNTER — APPOINTMENT (RX ONLY)
Dept: URBAN - NONMETROPOLITAN AREA CLINIC 1 | Facility: CLINIC | Age: 42
Setting detail: DERMATOLOGY
End: 2022-09-15

## 2022-09-15 DIAGNOSIS — L43.8 OTHER LICHEN PLANUS: ICD-10-CM | Status: INADEQUATELY CONTROLLED

## 2022-09-15 PROBLEM — L30.9 DERMATITIS, UNSPECIFIED: Status: ACTIVE | Noted: 2022-09-15

## 2022-09-15 PROCEDURE — ? PRESCRIPTION

## 2022-09-15 PROCEDURE — 99214 OFFICE O/P EST MOD 30 MIN: CPT | Mod: 25

## 2022-09-15 PROCEDURE — ? PRESCRIPTION MEDICATION MANAGEMENT

## 2022-09-15 PROCEDURE — 11104 PUNCH BX SKIN SINGLE LESION: CPT

## 2022-09-15 PROCEDURE — ? BIOPSY BY PUNCH METHOD

## 2022-09-15 PROCEDURE — ? FULL BODY SKIN EXAM - DECLINED

## 2022-09-15 PROCEDURE — ? COUNSELING

## 2022-09-15 RX ORDER — CLOBETASOL PROPIONATE 0.25 MG/G
1 CREAM TOPICAL BID
Qty: 100 | Refills: 1 | Status: ERX | COMMUNITY
Start: 2022-09-15

## 2022-09-15 RX ADMIN — CLOBETASOL PROPIONATE 1: 0.25 CREAM TOPICAL at 00:00

## 2022-09-15 ASSESSMENT — LOCATION ZONE DERM
LOCATION ZONE: ARM
LOCATION ZONE: LEG

## 2022-09-15 ASSESSMENT — LOCATION SIMPLE DESCRIPTION DERM
LOCATION SIMPLE: RIGHT THIGH
LOCATION SIMPLE: LEFT PRETIBIAL REGION
LOCATION SIMPLE: LEFT THIGH
LOCATION SIMPLE: RIGHT PRETIBIAL REGION
LOCATION SIMPLE: RIGHT KNEE
LOCATION SIMPLE: LEFT FOREARM
LOCATION SIMPLE: RIGHT FOREARM

## 2022-09-15 ASSESSMENT — LOCATION DETAILED DESCRIPTION DERM
LOCATION DETAILED: LEFT ANTERIOR PROXIMAL THIGH
LOCATION DETAILED: RIGHT ANTERIOR DISTAL THIGH
LOCATION DETAILED: LEFT VENTRAL LATERAL DISTAL FOREARM
LOCATION DETAILED: RIGHT VENTRAL DISTAL FOREARM
LOCATION DETAILED: RIGHT KNEE
LOCATION DETAILED: LEFT DISTAL PRETIBIAL REGION
LOCATION DETAILED: RIGHT DISTAL PRETIBIAL REGION
LOCATION DETAILED: LEFT VENTRAL DISTAL FOREARM

## 2022-09-15 NOTE — PROCEDURE: BIOPSY BY PUNCH METHOD
Detail Level: Detailed
Was A Bandage Applied: Yes
Punch Size In Mm: 2
Biopsy Type: H and E
Anesthesia Type: 1% lidocaine with epinephrine and a 1:10 solution of 8.4% sodium bicarbonate
Anesthesia Volume In Cc (Will Not Render If 0): 0.5
Additional Anesthesia Volume In Cc (Will Not Render If 0): 0
Hemostasis: None
Epidermal Sutures: none, closed by secondary intention
Wound Care: Petrolatum
Dressing: bandage
Patient Will Remove Sutures At Home?: No
Lab: -97
Lab Facility: [id number]
Path Notes (To The Dermatopathologist): Skin path
Consent: Written consent was obtained and risks were reviewed including but not limited to scarring, infection, bleeding, scabbing, incomplete removal, nerve damage and allergy to anesthesia.
Post-Care Instructions: I reviewed with the patient in detail post-care instructions. Patient is to keep the biopsy site dry overnight, and then apply bacitracin twice daily until healed. Patient may apply hydrogen peroxide soaks to remove any crusting.
Home Suture Removal Text: Patient was provided a home suture removal kit and will remove their sutures at home.  If they have any questions or difficulties they will call the office.
Notification Instructions: Patient will be notified of biopsy results. However, patient instructed to call the office if not contacted within 2 weeks.
Billing Type: Third-Party Bill
Information: Selecting Yes will display possible errors in your note based on the variables you have selected. This validation is only offered as a suggestion for you. PLEASE NOTE THAT THE VALIDATION TEXT WILL BE REMOVED WHEN YOU FINALIZE YOUR NOTE. IF YOU WANT TO FAX A PRELIMINARY NOTE YOU WILL NEED TO TOGGLE THIS TO 'NO' IF YOU DO NOT WANT IT IN YOUR FAXED NOTE.

## 2022-09-20 ENCOUNTER — HOSPITAL ENCOUNTER (EMERGENCY)
Age: 42
Discharge: HOME | End: 2022-09-20
Attending: EMERGENCY MEDICINE
Payer: MEDICARE

## 2022-09-20 VITALS
SYSTOLIC BLOOD PRESSURE: 124 MMHG | OXYGEN SATURATION: 98 % | DIASTOLIC BLOOD PRESSURE: 72 MMHG | TEMPERATURE: 99 F | HEART RATE: 68 BPM | BODY MASS INDEX: 27.64 KG/M2 | RESPIRATION RATE: 16 BRPM | HEIGHT: 63 IN | WEIGHT: 156 LBS

## 2022-09-20 DIAGNOSIS — N76.0 ACUTE VAGINITIS: Primary | ICD-10-CM

## 2022-09-20 LAB
BACTERIA URNS QL MICRO: ABNORMAL /HPF
CASTS URNS QL MICRO: ABNORMAL /LPF
EPI CELLS #/AREA URNS HPF: ABNORMAL /HPF
HCG UR QL: NEGATIVE
HCG, URINE, POC: NEGATIVE
Lab: NORMAL
NEGATIVE QC PASS/FAIL: NORMAL
POSITIVE QC PASS/FAIL: NORMAL
RBC #/AREA URNS HPF: ABNORMAL /HPF
SERVICE CMNT-IMP: NORMAL
WBC URNS QL MICRO: ABNORMAL /HPF
WET PREP GENITAL: NORMAL

## 2022-09-20 PROCEDURE — 81015 MICROSCOPIC EXAM OF URINE: CPT

## 2022-09-20 PROCEDURE — 87491 CHLMYD TRACH DNA AMP PROBE: CPT

## 2022-09-20 PROCEDURE — 6360000002 HC RX W HCPCS: Performed by: PHYSICIAN ASSISTANT

## 2022-09-20 PROCEDURE — 81025 URINE PREGNANCY TEST: CPT

## 2022-09-20 PROCEDURE — 87210 SMEAR WET MOUNT SALINE/INK: CPT

## 2022-09-20 PROCEDURE — 2500000003 HC RX 250 WO HCPCS: Performed by: PHYSICIAN ASSISTANT

## 2022-09-20 PROCEDURE — 96372 THER/PROPH/DIAG INJ SC/IM: CPT

## 2022-09-20 PROCEDURE — 99284 EMERGENCY DEPT VISIT MOD MDM: CPT

## 2022-09-20 RX ORDER — DOXYCYCLINE HYCLATE 100 MG
100 TABLET ORAL 2 TIMES DAILY
Qty: 14 TABLET | Refills: 0 | Status: SHIPPED | OUTPATIENT
Start: 2022-09-20 | End: 2022-09-27

## 2022-09-20 RX ADMIN — CEFTRIAXONE SODIUM 500 MG: 500 INJECTION, POWDER, FOR SOLUTION INTRAMUSCULAR; INTRAVENOUS at 10:05

## 2022-09-20 ASSESSMENT — PAIN DESCRIPTION - PAIN TYPE: TYPE: ACUTE PAIN

## 2022-09-20 ASSESSMENT — PAIN DESCRIPTION - DESCRIPTORS: DESCRIPTORS: BURNING

## 2022-09-20 ASSESSMENT — PAIN DESCRIPTION - LOCATION: LOCATION: VAGINA

## 2022-09-20 ASSESSMENT — PAIN SCALES - GENERAL: PAINLEVEL_OUTOF10: 2

## 2022-09-20 ASSESSMENT — PAIN - FUNCTIONAL ASSESSMENT: PAIN_FUNCTIONAL_ASSESSMENT: 0-10

## 2022-09-20 NOTE — ED NOTES
I have reviewed discharge instructions with the patient. The patient verbalized understanding. Patient left ED via Discharge Method: ambulatory to Home with self. Opportunity for questions and clarification provided. Patient given 1 scripts. To continue your aftercare when you leave the hospital, you may receive an automated call from our care team to check in on how you are doing. This is a free service and part of our promise to provide the best care and service to meet your aftercare needs.  If you have questions, or wish to unsubscribe from this service please call [phone]. Thank you for Choosing our [facility] Emergency Department.         Taylor Flower RN  09/20/22 [id number]

## 2022-09-20 NOTE — ED PROVIDER NOTES
[facility] Emergency Department Provider Note                   PCP:                Terese Ragsdale MD               Age: 38 y.o. Sex: female       ICD-10-CM    1. Acute vaginitis  N76.0           DISPOSITION Decision To Discharge 2022 10:27:27 AM       New Prescriptions    DOXYCYCLINE HYCLATE (VIBRA-TABS) 100 MG TABLET    Take 1 tablet by mouth 2 times daily for 7 days       Orders Placed This Encounter   Procedures    Wet Prep    C.trachomatis N.gonorrhoeae DNA    Urinalysis, Micro    POCT Urine Dipstick    PELVIC EXAM SET UP    POC Urine Pregnancy (Select for females age 6-55)    POC Pregnancy Urine Qual         Deepa Oliver is a 38 y.o. female who presents to the Emergency Department with chief complaint of    Chief Complaint   Patient presents with    Vaginal Discharge      Patient to ER complaining of about 1-1/2-week history of clear sometimes yellow vaginal discharge. She denies any difficulty urinating. She denies any nausea or vomiting, no diarrhea or constipation. She is concerned for STD    Past Medical History:  No date: Anxiety  No date: Anxiety, generalized  No date: Asthma      Comment:  Albuterol inhaler prn  No date: Chronic back pain  No date: Depression  No date: Migraine  No date: Occasional tremors  No date: Postpartum depression  No date: Sickle cell disease (HCC)      Comment:  sickle cell trait      Past Surgical History:  No date: ANTERIOR CRUCIATE LIGAMENT REPAIR; Right  No date:  DELIVERY ONLY      Comment:  first pregnancy  No date: CHOLECYSTECTOMY  No date: PELVIC LAPAROSCOPY      Comment:  laparoscopy for endometriosis  No date: SINUS SURGERY PROC UNLISTED         Review of Systems   All other systems reviewed and are negative. All other systems reviewed and are negative.       Past Medical History:   Diagnosis Date    Anxiety     Anxiety, generalized     Asthma     Albuterol inhaler prn    Chronic back pain     Depression     Migraine     Occasional tremors Postpartum depression     Sickle cell disease (HCC)     sickle cell trait        Past Surgical History:   Procedure Laterality Date    ANTERIOR CRUCIATE LIGAMENT REPAIR Right      DELIVERY ONLY      first pregnancy    CHOLECYSTECTOMY      PELVIC LAPAROSCOPY      laparoscopy for endometriosis    SINUS SURGERY PROC UNLISTED          Family History   Problem Relation Age of Onset    Cancer Paternal Grandfather     Lung Disease Paternal Grandmother     Lung Disease Maternal Grandfather     Cancer Maternal Grandfather     Lung Disease Paternal Grandfather     Osteoarthritis Mother     Lung Disease Maternal Grandmother     Diabetes Mother     Hypertension Mother     Psychiatric Disorder Mother     Hypertension Father     Asthma Brother     Asthma Mother         Social Connections: Not on file        Allergies   Allergen Reactions    Latex Rash    Sulfa Antibiotics Shortness Of Breath    Sumatriptan Shortness Of Breath     Other reaction(s): flushing, sweating, Wheezing and/or shortness of breathe-Allergy    Amoxicillin Hives    Omeprazole Itching     Tongue and throat    Omeprazole Magnesium Swelling     Other reaction(s): Lips/Mouth swelling-Allergy        Vitals signs and nursing note reviewed. Patient Vitals for the past 4 hrs:   Temp Pulse Resp BP SpO2   22 0905 99 °F (37.2 °C) 73 16 139/87 100 %          Physical Exam  Vitals and nursing note reviewed. Constitutional:       Appearance: Normal appearance. She is normal weight. HENT:      Head: Normocephalic and atraumatic. Right Ear: External ear normal.      Left Ear: External ear normal.      Nose: Nose normal.      Mouth/Throat:      Mouth: Mucous membranes are moist.      Pharynx: Oropharynx is clear. Eyes:      Extraocular Movements: Extraocular movements intact. Pupils: Pupils are equal, round, and reactive to light. Cardiovascular:      Rate and Rhythm: Normal rate and regular rhythm. Pulses: Normal pulses.       Heart sounds: Normal heart sounds. Pulmonary:      Effort: Pulmonary effort is normal.      Breath sounds: Normal breath sounds. No rales. Chest:      Chest wall: No tenderness. Abdominal:      General: Abdomen is flat. Palpations: Abdomen is soft. Tenderness: There is no abdominal tenderness. Hernia: No hernia is present. Genitourinary:     Comments: Normal external genitalia. Patient has small amount of thin white discharge in the vault. Cervix is closed no cervical motion tenderness no adnexal pain or masses appreciated. Wet prep and STD cultures collected  Musculoskeletal:         General: Normal range of motion. Cervical back: Normal range of motion and neck supple. Skin:     General: Skin is warm and dry. Neurological:      General: No focal deficit present. Mental Status: She is alert.    Psychiatric:         Mood and Affect: Mood normal.         Behavior: Behavior normal.        MDM  Number of Diagnoses or Management Options  Acute vaginitis  Diagnosis management comments: Labs Reviewed  URINALYSIS, MICRO - Abnormal; Notable for the following components:     BACTERIA, URINE               1+ (*)              All other components within normal limits  WET PREP, GENITAL  C.TRACHOMATIS N.GONORRHOEAE DNA  POC PREGNANCY UR-QUAL  POC PREGNANCY UR-QUAL     Vaginitis, STD treatment given in ER will call with any positive test results stressed to avoid any unprotected sex       Amount and/or Complexity of Data Reviewed  Clinical lab tests: ordered and reviewed  Review and summarize past medical records: yes    Risk of Complications, Morbidity, and/or Mortality  Presenting problems: moderate  Diagnostic procedures: moderate  Management options: low    Patient Progress  Patient progress: improved      Procedures    Labs Reviewed   URINALYSIS, MICRO - Abnormal; Notable for the following components:       Result Value    BACTERIA, URINE 1+ (*)     All other components within normal limits WET PREP, GENITAL   C.TRACHOMATIS N.GONORRHOEAE DNA   POC PREGNANCY UR-QUAL   POC PREGNANCY UR-QUAL        No orders to display            Vincent Coma Scale  Eye Opening: Spontaneous  Best Verbal Response: Oriented  Best Motor Response: Obeys commands  Vincent Coma Scale Score: 15                     Voice dictation software was used during the making of this note. This software is not perfect and grammatical and other typographical errors may be present. This note has not been completely proofread for errors.      TERESSA Quintanilla  09/20/22 [address] [facility] [place], [place]  09/21/22 [id number]

## 2022-09-20 NOTE — DISCHARGE INSTRUCTIONS
Use meds  as directed. We will call with any positive culture results. Avoid any unprotected sex.   You may also check \"MyChart\" for test results

## 2022-09-20 NOTE — ED TRIAGE NOTES
Pt c/o 1wk white to clear vaginal discharge, burning, odor (+)burning w urination (-)vaginal bleeding. (+)unprotected sex w male partner  A&Ox4

## 2022-11-27 ENCOUNTER — HOSPITAL ENCOUNTER (EMERGENCY)
Age: 42
Discharge: HOME | End: 2022-11-27
Attending: EMERGENCY MEDICINE | Admitting: EMERGENCY MEDICINE
Payer: MEDICARE

## 2022-11-27 VITALS
BODY MASS INDEX: 27.64 KG/M2 | WEIGHT: 156 LBS | DIASTOLIC BLOOD PRESSURE: 80 MMHG | TEMPERATURE: 99.6 F | HEIGHT: 63 IN | HEART RATE: 81 BPM | OXYGEN SATURATION: 100 % | RESPIRATION RATE: 18 BRPM | SYSTOLIC BLOOD PRESSURE: 125 MMHG

## 2022-11-27 DIAGNOSIS — J10.1 INFLUENZA A: Primary | ICD-10-CM

## 2022-11-27 LAB
FLUAV AG NPH QL IA: POSITIVE
FLUBV AG NPH QL IA: NEGATIVE
SARS-COV-2 RDRP RESP QL NAA+PROBE: NOT DETECTED
SOURCE: NORMAL
SPECIMEN SOURCE: ABNORMAL

## 2022-11-27 PROCEDURE — 87635 SARS-COV-2 COVID-19 AMP PRB: CPT

## 2022-11-27 PROCEDURE — 87804 INFLUENZA ASSAY W/OPTIC: CPT

## 2022-11-27 PROCEDURE — 99283 EMERGENCY DEPT VISIT LOW MDM: CPT

## 2022-11-27 ASSESSMENT — ENCOUNTER SYMPTOMS
ABDOMINAL PAIN: 0
COUGH: 1
SHORTNESS OF BREATH: 1
SORE THROAT: 1

## 2022-11-27 ASSESSMENT — PAIN - FUNCTIONAL ASSESSMENT: PAIN_FUNCTIONAL_ASSESSMENT: 0-10

## 2022-11-27 ASSESSMENT — PAIN SCALES - GENERAL: PAINLEVEL_OUTOF10: 9

## 2022-11-27 ASSESSMENT — PAIN DESCRIPTION - DESCRIPTORS: DESCRIPTORS: ACHING

## 2022-11-27 ASSESSMENT — PAIN DESCRIPTION - LOCATION: LOCATION: GENERALIZED

## 2022-11-27 NOTE — DISCHARGE INSTRUCTIONS
Your flu test was positive. This is a virus, it must take its course. You may use Sudafed for nasal decongestant. Use the kind that you have to ask the pharmacist for, you you do not need a prescription for it however it is kept behind the counter due to some of the ingredients. Additionally, you may use Afrin for nasal decongestant. Follow-up with your primary care physician.

## 2022-11-27 NOTE — ED PROVIDER NOTES
[facility] Emergency Department Provider Note                   PCP:                Marci Neumann MD               Age: 38 y.o. Sex: female       ICD-10-CM    1. Influenza A  J10.1           DISPOSITION Decision To Discharge 2022 02:23:26 PM         Orders Placed This Encounter   Procedures    Rapid influenza A/B antigens    COVID-19, Rapid        Clearnce Laury is a 38 y.o. female who presents to the Emergency Department with chief complaint of    Chief Complaint   Patient presents with    Cough    Nasal Congestion      Patient is a 82-year-old female presents this department with son for chief complaint of concern for URI. Her and her son who is also patient exhibiting the same symptoms which include cough, fever, chills, body aches, nasal congestion. She reports her symptoms started Thursday. She been taking over-the-counter medication without significant improvement. States nasal congestion is worse today. Denies other symptoms at this time. The history is provided by the patient. No  was used. Review of Systems   Constitutional:  Positive for chills and fever. HENT:  Positive for congestion and sore throat. Respiratory:  Positive for cough and shortness of breath. Gastrointestinal:  Negative for abdominal pain. Genitourinary:  Negative for dysuria. Musculoskeletal:  Positive for myalgias. Neurological:  Positive for headaches. Psychiatric/Behavioral:  The patient is not nervous/anxious.       Past Medical History:   Diagnosis Date    Anxiety     Anxiety, generalized     Asthma     Albuterol inhaler prn    Chronic back pain     Depression     Migraine     Occasional tremors     Postpartum depression     Sickle cell disease ([facility] [address]     sickle cell trait        Past Surgical History:   Procedure Laterality Date    ANTERIOR CRUCIATE LIGAMENT REPAIR Right      DELIVERY ONLY      first pregnancy    CHOLECYSTECTOMY      PELVIC LAPAROSCOPY laparoscopy for endometriosis    SINUS SURGERY PROC UNLISTED          Family History   Problem Relation Age of Onset    Cancer Paternal Grandfather     Lung Disease Paternal Grandmother     Lung Disease Maternal Grandfather     Cancer Maternal Grandfather     Lung Disease Paternal Grandfather     Osteoarthritis Mother     Lung Disease Maternal Grandmother     Diabetes Mother     Hypertension Mother     Psychiatric Disorder Mother     Hypertension Father     Asthma Brother     Asthma Mother         Social History     Socioeconomic History    Marital status: Single   Tobacco Use    Smoking status: Never    Smokeless tobacco: Never   Substance and Sexual Activity    Alcohol use: No    Drug use: No         Latex, Sulfa antibiotics, Sumatriptan, Amoxicillin, Omeprazole, and Omeprazole magnesium     Previous Medications    ACETAMINOPHEN-BUTALBITAL  MG TABS    1 tablet as needed    ALBUTEROL SULFATE  (90 BASE) MCG/ACT INHALER    2 puffs as needed    DEXLANSOPRAZOLE (DEXILANT) 60 MG CPDR DELAYED RELEASE CAPSULE    Take by mouth daily    FLUOXETINE (PROZAC) 40 MG CAPSULE    Take 60 mg by mouth daily    GABAPENTIN (NEURONTIN) 100 MG CAPSULE    Take 100 mg by mouth 3 times daily. IBUPROFEN (ADVIL;MOTRIN) 800 MG TABLET    every 8 hours as needed    LOVASTATIN (MEVACOR) 20 MG TABLET    Take 20 mg by mouth daily    LURASIDONE (LATUDA) 40 MG TABS TABLET    Take 40 mg by mouth daily (with breakfast)    MAGNESIUM OXIDE (MAG-OX) 400 MG TABLET    1 tablet with food    PROPRANOLOL (INDERAL) 20 MG TABLET    Take 20 mg by mouth 3 times daily        Vitals signs and nursing note reviewed. Patient Vitals for the past 4 hrs:   Temp Pulse Resp BP SpO2   11/27/22 1341 99.6 °F (37.6 °C) 81 18 125/80 100 %          Physical Exam  Vitals and nursing note reviewed. Constitutional:       General: She is not in acute distress. Appearance: Normal appearance. She is not ill-appearing, toxic-appearing or diaphoretic.    HENT: Head: Normocephalic and atraumatic. Nose: Congestion present. Mouth/Throat:      Mouth: Mucous membranes are moist.   Eyes:      Pupils: Pupils are equal, round, and reactive to light. Cardiovascular:      Rate and Rhythm: Normal rate. Pulmonary:      Effort: Pulmonary effort is normal. No respiratory distress. Abdominal:      General: Abdomen is flat. Palpations: Abdomen is soft. Tenderness: There is no abdominal tenderness. Musculoskeletal:         General: Normal range of motion. Cervical back: Normal range of motion. No rigidity. Skin:     General: Skin is warm. Neurological:      General: No focal deficit present. Mental Status: She is alert. Psychiatric:         Mood and Affect: Mood normal.        MDM  Number of Diagnoses or Management Options  Influenza A: minor  Diagnosis management comments: COVID swab negative however flu is positive here in this department. Patient will need to be treated symptomatically, went over results with patient answered all questions. She verbalized understanding for follow-up and when to return here. Risk of Complications, Morbidity, and/or Mortality  Presenting problems: moderate  Diagnostic procedures: moderate  Management options: moderate        Procedures      Labs Reviewed   RAPID INFLUENZA A/B ANTIGENS - Abnormal; Notable for the following components:       Result Value    Influenza A Ag Positive (*)     All other components within normal limits   COVID-19, RAPID        No orders to display                          Voice dictation software was used during the making of this note. This software is not perfect and grammatical and other typographical errors may be present. This note has not been completely proofread for errors.      TERESSA Fonseca  11/27/22 7948

## 2022-11-27 NOTE — ED NOTES
I have reviewed discharge instructions with the patient. The patient verbalized understanding. Patient left ED via Discharge Method: ambulatory to Home with self  Opportunity for questions and clarification provided. Patient given 0 scripts. To continue your aftercare when you leave the hospital, you may receive an automated call from our care team to check in on how you are doing. This is a free service and part of our promise to provide the best care and service to meet your aftercare needs.  If you have questions, or wish to unsubscribe from this service please call [phone]. Thank you for Choosing our [facility] Emergency Department.        Luis Adkins RN  11/27/22 6165

## 2023-04-24 ENCOUNTER — TRANSCRIBE ORDERS (OUTPATIENT)
Dept: SCHEDULING | Age: 43
End: 2023-04-24

## 2023-04-24 DIAGNOSIS — Z12.31 ENCOUNTER FOR SCREENING MAMMOGRAM FOR HIGH-RISK PATIENT: Primary | ICD-10-CM

## 2023-05-01 ENCOUNTER — HOSPITAL ENCOUNTER (OUTPATIENT)
Dept: MAMMOGRAPHY | Age: 43
Discharge: HOME | End: 2023-05-04
Payer: MEDICARE

## 2023-05-01 DIAGNOSIS — Z12.31 ENCOUNTER FOR SCREENING MAMMOGRAM FOR HIGH-RISK PATIENT: ICD-10-CM

## 2023-05-01 PROCEDURE — 77067 SCR MAMMO BI INCL CAD: CPT

## 2023-07-08 ENCOUNTER — HOSPITAL ENCOUNTER (EMERGENCY)
Age: 43
Discharge: HOME | End: 2023-07-08
Attending: EMERGENCY MEDICINE
Payer: MEDICARE

## 2023-07-08 VITALS
BODY MASS INDEX: 29.23 KG/M2 | OXYGEN SATURATION: 100 % | DIASTOLIC BLOOD PRESSURE: 82 MMHG | RESPIRATION RATE: 16 BRPM | WEIGHT: 165 LBS | TEMPERATURE: 98.2 F | SYSTOLIC BLOOD PRESSURE: 154 MMHG | HEART RATE: 57 BPM | HEIGHT: 63 IN

## 2023-07-08 DIAGNOSIS — N89.8 VAGINAL DISCHARGE: Primary | ICD-10-CM

## 2023-07-08 DIAGNOSIS — K64.4 EXTERNAL HEMORRHOID: ICD-10-CM

## 2023-07-08 DIAGNOSIS — N76.0 BACTERIAL VAGINOSIS: ICD-10-CM

## 2023-07-08 DIAGNOSIS — B96.89 BACTERIAL VAGINOSIS: ICD-10-CM

## 2023-07-08 LAB
APPEARANCE UR: ABNORMAL
BACTERIA URNS QL MICRO: ABNORMAL /HPF
BILIRUB UR QL: NEGATIVE
CASTS URNS QL MICRO: ABNORMAL /LPF
COLOR UR: ABNORMAL
EPI CELLS #/AREA URNS HPF: ABNORMAL /HPF
GLUCOSE UR STRIP.AUTO-MCNC: NEGATIVE MG/DL
HGB UR QL STRIP: NEGATIVE
KETONES UR QL STRIP.AUTO: NEGATIVE MG/DL
LEUKOCYTE ESTERASE UR QL STRIP.AUTO: ABNORMAL
NITRITE UR QL STRIP.AUTO: NEGATIVE
PH UR STRIP: 5.5 (ref 5–9)
PROT UR STRIP-MCNC: NEGATIVE MG/DL
RBC #/AREA URNS HPF: ABNORMAL /HPF
RPR SER QL: NEGATIVE
SERVICE CMNT-IMP: NORMAL
SP GR UR REFRACTOMETRY: 1.01 (ref 1–1.02)
UROBILINOGEN UR QL STRIP.AUTO: 0.2 EU/DL (ref 0.2–1)
WBC URNS QL MICRO: ABNORMAL /HPF
WET PREP GENITAL: NORMAL

## 2023-07-08 PROCEDURE — 86592 SYPHILIS TEST NON-TREP QUAL: CPT

## 2023-07-08 PROCEDURE — 2500000003 HC RX 250 WO HCPCS: Performed by: PHYSICIAN ASSISTANT

## 2023-07-08 PROCEDURE — 87086 URINE CULTURE/COLONY COUNT: CPT

## 2023-07-08 PROCEDURE — 81001 URINALYSIS AUTO W/SCOPE: CPT

## 2023-07-08 PROCEDURE — 6370000000 HC RX 637 (ALT 250 FOR IP): Performed by: PHYSICIAN ASSISTANT

## 2023-07-08 PROCEDURE — 87389 HIV-1 AG W/HIV-1&-2 AB AG IA: CPT

## 2023-07-08 PROCEDURE — 6360000002 HC RX W HCPCS: Performed by: PHYSICIAN ASSISTANT

## 2023-07-08 PROCEDURE — 87491 CHLMYD TRACH DNA AMP PROBE: CPT

## 2023-07-08 PROCEDURE — 87210 SMEAR WET MOUNT SALINE/INK: CPT

## 2023-07-08 PROCEDURE — 87591 N.GONORRHOEAE DNA AMP PROB: CPT

## 2023-07-08 PROCEDURE — 96372 THER/PROPH/DIAG INJ SC/IM: CPT

## 2023-07-08 PROCEDURE — 99284 EMERGENCY DEPT VISIT MOD MDM: CPT

## 2023-07-08 RX ORDER — METRONIDAZOLE 500 MG/1
500 TABLET ORAL 2 TIMES DAILY
Qty: 14 TABLET | Refills: 0 | Status: SHIPPED | OUTPATIENT
Start: 2023-07-08 | End: 2023-07-15

## 2023-07-08 RX ORDER — LISINOPRIL 10 MG/1
TABLET ORAL
COMMUNITY
Start: 2023-04-20

## 2023-07-08 RX ORDER — ROSUVASTATIN CALCIUM 40 MG/1
TABLET, COATED ORAL
COMMUNITY
Start: 2023-05-30

## 2023-07-08 RX ORDER — DIAPER,BRIEF,INFANT-TODD,DISP
EACH MISCELLANEOUS
Qty: 30 G | Refills: 0 | Status: SHIPPED | OUTPATIENT
Start: 2023-07-08 | End: 2023-07-15

## 2023-07-08 RX ORDER — ONDANSETRON 4 MG/1
4 TABLET, ORALLY DISINTEGRATING ORAL
Status: COMPLETED | OUTPATIENT
Start: 2023-07-08 | End: 2023-07-08

## 2023-07-08 RX ORDER — FLUCONAZOLE 150 MG/1
150 TABLET ORAL ONCE
Qty: 1 TABLET | Refills: 0 | Status: SHIPPED | OUTPATIENT
Start: 2023-07-08 | End: 2023-07-08

## 2023-07-08 RX ORDER — DOXYCYCLINE HYCLATE 100 MG
100 TABLET ORAL 2 TIMES DAILY
Qty: 14 TABLET | Refills: 0 | Status: SHIPPED | OUTPATIENT
Start: 2023-07-08 | End: 2023-07-15

## 2023-07-08 RX ADMIN — ONDANSETRON 4 MG: 4 TABLET, ORALLY DISINTEGRATING ORAL at 18:26

## 2023-07-08 RX ADMIN — CEFTRIAXONE SODIUM 500 MG: 500 INJECTION, POWDER, FOR SOLUTION INTRAMUSCULAR; INTRAVENOUS at 17:11

## 2023-07-08 ASSESSMENT — PAIN SCALES - GENERAL: PAINLEVEL_OUTOF10: 6

## 2023-07-08 ASSESSMENT — PAIN - FUNCTIONAL ASSESSMENT: PAIN_FUNCTIONAL_ASSESSMENT: 0-10

## 2023-07-08 NOTE — DISCHARGE INSTRUCTIONS
Complete antibiotics as prescribed for full course of treatment. Do not drink alcohol with this medication as it will cause severe vomiting. You were tested for gonorrhea and chlamydia today. These test results are pending, [facility] in the next 48 hours. You were prophylactically treated in the emergency department today. You should abstain from any sexual activity for at least 10 days while you complete treatment and until symptoms completely resolved. Your partner should be tested and treated as well. Follow-up with your PCP in 1 to 2 days if no improvement. Return to the ER for any new or worsening symptoms.

## 2023-07-08 NOTE — ED PROVIDER NOTES
1.4286 mL IM Injection (500 mg IntraMUSCular Given 23 1711)   ondansetron (ZOFRAN-ODT) disintegrating tablet 4 mg (4 mg Oral Given 23 1826)       New Prescriptions    DOXYCYCLINE HYCLATE (VIBRA-TABS) 100 MG TABLET    Take 1 tablet by mouth 2 times daily for 7 days    FLUCONAZOLE (DIFLUCAN) 150 MG TABLET    Take 1 tablet by mouth once for 1 dose    HYDROCORTISONE (PREPARATION H) 1 % CREAM    Apply topically 2 times daily.     METRONIDAZOLE (FLAGYL) 500 MG TABLET    Take 1 tablet by mouth 2 times daily for 7 days        Past Medical History:   Diagnosis Date    Anxiety     Anxiety, generalized     Asthma     Albuterol inhaler prn    Chronic back pain     Depression     Hyperlipidemia     Migraine     Occasional tremors     Postpartum depression     Sickle cell disease ([address])     sickle cell trait        Past Surgical History:   Procedure Laterality Date    ANTERIOR CRUCIATE LIGAMENT REPAIR Right      DELIVERY ONLY      first pregnancy    CHOLECYSTECTOMY      PELVIC LAPAROSCOPY      laparoscopy for endometriosis    SINUS SURGERY PROC UNLISTED          Results for orders placed or performed during the hospital encounter of 23   Wet Prep    Specimen: Vaginal   Result Value Ref Range    Special Requests NO SPECIAL REQUESTS      Wet Prep 5 TO 10 WBC/HPF     Wet Prep FEW  CLUE CELLS PRESENT        Wet Prep NO YEAST SEEN      Wet Prep NO TRICHOMONAS SEEN     Urinalysis w rflx microscopic   Result Value Ref Range    Color, UA YELLOW/STRAW      Appearance CLOUDY      Specific [place], UA 1.014 1.001 - 1.023      pH, Urine 5.5 5.0 - 9.0      Protein, UA Negative NEG mg/dL    Glucose, UA Negative mg/dL    Ketones, Urine Negative NEG mg/dL    Bilirubin Urine Negative NEG      Blood, Urine Negative NEG      Urobilinogen, Urine 0.2 0.2 - 1.0 EU/dL    Nitrite, Urine Negative NEG      Leukocyte Esterase, Urine SMALL (A) NEG      WBC, UA 5-10 (A) U4 /hpf    RBC, UA 0-5 U5 /hpf    Epithelial Cells UA 10-20 (A) U5

## 2023-07-08 NOTE — ED TRIAGE NOTES
Patient reports vaginal irritation and discharge some pain with urination. Patient notes two areas around vaginal that are white in color.

## 2023-07-09 LAB
HIV 1+2 AB+HIV1 P24 AG SERPL QL IA: NONREACTIVE
HIV 1/2 RESULT COMMENT: NORMAL

## 2023-07-12 LAB
BACTERIA SPEC CULT: ABNORMAL
BACTERIA SPEC CULT: ABNORMAL
SERVICE CMNT-IMP: ABNORMAL

## 2023-08-09 NOTE — PROGRESS NOTES
The patient is a 41 y.o. Jorge Dimas who is seen for a referral to establish care. Pt states she has a hx of BV and has not had a pap smear in a long time. Pt was also on the Depo shot but missed her depo in  and has not had it since. She would like to resume depo provera. Was seeing Quique gibson but was not contacted for her next inj. Last time she was sexually active was 3 wks ago. Pt was seen in Ed for concerns of vaginal discharge on . She was prophylactically tx for gc/ch with rocephin and doxy  Gc/chl was neg in ED. Was given flagyl for dx of BV on wet prep in ED. She notes odor improved after use of flagyl  Still c/o lingering discharge and irritation     Pt states she had ultrasound 1 yr ago performed ordered by PCP due to pelvic pain. She was told had some calcifications and is unsure the interpretation. She reports that she continues to have midline pelvic pain, sometimes sharp and sometimes throbbing, comes and goes. She feels it about 5 times a week. Pt c/o white spots around rectal area. She notes has IBS, has diarrhea a lot, not sure if r/t hemorrhoids and wiping or if something else. +itching and irritaiton. Pt states these perianal areas are white but also with some areas of broken skin but they seem to flare. She has had it on and off for about a year. She did have HSV serology testing 2 yrs ago which was + for HSV 1 and 2. She is unsure if this area was ever swabbed for HSV. Unsure if she has hemorrhoids. Last Pap Smear: 17  Pap: Negative  HPV: was not met. HISTORY:      No LMP recorded. Patient has had an injection.   Sexual History:  single partner, contraception - Depo-Provera injections  Contraception:  Depo-Provera injections  Current Outpatient Medications on File Prior to Visit   Medication Sig Dispense Refill    zolpidem (AMBIEN CR) 12.5 MG extended release tablet TAKE 1 TABLET BY MOUTH ONCE A DAY AT BEDTIME AS NEEDED FOR 30 DAYS      LORazepam

## 2023-08-10 ENCOUNTER — OFFICE VISIT (OUTPATIENT)
Dept: OBGYN CLINIC | Age: 43
End: 2023-08-10
Payer: MEDICARE

## 2023-08-10 VITALS
WEIGHT: 154.3 LBS | BODY MASS INDEX: 27.34 KG/M2 | DIASTOLIC BLOOD PRESSURE: 74 MMHG | HEIGHT: 63 IN | SYSTOLIC BLOOD PRESSURE: 122 MMHG

## 2023-08-10 DIAGNOSIS — N89.8 VAGINAL IRRITATION: ICD-10-CM

## 2023-08-10 DIAGNOSIS — Z78.9 USES DEPO-PROVERA AS PRIMARY BIRTH CONTROL METHOD: ICD-10-CM

## 2023-08-10 DIAGNOSIS — K62.9 PERIANAL LESION: ICD-10-CM

## 2023-08-10 DIAGNOSIS — R10.2 PELVIC PAIN: Primary | ICD-10-CM

## 2023-08-10 DIAGNOSIS — N89.8 VAGINAL DISCHARGE: ICD-10-CM

## 2023-08-10 LAB — HCG SERPL-ACNC: <1 MIU/ML (ref 0–6)

## 2023-08-10 PROCEDURE — 1036F TOBACCO NON-USER: CPT | Performed by: NURSE PRACTITIONER

## 2023-08-10 PROCEDURE — G8427 DOCREV CUR MEDS BY ELIG CLIN: HCPCS | Performed by: NURSE PRACTITIONER

## 2023-08-10 PROCEDURE — 99204 OFFICE O/P NEW MOD 45 MIN: CPT | Performed by: NURSE PRACTITIONER

## 2023-08-10 PROCEDURE — G8419 CALC BMI OUT NRM PARAM NOF/U: HCPCS | Performed by: NURSE PRACTITIONER

## 2023-08-10 RX ORDER — ZOLPIDEM TARTRATE 12.5 MG/1
TABLET, FILM COATED, EXTENDED RELEASE ORAL
COMMUNITY
Start: 2023-07-20

## 2023-08-10 RX ORDER — LORAZEPAM 1 MG/1
TABLET ORAL
COMMUNITY
Start: 2023-07-17

## 2023-08-10 RX ORDER — MEDROXYPROGESTERONE ACETATE 150 MG/ML
INJECTION, SUSPENSION INTRAMUSCULAR
COMMUNITY
Start: 2023-05-30 | End: 2023-08-10

## 2023-08-10 RX ORDER — MEDROXYPROGESTERONE ACETATE 150 MG/ML
150 INJECTION, SUSPENSION INTRAMUSCULAR
Qty: 1 ML | Refills: 3 | Status: SHIPPED | OUTPATIENT
Start: 2023-08-10

## 2023-08-12 LAB
A VAGINAE DNA VAG QL NAA+PROBE: NORMAL SCORE
BVAB2 DNA VAG QL NAA+PROBE: NORMAL SCORE
C ALBICANS DNA VAG QL NAA+PROBE: NEGATIVE
C GLABRATA DNA VAG QL NAA+PROBE: NEGATIVE
MEGA1 DNA VAG QL NAA+PROBE: NORMAL SCORE
SPECIMEN SOURCE: NORMAL
T VAGINALIS RRNA SPEC QL NAA+PROBE: NEGATIVE

## 2023-08-18 LAB
HSV1 DNA SPEC QL NAA+PROBE: NEGATIVE
HSV2 DNA SPEC QL NAA+PROBE: NEGATIVE
SPECIMEN SOURCE: NORMAL

## 2023-08-31 ENCOUNTER — OFFICE VISIT (OUTPATIENT)
Dept: OBGYN CLINIC | Age: 43
End: 2023-08-31
Payer: MEDICARE

## 2023-08-31 VITALS — DIASTOLIC BLOOD PRESSURE: 74 MMHG | SYSTOLIC BLOOD PRESSURE: 122 MMHG

## 2023-08-31 DIAGNOSIS — R10.2 PELVIC PAIN: Primary | ICD-10-CM

## 2023-08-31 DIAGNOSIS — N90.9 LESION OF FEMALE PERINEUM: ICD-10-CM

## 2023-08-31 PROCEDURE — 99213 OFFICE O/P EST LOW 20 MIN: CPT | Performed by: NURSE PRACTITIONER

## 2023-08-31 PROCEDURE — G8427 DOCREV CUR MEDS BY ELIG CLIN: HCPCS | Performed by: NURSE PRACTITIONER

## 2023-08-31 PROCEDURE — G8419 CALC BMI OUT NRM PARAM NOF/U: HCPCS | Performed by: NURSE PRACTITIONER

## 2023-08-31 PROCEDURE — 1036F TOBACCO NON-USER: CPT | Performed by: NURSE PRACTITIONER

## 2023-08-31 PROCEDURE — 76830 TRANSVAGINAL US NON-OB: CPT | Performed by: NURSE PRACTITIONER

## 2023-08-31 NOTE — PROGRESS NOTES
The patient is a 41 y.o.  who is seen for an US and visit for pelvic pain. That has been ongoing for over a year. She has not yet resumed her depo provera injection, she will rtc for this. She has not had sex in weeks. Advised to abstain until 1 wk after depo inj. She has had an open lesion, somewhat raised  at L lateral perineal area that has been present for 1 yr. This area also has a hypopigmented appearance. She states it has worsened since last visit. HSV swab was taken at this area and it was neg. She is concerned that this area has been persistent for so long. It is uncomfortable and tender. Ultrasound findings from today 23  GYN US secondary to pelvic pain  CX appears wnl  Uterus anteverted and heterogenous  Endo= 2.4 mm, no intracavitary masses visualized  ROV visualized with follicles and appears wnl  LOV visualized with follicles and simple cyst 1.7 cm  No adnexas masses visualized  Mild free fluid right adnexa    HISTORY:      No LMP recorded. Patient has had an injection.   Sexual History:  single partner, contraception - Depo-Provera injections  Contraception:  Depo-Provera injections  Current Outpatient Medications on File Prior to Visit   Medication Sig Dispense Refill    zolpidem (AMBIEN CR) 12.5 MG extended release tablet TAKE 1 TABLET BY MOUTH ONCE A DAY AT BEDTIME AS NEEDED FOR 30 DAYS      LORazepam (ATIVAN) 1 MG tablet TAKE 1 TABLET BY MOUTH TWICE A DAY FOR 30 DAYS      medroxyPROGESTERone (DEPO-PROVERA) 150 MG/ML injection Inject 1 mL into the muscle every 3 months 1 mL 3    lisinopril (PRINIVIL;ZESTRIL) 10 MG tablet TAKE 1 TABLET BY MOUTH EVERY DAY FOR 90 DAYS      rosuvastatin (CRESTOR) 40 MG tablet TAKE 1 TABLET BY MOUTH EVERY DAY FOR 90 DAYS      albuterol sulfate  (90 Base) MCG/ACT inhaler 2 puffs as needed      ACETAMINOPHEN-BUTALBITAL  MG TABS 1 tablet as needed      dexlansoprazole (DEXILANT) 60 MG CPDR delayed release capsule Take by

## 2023-09-05 ENCOUNTER — TELEPHONE (OUTPATIENT)
Dept: OBGYN CLINIC | Age: 43
End: 2023-09-05

## 2023-09-05 NOTE — TELEPHONE ENCOUNTER
Patient is in the office today for her Medroxyprogesterone 150 mg/ml IM injection. Quant ob 08/10 was negative and patient has not been sexually active in two months. Urine PT today is Negative.     [address]  # [id number]  Lot # [id number]  Exp 05/2025  Site LGM  Return 11/21 - 12/05 for next injection

## 2023-09-12 ENCOUNTER — OFFICE VISIT (OUTPATIENT)
Dept: OBGYN CLINIC | Age: 43
End: 2023-09-12

## 2023-09-12 VITALS
HEIGHT: 63 IN | WEIGHT: 153.3 LBS | SYSTOLIC BLOOD PRESSURE: 142 MMHG | BODY MASS INDEX: 27.16 KG/M2 | DIASTOLIC BLOOD PRESSURE: 80 MMHG

## 2023-09-12 DIAGNOSIS — N90.4 VULVAR DYSTROPHY: Primary | ICD-10-CM

## 2023-09-12 NOTE — PROGRESS NOTES
The patient is a 41 y.o.  who is seen for a possible vulvar bx. Patient was last seen on 2023. It was noted on exam that there was hyperpigmented skin on the left lateral perineum. 8/10/2023 HSV 1&2 negative         Nuswab negative   See Margarita Uribe note- itching irritation has been going on for over year no change in bowel habits no stool near lesion  Has hsv + igG 1, 2 in past but RAJAN neg  HISTORY:      No LMP recorded. Patient has had an injection. Sexual History:  has sex with males  Contraception:  Depo-Provera injections  Current Outpatient Medications on File Prior to Visit   Medication Sig Dispense Refill    zolpidem (AMBIEN CR) 12.5 MG extended release tablet TAKE 1 TABLET BY MOUTH ONCE A DAY AT BEDTIME AS NEEDED FOR 30 DAYS      LORazepam (ATIVAN) 1 MG tablet TAKE 1 TABLET BY MOUTH TWICE A DAY FOR 30 DAYS      medroxyPROGESTERone (DEPO-PROVERA) 150 MG/ML injection Inject 1 mL into the muscle every 3 months 1 mL 3    lisinopril (PRINIVIL;ZESTRIL) 10 MG tablet TAKE 1 TABLET BY MOUTH EVERY DAY FOR 90 DAYS      rosuvastatin (CRESTOR) 40 MG tablet TAKE 1 TABLET BY MOUTH EVERY DAY FOR 90 DAYS      albuterol sulfate  (90 Base) MCG/ACT inhaler 2 puffs as needed      ACETAMINOPHEN-BUTALBITAL  MG TABS 1 tablet as needed      dexlansoprazole (DEXILANT) 60 MG CPDR delayed release capsule Take by mouth daily      FLUoxetine (PROZAC) 40 MG capsule Take 60 mg by mouth daily      ibuprofen (ADVIL;MOTRIN) 800 MG tablet every 8 hours as needed      lurasidone (LATUDA) 40 MG TABS tablet Take 1 tablet by mouth daily (with breakfast)      propranolol (INDERAL) 20 MG tablet Take 1 tablet by mouth 3 times daily      gabapentin (NEURONTIN) 100 MG capsule Take 100 mg by mouth 3 times daily. No current facility-administered medications on file prior to visit. ROS:  Feeling well. No dyspnea or chest pain on exertion. No abdominal pain, change in bowel habits, black or bloody stools.   No

## 2023-09-27 NOTE — PROGRESS NOTES
The patient is a 41 y.o. [place] who is seen for follow up after biopsy on 23. Pt denies fever/bleeding/discharge or other signs of infection. Surgical pathology:    A:  \"PERIANAL\":         SQUAMOUS EPITHELIUM HAVING FOCALLY DILATED HAIR FOLLICLE IN   ASSOCIATION WITH HYPERKERATOSIS AND FOCAL CHANGES OF REPAIR, NONDIAGNOSTIC   FOR MALIGNANT TUMOR AND SIGNIFICANT DYSPLASIA     HISTORY:      No LMP recorded. Patient has had an injection. Current Outpatient Medications on File Prior to Visit   Medication Sig Dispense Refill    zolpidem (AMBIEN CR) 12.5 MG extended release tablet TAKE 1 TABLET BY MOUTH ONCE A DAY AT BEDTIME AS NEEDED FOR 30 DAYS      LORazepam (ATIVAN) 1 MG tablet TAKE 1 TABLET BY MOUTH TWICE A DAY FOR 30 DAYS      medroxyPROGESTERone (DEPO-PROVERA) 150 MG/ML injection Inject 1 mL into the muscle every 3 months 1 mL 3    lisinopril (PRINIVIL;ZESTRIL) 10 MG tablet TAKE 1 TABLET BY MOUTH EVERY DAY FOR 90 DAYS      rosuvastatin (CRESTOR) 40 MG tablet TAKE 1 TABLET BY MOUTH EVERY DAY FOR 90 DAYS      albuterol sulfate  (90 Base) MCG/ACT inhaler 2 puffs as needed      ACETAMINOPHEN-BUTALBITAL  MG TABS 1 tablet as needed      dexlansoprazole (DEXILANT) 60 MG CPDR delayed release capsule Take by mouth daily      FLUoxetine (PROZAC) 40 MG capsule Take 60 mg by mouth daily      ibuprofen (ADVIL;MOTRIN) 800 MG tablet every 8 hours as needed      lurasidone (LATUDA) 40 MG TABS tablet Take 1 tablet by mouth daily (with breakfast)      propranolol (INDERAL) 20 MG tablet Take 1 tablet by mouth 3 times daily      gabapentin (NEURONTIN) 100 MG capsule Take 100 mg by mouth 3 times daily. No current facility-administered medications on file prior to visit. ROS:  Feeling well. No dyspnea or chest pain on exertion. No abdominal pain, change in bowel habits, black or bloody stools. No urinary tract symptoms.  GYN ROS: no breast pain or new or enlarging lumps on self

## 2023-09-28 ENCOUNTER — OFFICE VISIT (OUTPATIENT)
Dept: OBGYN CLINIC | Age: 43
End: 2023-09-28
Payer: MEDICARE

## 2023-09-28 VITALS
WEIGHT: 156 LBS | HEIGHT: 63 IN | DIASTOLIC BLOOD PRESSURE: 86 MMHG | SYSTOLIC BLOOD PRESSURE: 136 MMHG | BODY MASS INDEX: 27.64 KG/M2

## 2023-09-28 DIAGNOSIS — N90.4 VULVAR DYSTROPHY: Primary | ICD-10-CM

## 2023-09-28 PROCEDURE — 1036F TOBACCO NON-USER: CPT | Performed by: OBSTETRICS & GYNECOLOGY

## 2023-09-28 PROCEDURE — 99214 OFFICE O/P EST MOD 30 MIN: CPT | Performed by: OBSTETRICS & GYNECOLOGY

## 2023-09-28 PROCEDURE — G8419 CALC BMI OUT NRM PARAM NOF/U: HCPCS | Performed by: OBSTETRICS & GYNECOLOGY

## 2023-09-28 PROCEDURE — G8427 DOCREV CUR MEDS BY ELIG CLIN: HCPCS | Performed by: OBSTETRICS & GYNECOLOGY

## 2023-10-10 NOTE — PROGRESS NOTES
The patient is a 41 y.o. [facility] who is seen for follow up after biopsy on 23. Pt denies fever/bleeding/discharge or other s/s of infection. Pt reporting odor and itching that started on , pt denies discharge or fever. Surgical pathology:    A:  \"PERIANAL\":         SQUAMOUS EPITHELIUM HAVING FOCALLY DILATED HAIR FOLLICLE IN   ASSOCIATION WITH HYPERKERATOSIS AND FOCAL CHANGES OF REPAIR, NONDIAGNOSTIC   FOR MALIGNANT TUMOR AND SIGNIFICANT DYSPLASIA     HISTORY:      No LMP recorded. Patient has had an injection. Current Outpatient Medications on File Prior to Visit   Medication Sig Dispense Refill    zolpidem (AMBIEN CR) 12.5 MG extended release tablet TAKE 1 TABLET BY MOUTH ONCE A DAY AT BEDTIME AS NEEDED FOR 30 DAYS      LORazepam (ATIVAN) 1 MG tablet TAKE 1 TABLET BY MOUTH TWICE A DAY FOR 30 DAYS      medroxyPROGESTERone (DEPO-PROVERA) 150 MG/ML injection Inject 1 mL into the muscle every 3 months 1 mL 3    lisinopril (PRINIVIL;ZESTRIL) 10 MG tablet Take 2 tablets by mouth daily      rosuvastatin (CRESTOR) 40 MG tablet TAKE 1 TABLET BY MOUTH EVERY DAY FOR 90 DAYS      albuterol sulfate  (90 Base) MCG/ACT inhaler 2 puffs as needed      ACETAMINOPHEN-BUTALBITAL  MG TABS 1 tablet as needed      dexlansoprazole (DEXILANT) 60 MG CPDR delayed release capsule Take by mouth daily      FLUoxetine (PROZAC) 40 MG capsule Take 60 mg by mouth daily      ibuprofen (ADVIL;MOTRIN) 800 MG tablet every 8 hours as needed      lurasidone (LATUDA) 40 MG TABS tablet Take 1 tablet by mouth daily (with breakfast)      propranolol (INDERAL) 20 MG tablet Take 2 tablets by mouth 2 times daily      gabapentin (NEURONTIN) 100 MG capsule Take 100 mg by mouth 3 times daily. No current facility-administered medications on file prior to visit. ROS:  Feeling well. No dyspnea or chest pain on exertion. No abdominal pain, change in bowel habits, black or bloody stools. No urinary tract symptoms.  GYN

## 2023-10-11 ENCOUNTER — OFFICE VISIT (OUTPATIENT)
Dept: OBGYN CLINIC | Age: 43
End: 2023-10-11
Payer: MEDICARE

## 2023-10-11 VITALS
BODY MASS INDEX: 26.51 KG/M2 | HEIGHT: 63 IN | WEIGHT: 149.6 LBS | SYSTOLIC BLOOD PRESSURE: 128 MMHG | DIASTOLIC BLOOD PRESSURE: 82 MMHG

## 2023-10-11 DIAGNOSIS — A60.03 HERPES SIMPLEX CERVICITIS: ICD-10-CM

## 2023-10-11 DIAGNOSIS — R39.9 UTI SYMPTOMS: Primary | ICD-10-CM

## 2023-10-11 DIAGNOSIS — N89.8 VAGINAL IRRITATION: ICD-10-CM

## 2023-10-11 DIAGNOSIS — N90.4 VULVAR DYSTROPHY: ICD-10-CM

## 2023-10-11 DIAGNOSIS — R30.0 BURNING WITH URINATION: ICD-10-CM

## 2023-10-11 DIAGNOSIS — R10.2 PELVIC PAIN: ICD-10-CM

## 2023-10-11 LAB
BILIRUBIN, URINE, POC: NEGATIVE
BLOOD URINE, POC: NORMAL
GLUCOSE URINE, POC: NEGATIVE
KETONES, URINE, POC: NEGATIVE
LEUKOCYTE ESTERASE, URINE, POC: NEGATIVE
NITRITE, URINE, POC: NEGATIVE
PH, URINE, POC: 6 (ref 4.6–8)
PROTEIN,URINE, POC: NEGATIVE
SPECIFIC GRAVITY, URINE, POC: 1.01 (ref 1–1.03)
URINALYSIS CLARITY, POC: CLEAR
URINALYSIS COLOR, POC: YELLOW
UROBILINOGEN, POC: NORMAL

## 2023-10-11 PROCEDURE — G8427 DOCREV CUR MEDS BY ELIG CLIN: HCPCS | Performed by: OBSTETRICS & GYNECOLOGY

## 2023-10-11 PROCEDURE — 81003 URINALYSIS AUTO W/O SCOPE: CPT | Performed by: OBSTETRICS & GYNECOLOGY

## 2023-10-11 PROCEDURE — 99214 OFFICE O/P EST MOD 30 MIN: CPT | Performed by: OBSTETRICS & GYNECOLOGY

## 2023-10-11 PROCEDURE — G8419 CALC BMI OUT NRM PARAM NOF/U: HCPCS | Performed by: OBSTETRICS & GYNECOLOGY

## 2023-10-11 PROCEDURE — 1036F TOBACCO NON-USER: CPT | Performed by: OBSTETRICS & GYNECOLOGY

## 2023-10-11 PROCEDURE — G8484 FLU IMMUNIZE NO ADMIN: HCPCS | Performed by: OBSTETRICS & GYNECOLOGY

## 2023-10-11 RX ORDER — VALACYCLOVIR HYDROCHLORIDE 1 G/1
TABLET, FILM COATED ORAL
Qty: 4 TABLET | Refills: 0 | Status: SHIPPED | OUTPATIENT
Start: 2023-10-11

## 2023-10-11 RX ORDER — CLOBETASOL PROPIONATE 0.5 MG/G
OINTMENT TOPICAL
Qty: 45 G | Refills: 1 | Status: SHIPPED | OUTPATIENT
Start: 2023-10-11

## 2023-10-13 LAB
A VAGINAE DNA VAG QL NAA+PROBE: NORMAL SCORE
BVAB2 DNA VAG QL NAA+PROBE: NORMAL SCORE
C ALBICANS DNA VAG QL NAA+PROBE: NEGATIVE
C GLABRATA DNA VAG QL NAA+PROBE: NEGATIVE
C TRACH RRNA SPEC QL NAA+PROBE: NEGATIVE
MEGA1 DNA VAG QL NAA+PROBE: NORMAL SCORE
N GONORRHOEA RRNA SPEC QL NAA+PROBE: NEGATIVE
SPECIMEN SOURCE: NORMAL
T VAGINALIS RRNA SPEC QL NAA+PROBE: NEGATIVE

## 2023-10-16 ENCOUNTER — TELEPHONE (OUTPATIENT)
Dept: OBGYN CLINIC | Age: 43
End: 2023-10-16

## 2023-10-16 NOTE — TELEPHONE ENCOUNTER
----- Message from Madeleine Schafer MD sent at 10/16/2023 10:52 AM EDT -----  Notify all neg  ----- Message -----  From: Veronica Giordano  Sent: 10/11/2023   2:15 PM EDT  To: Madeleine Schafer MD

## 2023-10-16 NOTE — TELEPHONE ENCOUNTER
Phoned patient to review negative Nuswab. Pt still reporting itching and odor. Pt prescribed clobetasol with taper on 10/11/23. Pt instructed to continue use of clobetasol to aid in relief of itching and update office if no improvement once medication is completed. Pt reports that she never started the clobetasol as her insurance would not pay for the medication. Pt instructed to call insurance company to check for comparable medication and notify RN. Pt verbalized understanding.

## 2023-11-07 NOTE — PROGRESS NOTES
MICRO       mammogram  pap smear  return annually or prn  Hsv- 500 vlatrex daily  Refill depo for the year    Royce Purvis RN

## 2023-11-08 ENCOUNTER — OFFICE VISIT (OUTPATIENT)
Dept: OBGYN CLINIC | Age: 43
End: 2023-11-08
Payer: MEDICARE

## 2023-11-08 VITALS
DIASTOLIC BLOOD PRESSURE: 84 MMHG | HEIGHT: 63 IN | WEIGHT: 148 LBS | SYSTOLIC BLOOD PRESSURE: 128 MMHG | BODY MASS INDEX: 26.22 KG/M2

## 2023-11-08 DIAGNOSIS — Z12.31 SCREENING MAMMOGRAM FOR BREAST CANCER: ICD-10-CM

## 2023-11-08 DIAGNOSIS — Z13.89 SCREENING FOR GENITOURINARY CONDITION: ICD-10-CM

## 2023-11-08 DIAGNOSIS — Z12.4 SCREENING FOR CERVICAL CANCER: ICD-10-CM

## 2023-11-08 DIAGNOSIS — Z01.419 WELL WOMAN EXAM: Primary | ICD-10-CM

## 2023-11-08 DIAGNOSIS — Z11.51 SCREENING FOR HUMAN PAPILLOMAVIRUS (HPV): ICD-10-CM

## 2023-11-08 LAB
BILIRUBIN, URINE, POC: NEGATIVE
BLOOD URINE, POC: NORMAL
GLUCOSE URINE, POC: NEGATIVE
KETONES, URINE, POC: NEGATIVE
LEUKOCYTE ESTERASE, URINE, POC: NEGATIVE
NITRITE, URINE, POC: NEGATIVE
PH, URINE, POC: 6 (ref 4.6–8)
PROTEIN,URINE, POC: NEGATIVE
SPECIFIC GRAVITY, URINE, POC: 1.02 (ref 1–1.03)
URINALYSIS CLARITY, POC: CLEAR
URINALYSIS COLOR, POC: YELLOW
UROBILINOGEN, POC: NORMAL

## 2023-11-08 PROCEDURE — G0101 CA SCREEN;PELVIC/BREAST EXAM: HCPCS | Performed by: OBSTETRICS & GYNECOLOGY

## 2023-11-08 PROCEDURE — 81002 URINALYSIS NONAUTO W/O SCOPE: CPT | Performed by: OBSTETRICS & GYNECOLOGY

## 2023-11-08 PROCEDURE — G8484 FLU IMMUNIZE NO ADMIN: HCPCS | Performed by: OBSTETRICS & GYNECOLOGY

## 2023-11-08 RX ORDER — VALACYCLOVIR HYDROCHLORIDE 500 MG/1
500 TABLET, FILM COATED ORAL DAILY
Qty: 90 TABLET | Refills: 4 | Status: SHIPPED | OUTPATIENT
Start: 2023-11-08

## 2023-11-08 RX ORDER — MEDROXYPROGESTERONE ACETATE 150 MG/ML
150 INJECTION, SUSPENSION INTRAMUSCULAR
Qty: 1 ML | Refills: 3 | Status: SHIPPED | OUTPATIENT
Start: 2023-11-08

## 2023-11-13 LAB
COLLECTION METHOD: NORMAL
CYTOLOGIST CVX/VAG CYTO: NORMAL
CYTOLOGY CVX/VAG DOC THIN PREP: NORMAL
DATE OF LMP: NORMAL
HPV APTIMA: NEGATIVE
Lab: NORMAL
PAP SOURCE: NORMAL
PATH REPORT.FINAL DX SPEC: NORMAL
PREV CYTO INFO: NORMAL
PREV TREATMENT RESULTS: NORMAL
PREV TREATMENT: NORMAL
STAT OF ADQ CVX/VAG CYTO-IMP: NORMAL

## 2023-11-21 ENCOUNTER — HOSPITAL ENCOUNTER (OUTPATIENT)
Dept: SLEEP MEDICINE | Age: 43
Discharge: HOME | End: 2023-11-24

## 2023-12-12 ENCOUNTER — TELEPHONE (OUTPATIENT)
Dept: OBGYN CLINIC | Age: 43
End: 2023-12-12

## 2023-12-12 DIAGNOSIS — Z78.9 USES DEPO-PROVERA AS PRIMARY BIRTH CONTROL METHOD: Primary | ICD-10-CM

## 2023-12-12 NOTE — TELEPHONE ENCOUNTER
Pt LVM stating she missed her Depo injection last month and reports a cycle that lasted a few days and is now reporting brown spotting.

## 2024-03-17 ENCOUNTER — HOSPITAL ENCOUNTER (EMERGENCY)
Age: 44
Discharge: HOME | End: 2024-03-17
Attending: EMERGENCY MEDICINE
Payer: MEDICARE

## 2024-03-17 ENCOUNTER — APPOINTMENT (OUTPATIENT)
Dept: GENERAL RADIOLOGY | Age: 44
End: 2024-03-17
Payer: MEDICARE

## 2024-03-17 VITALS
HEART RATE: 82 BPM | RESPIRATION RATE: 18 BRPM | HEIGHT: 63 IN | DIASTOLIC BLOOD PRESSURE: 63 MMHG | SYSTOLIC BLOOD PRESSURE: 116 MMHG | TEMPERATURE: 98.4 F | BODY MASS INDEX: 27.46 KG/M2 | WEIGHT: 155 LBS | OXYGEN SATURATION: 98 %

## 2024-03-17 DIAGNOSIS — B34.9 ACUTE VIRAL SYNDROME: Primary | ICD-10-CM

## 2024-03-17 LAB
ALBUMIN SERPL-MCNC: 3.2 G/DL (ref 3.5–5)
ALBUMIN/GLOB SERPL: 0.8 (ref 0.4–1.6)
ALP SERPL-CCNC: 90 U/L (ref 50–130)
ALT SERPL-CCNC: 30 U/L (ref 12–65)
ANION GAP SERPL CALC-SCNC: 8 MMOL/L (ref 2–11)
AST SERPL-CCNC: 16 U/L (ref 15–37)
BASOPHILS # BLD: 0 K/UL (ref 0–0.2)
BASOPHILS NFR BLD: 0 % (ref 0–2)
BILIRUB SERPL-MCNC: 0.4 MG/DL (ref 0.2–1.1)
BUN SERPL-MCNC: 7 MG/DL (ref 6–23)
CALCIUM SERPL-MCNC: 9.1 MG/DL (ref 8.3–10.4)
CHLORIDE SERPL-SCNC: 102 MMOL/L (ref 103–113)
CO2 SERPL-SCNC: 26 MMOL/L (ref 21–32)
CREAT SERPL-MCNC: 0.85 MG/DL (ref 0.6–1)
DIFFERENTIAL METHOD BLD: ABNORMAL
EOSINOPHIL # BLD: 0.1 K/UL (ref 0–0.8)
EOSINOPHIL NFR BLD: 1 % (ref 0.5–7.8)
ERYTHROCYTE [DISTWIDTH] IN BLOOD BY AUTOMATED COUNT: 11.7 % (ref 11.9–14.6)
FLUAV RNA SPEC QL NAA+PROBE: NOT DETECTED
FLUBV RNA SPEC QL NAA+PROBE: NOT DETECTED
GLOBULIN SER CALC-MCNC: 4.1 G/DL (ref 2.8–4.5)
GLUCOSE SERPL-MCNC: 103 MG/DL (ref 65–100)
HCG UR QL: NEGATIVE
HCT VFR BLD AUTO: 33 % (ref 35.8–46.3)
HGB BLD-MCNC: 11.5 G/DL (ref 11.7–15.4)
HGB RETIC QN AUTO: 34 PG (ref 29–35)
IMM GRANULOCYTES # BLD AUTO: 0 K/UL (ref 0–0.5)
IMM GRANULOCYTES NFR BLD AUTO: 0 % (ref 0–5)
IMM RETICS NFR: 16 % (ref 3–15.9)
LYMPHOCYTES # BLD: 1.1 K/UL (ref 0.5–4.6)
LYMPHOCYTES NFR BLD: 12 % (ref 13–44)
MCH RBC QN AUTO: 29.3 PG (ref 26.1–32.9)
MCHC RBC AUTO-ENTMCNC: 34.8 G/DL (ref 31.4–35)
MCV RBC AUTO: 84.2 FL (ref 82–102)
MONOCYTES # BLD: 0.6 K/UL (ref 0.1–1.3)
MONOCYTES NFR BLD: 7 % (ref 4–12)
NEUTS SEG # BLD: 7.1 K/UL (ref 1.7–8.2)
NEUTS SEG NFR BLD: 80 % (ref 43–78)
NRBC # BLD: 0 K/UL (ref 0–0.2)
PLATELET # BLD AUTO: 282 K/UL (ref 150–450)
PMV BLD AUTO: 8.4 FL (ref 9.4–12.3)
POTASSIUM SERPL-SCNC: 3.4 MMOL/L (ref 3.5–5.1)
PROT SERPL-MCNC: 7.3 G/DL (ref 6.3–8.2)
RBC # BLD AUTO: 3.92 M/UL (ref 4.05–5.2)
RETICS # AUTO: 0.09 M/UL (ref 0.03–0.1)
RETICS/RBC NFR AUTO: 2.3 % (ref 0.3–2)
SARS-COV-2 RDRP RESP QL NAA+PROBE: NOT DETECTED
SODIUM SERPL-SCNC: 136 MMOL/L (ref 136–146)
SOURCE: NORMAL
STREP, MOLECULAR: NOT DETECTED
WBC # BLD AUTO: 8.9 K/UL (ref 4.3–11.1)

## 2024-03-17 PROCEDURE — 71046 X-RAY EXAM CHEST 2 VIEWS: CPT

## 2024-03-17 PROCEDURE — 85046 RETICYTE/HGB CONCENTRATE: CPT

## 2024-03-17 PROCEDURE — 87502 INFLUENZA DNA AMP PROBE: CPT

## 2024-03-17 PROCEDURE — 81025 URINE PREGNANCY TEST: CPT

## 2024-03-17 PROCEDURE — 99284 EMERGENCY DEPT VISIT MOD MDM: CPT

## 2024-03-17 PROCEDURE — 87651 STREP A DNA AMP PROBE: CPT

## 2024-03-17 PROCEDURE — 80053 COMPREHEN METABOLIC PANEL: CPT

## 2024-03-17 PROCEDURE — 87635 SARS-COV-2 COVID-19 AMP PRB: CPT

## 2024-03-17 PROCEDURE — 85025 COMPLETE CBC W/AUTO DIFF WBC: CPT

## 2024-03-17 PROCEDURE — 81003 URINALYSIS AUTO W/O SCOPE: CPT

## 2024-03-17 RX ORDER — BENZONATATE 200 MG/1
200 CAPSULE ORAL 3 TIMES DAILY PRN
Qty: 30 CAPSULE | Refills: 0 | Status: SHIPPED | OUTPATIENT
Start: 2024-03-17 | End: 2024-03-24

## 2024-03-17 ASSESSMENT — PAIN - FUNCTIONAL ASSESSMENT: PAIN_FUNCTIONAL_ASSESSMENT: 0-10

## 2024-03-17 ASSESSMENT — PAIN DESCRIPTION - LOCATION: LOCATION: GENERALIZED

## 2024-03-17 ASSESSMENT — PAIN SCALES - GENERAL: PAINLEVEL_OUTOF10: 10

## 2024-03-17 NOTE — ED NOTES
I have reviewed discharge instructions with the patient.  The patient verbalized understanding.    Patient left ED via Discharge Method: ambulatory to Home with self transport  Opportunity for questions and clarification provided.       Patient given 1 scripts.         To continue your aftercare when you leave the hospital, you may receive an automated call from our care team to check in on how you are doing.  This is a free service and part of our promise to provide the best care and service to meet your aftercare needs.” If you have questions, or wish to unsubscribe from this service please call [phone].  Thank you for Choosing our [facility] Emergency Department.

## 2024-03-17 NOTE — ED PROVIDER NOTES
Emergency Department Provider Note       PCP: Isidro Camargo MD   Age: 43 y.o.   Sex: female     DISPOSITION Decision To Discharge 03/17/2024 07:08:49 PM       ICD-10-CM    1. Acute viral syndrome  B34.9           Medical Decision Making       Patient is a 43-year-old female who presents with cold cough congestion for the last few days as well as arthritis and bodyaches.  Patient does have a history of hypertension as well as asthma.    The history is provided by the patient.   Cough  Cough characteristics:  Non-productive  Sputum characteristics:  Nondescript  Severity:  Moderate  Onset quality:  Gradual  Duration:  1 week  Timing:  Intermittent  Progression:  Waxing and waning  Chronicity:  New  Smoker: no    Context: sick contacts    Relieved by:  Nothing  Worsened by:  Nothing  Ineffective treatments:  None tried  Associated symptoms: fever and myalgias    Associated symptoms: no chest pain, no headaches, no shortness of breath and no sinus congestion    Differential diagnosis includes was not limited to COVID, flu, strep, pneumonia.    Patient's physical exam is unremarkable and lungs are clear to auscultation bilaterally.    My independent interpretation of patient's chest x-ray is negative for pneumonia, pulmonary edema, pneumothorax.    Patient laboratory testing is unremarkable.  We will DC home with a prescription for Tessalon Perles.  Patient's vital signs are stable and O2 sats 98% on room air with respiratory rate of 18 patient looks well.  All questions answered.     1 or more acute illnesses that pose a threat to life or bodily function.   Shared medical decision making was utilized in creating the patients health plan today.    I independently ordered and reviewed each unique test.  I reviewed external records: provider visit note from PCP.     I interpreted the X-rays CXR NO PNA.              History     Patient is a 43-year-old female who presents with cold cough congestion for the last few

## 2024-03-18 LAB
BILIRUB UR QL: NEGATIVE
GLUCOSE UR QL STRIP.AUTO: NEGATIVE MG/DL
KETONES UR-MCNC: ABNORMAL MG/DL
LEUKOCYTE ESTERASE UR QL STRIP: NEGATIVE
NITRITE UR QL: NEGATIVE
PH UR: 6 (ref 5–9)
PROT UR QL: NEGATIVE MG/DL
RBC # UR STRIP: ABNORMAL
SP GR UR: 1.02 (ref 1–1.02)
UROBILINOGEN UR QL: 0.2 EU/DL (ref 0.2–1)

## 2024-04-12 ENCOUNTER — TRANSCRIBE ORDERS (OUTPATIENT)
Dept: SCHEDULING | Age: 44
End: 2024-04-12

## 2024-04-12 DIAGNOSIS — Z12.31 ENCOUNTER FOR SCREENING MAMMOGRAM FOR BREAST CANCER: Primary | ICD-10-CM

## 2024-05-10 ENCOUNTER — TELEPHONE (OUTPATIENT)
Dept: OBGYN CLINIC | Age: 44
End: 2024-05-10

## 2024-05-10 NOTE — TELEPHONE ENCOUNTER
Patient called with concerns of new growth in the same area where Dr. Durán had previously removed a growth. She is requesting a call back to discuss.

## 2024-05-10 NOTE — TELEPHONE ENCOUNTER
Spoke with patient who stated the \"growth\" she had removed in the fall has returned and is uncomfortable. Pt scheduled to be seen Monday for evaluation.

## 2024-05-13 NOTE — PROGRESS NOTES
The patient is a 43 y.o.  who is seen for The patient is a 43 y.o.  who is seen for painful growth in perineal area that she noticed approximately 3 weeks ago. Pt has only used vaseline on the area. She would like to discuss having this removed. Pt had biopsy done 23 and results as below. Pt was prescribed clobetasol but it was not covered by her insurance.  Not sexually active   Wants depo  Will send in Rx      Pt late on depo injection, needs HCG drawn today to restart.        A:  \"PERIANAL\":         SQUAMOUS EPITHELIUM HAVING FOCALLY DILATED HAIR FOLLICLE IN   ASSOCIATION WITH HYPERKERATOSIS AND FOCAL CHANGES OF REPAIR, NONDIAGNOSTIC   FOR MALIGNANT TUMOR AND SIGNIFICANT DYSPLASIA       HISTORY:      No LMP recorded. Patient has had an injection.    Current Outpatient Medications on File Prior to Visit   Medication Sig Dispense Refill    valACYclovir (VALTREX) 500 MG tablet Take 1 tablet by mouth daily 90 tablet 4    medroxyPROGESTERone (DEPO-PROVERA) 150 MG/ML injection Inject 1 mL into the muscle every 3 months 1 mL 3    zolpidem (AMBIEN CR) 12.5 MG extended release tablet TAKE 1 TABLET BY MOUTH ONCE A DAY AT BEDTIME AS NEEDED FOR 30 DAYS      LORazepam (ATIVAN) 1 MG tablet TAKE 1 TABLET BY MOUTH TWICE A DAY FOR 30 DAYS      lisinopril (PRINIVIL;ZESTRIL) 10 MG tablet Take 2 tablets by mouth daily      rosuvastatin (CRESTOR) 40 MG tablet TAKE 1 TABLET BY MOUTH EVERY DAY FOR 90 DAYS      albuterol sulfate  (90 Base) MCG/ACT inhaler 2 puffs as needed      ACETAMINOPHEN-BUTALBITAL  MG TABS 1 tablet as needed      dexlansoprazole (DEXILANT) 60 MG CPDR delayed release capsule Take by mouth daily      FLUoxetine (PROZAC) 40 MG capsule Take 60 mg by mouth daily      ibuprofen (ADVIL;MOTRIN) 800 MG tablet every 8 hours as needed      lurasidone (LATUDA) 40 MG TABS tablet Take 1 tablet by mouth daily (with breakfast)      propranolol (INDERAL) 20 MG tablet Take 2 tablets by mouth 2

## 2024-05-14 ENCOUNTER — OFFICE VISIT (OUTPATIENT)
Dept: OBGYN CLINIC | Age: 44
End: 2024-05-14
Payer: MEDICARE

## 2024-05-14 VITALS — DIASTOLIC BLOOD PRESSURE: 82 MMHG | WEIGHT: 149.1 LBS | BODY MASS INDEX: 26.41 KG/M2 | SYSTOLIC BLOOD PRESSURE: 130 MMHG

## 2024-05-14 DIAGNOSIS — N94.89 OTHER SPECIFIED CONDITIONS ASSOCIATED WITH FEMALE GENITAL ORGANS AND MENSTRUAL CYCLE: ICD-10-CM

## 2024-05-14 DIAGNOSIS — Z30.42 ENCOUNTER FOR SURVEILLANCE OF INJECTABLE CONTRACEPTIVE: ICD-10-CM

## 2024-05-14 DIAGNOSIS — Z78.9 USES DEPO-PROVERA AS PRIMARY BIRTH CONTROL METHOD: ICD-10-CM

## 2024-05-14 DIAGNOSIS — N90.4 VULVAR DYSTROPHY: ICD-10-CM

## 2024-05-14 DIAGNOSIS — Z78.9 USES DEPO-PROVERA AS PRIMARY BIRTH CONTROL METHOD: Primary | ICD-10-CM

## 2024-05-14 DIAGNOSIS — Z30.013 ENCOUNTER FOR INITIAL PRESCRIPTION OF INJECTABLE CONTRACEPTIVE: ICD-10-CM

## 2024-05-14 PROBLEM — Z30.019 ENCOUNTER FOR INITIAL PRESCRIPTION OF CONTRACEPTIVES: Status: ACTIVE | Noted: 2024-05-14

## 2024-05-14 LAB — HCG SERPL-ACNC: <1 MIU/ML

## 2024-05-14 PROCEDURE — G8419 CALC BMI OUT NRM PARAM NOF/U: HCPCS | Performed by: OBSTETRICS & GYNECOLOGY

## 2024-05-14 PROCEDURE — G8427 DOCREV CUR MEDS BY ELIG CLIN: HCPCS | Performed by: OBSTETRICS & GYNECOLOGY

## 2024-05-14 PROCEDURE — 99214 OFFICE O/P EST MOD 30 MIN: CPT | Performed by: OBSTETRICS & GYNECOLOGY

## 2024-05-14 PROCEDURE — 1036F TOBACCO NON-USER: CPT | Performed by: OBSTETRICS & GYNECOLOGY

## 2024-05-14 RX ORDER — MEDROXYPROGESTERONE ACETATE 150 MG/ML
150 INJECTION, SUSPENSION INTRAMUSCULAR
Qty: 1 ML | Refills: 3 | Status: SHIPPED | OUTPATIENT
Start: 2024-05-14

## 2024-06-12 ENCOUNTER — TELEPHONE (OUTPATIENT)
Dept: PULMONOLOGY | Age: 44
End: 2024-06-12

## 2024-06-12 NOTE — TELEPHONE ENCOUNTER
Note:  Outside referral from Dr. Faustina Marrufo for lung nodule, sob, abnormal weight loss and cough. Requested to PACS- CT chest PE 5/30/24 IMPRESSION:   1. No pulmonary embolus. No acute findings on this study.   2. Right apical ground-glass pulmonary nodule measuring 0.9 cm for which dedicated follow-up CT in 3-6 months is recommended, if clinically indicated.   Also requested to PACS CXR 5/30/24 5/7/20 7/18/18  Sending to triage for review.       Per chart never smoker. No prior chest imaging in [facility] or [facility] for comparison.  I have spoken with patient and she is scheduled to see Dr Galeano on 6/20 at 310 arrival.

## 2024-06-25 ENCOUNTER — OFFICE VISIT (OUTPATIENT)
Dept: PULMONOLOGY | Age: 44
End: 2024-06-25
Payer: MEDICARE

## 2024-06-25 VITALS
HEIGHT: 63 IN | BODY MASS INDEX: 26.22 KG/M2 | SYSTOLIC BLOOD PRESSURE: 118 MMHG | OXYGEN SATURATION: 98 % | HEART RATE: 60 BPM | DIASTOLIC BLOOD PRESSURE: 60 MMHG | WEIGHT: 148 LBS | RESPIRATION RATE: 20 BRPM | TEMPERATURE: 98 F

## 2024-06-25 DIAGNOSIS — J45.40 MODERATE PERSISTENT ASTHMA WITHOUT COMPLICATION: ICD-10-CM

## 2024-06-25 DIAGNOSIS — R06.02 SOB (SHORTNESS OF BREATH): ICD-10-CM

## 2024-06-25 DIAGNOSIS — R91.1 LUNG NODULE: ICD-10-CM

## 2024-06-25 DIAGNOSIS — M54.12 CERVICAL RADICULOPATHY: Primary | ICD-10-CM

## 2024-06-25 LAB — FENO: 6 PPB

## 2024-06-25 PROCEDURE — G8427 DOCREV CUR MEDS BY ELIG CLIN: HCPCS | Performed by: INTERNAL MEDICINE

## 2024-06-25 PROCEDURE — G8419 CALC BMI OUT NRM PARAM NOF/U: HCPCS | Performed by: INTERNAL MEDICINE

## 2024-06-25 PROCEDURE — 1036F TOBACCO NON-USER: CPT | Performed by: INTERNAL MEDICINE

## 2024-06-25 PROCEDURE — 99204 OFFICE O/P NEW MOD 45 MIN: CPT | Performed by: INTERNAL MEDICINE

## 2024-06-25 PROCEDURE — 95012 NITRIC OXIDE EXP GAS DETER: CPT | Performed by: INTERNAL MEDICINE

## 2024-06-25 RX ORDER — BUPROPION HYDROCHLORIDE 75 MG/1
75 TABLET ORAL 2 TIMES DAILY
COMMUNITY

## 2024-06-25 ASSESSMENT — PULMONARY FUNCTION TESTS: FENO: 6

## 2024-06-25 NOTE — PROGRESS NOTES
The patient is a 43 y.o.  who is seen for vaginal itching and white discharge that started approximately two weeks ago. Pt recently changed to a new detergent. Pt denies new sexual partners. Pt has not used any OTC treatments.    Pt seen 24 with reports of painful growth in perianal area. Pt requested to start Depo, quant drawn and <1. Pt did not come back for depo.    HISTORY:      No LMP recorded (lmp unknown). (Menstrual status: Other - See Notes).    Current Outpatient Medications on File Prior to Visit   Medication Sig Dispense Refill    buPROPion (WELLBUTRIN) 75 MG tablet Take 1 tablet by mouth 2 times daily      valACYclovir (VALTREX) 500 MG tablet Take 1 tablet by mouth daily 90 tablet 4    zolpidem (AMBIEN CR) 12.5 MG extended release tablet TAKE 1 TABLET BY MOUTH ONCE A DAY AT BEDTIME AS NEEDED FOR 30 DAYS      LORazepam (ATIVAN) 1 MG tablet TAKE 1 TABLET BY MOUTH TWICE A DAY FOR 30 DAYS      lisinopril (PRINIVIL;ZESTRIL) 10 MG tablet Take 2 tablets by mouth daily      rosuvastatin (CRESTOR) 40 MG tablet TAKE 1 TABLET BY MOUTH EVERY DAY FOR 90 DAYS      albuterol sulfate  (90 Base) MCG/ACT inhaler 2 puffs as needed      ACETAMINOPHEN-BUTALBITAL  MG TABS 1 tablet as needed      dexlansoprazole (DEXILANT) 60 MG CPDR delayed release capsule Take by mouth daily      FLUoxetine (PROZAC) 40 MG capsule Take 60 mg by mouth daily      ibuprofen (ADVIL;MOTRIN) 800 MG tablet every 8 hours as needed      lurasidone (LATUDA) 40 MG TABS tablet Take 1 tablet by mouth daily (with breakfast)      propranolol (INDERAL) 20 MG tablet Take 2 tablets by mouth 2 times daily      medroxyPROGESTERone (DEPO-PROVERA) 150 MG/ML injection Inject 1 mL into the muscle every 3 months (Patient not taking: Reported on 2024) 1 mL 3    clobetasol (TEMOVATE) 0.05 % ointment Apply topically to lesion four times daily x 1 week, then 3 times daily x 1 week, then 2 times daily x 1 week, then 1 time daily x 1

## 2024-06-25 NOTE — PROGRESS NOTES
Name:  Irene Tran  YOB: 1980   MRN: [id number]      Office Visit: 6/25/2024       Assessment & Plan    (Medical Decision Making)    Impression: 43 y.o. female with a history of never smoking, childhood asthma, seen for symptoms suggestive of asthma vs anxiety.   Incidentally discovered R apical ground glass nodule May 2024. This is very mild and just needs monitoring.     Also reports symptoms of pain shooting down her right shoulder and arm. Worried about nerve compression.     -repeat CT scan end of November to monitor nodule.    -refer to ortho for R hand numbness, shooting pain, eval for possible radiculopathy.   -FENO test today: 6  -cpft's 1st available.   -will start ICS after cPFT's are reviewed.     Follow up in 3 months.     1. Cervical radiculopathy    - [facility]    2. Lung nodule    - CT CHEST WO CONTRAST; Future    3. SOB (shortness of breath)    - AMB POC EXHALED NITRIC OXIDE    4. Moderate persistent asthma without complication      No orders of the defined types were placed in this encounter.    No orders of the defined types were placed in this encounter.    Follow-up and Dispositions    Return in 3 months (on 9/25/2024) for cPFT's 1st available, CT end of Nov. , With Me or [facility].       Norberto Tran MD      No specialty comments available.    No problem-specific Assessment & Plan notes found for this encounter.                _________________________________________________________________________    HISTORY OF PRESENT ILLNESS:    Ms. Irene Tran is a 43 y.o. female who is seen at [facility] today for  Shortness of Breath     She has a history of never smoking, asthma as a child, HTN, HLD, depression/anxiety.   Here for new pt to eval SOB, weight loss, and small lung nodule.     She states she was having some pain in her back, would go under her arm and into her hands. She was seen at ER and CT was performed. [place] like it may be

## 2024-06-26 ENCOUNTER — OFFICE VISIT (OUTPATIENT)
Dept: OBGYN CLINIC | Age: 44
End: 2024-06-26

## 2024-06-26 VITALS
SYSTOLIC BLOOD PRESSURE: 128 MMHG | DIASTOLIC BLOOD PRESSURE: 86 MMHG | BODY MASS INDEX: 27.12 KG/M2 | WEIGHT: 153.1 LBS | HEIGHT: 63 IN

## 2024-06-26 DIAGNOSIS — Z78.9 USES DEPO-PROVERA AS PRIMARY BIRTH CONTROL METHOD: ICD-10-CM

## 2024-06-26 DIAGNOSIS — N94.89 OTHER SPECIFIED CONDITIONS ASSOCIATED WITH FEMALE GENITAL ORGANS AND MENSTRUAL CYCLE: ICD-10-CM

## 2024-06-26 DIAGNOSIS — N89.8 VAGINAL ITCHING: Primary | ICD-10-CM

## 2024-06-26 DIAGNOSIS — K62.89 PAIN OF PERIANAL AREA: ICD-10-CM

## 2024-06-26 DIAGNOSIS — N89.8 VAGINAL DISCHARGE: ICD-10-CM

## 2024-06-26 DIAGNOSIS — K62.89 PERIANAL MASS: ICD-10-CM

## 2024-06-26 LAB
BACTERIA, WET MOUNT, POC: NORMAL
CLUE CELLS, WET MOUNT, POC: NORMAL
HCG SERPL-ACNC: <1 MIU/ML
RBC WET MOUNT, POC: NORMAL
TRICH, WET MOUNT, POC: NORMAL
WBC, WET MOUNT, POC: NORMAL
YEAST, WET MOUNT, POC: NORMAL

## 2024-06-27 ENCOUNTER — TELEPHONE (OUTPATIENT)
Dept: OBGYN CLINIC | Age: 44
End: 2024-06-27

## 2024-06-27 NOTE — TELEPHONE ENCOUNTER
Ok to start Depo per Dr Durán. Phoned patient and appointment scheduled. Pt aware that depo must be given within 7 days of Hcg draw. Pt notified to bring Depo to her appointment. Verbalized understanding.

## 2024-06-28 LAB
A VAGINAE DNA VAG QL NAA+PROBE: ABNORMAL SCORE
BVAB2 DNA VAG QL NAA+PROBE: ABNORMAL SCORE
C ALBICANS DNA VAG QL NAA+PROBE: NEGATIVE
C GLABRATA DNA VAG QL NAA+PROBE: NEGATIVE
C TRACH RRNA SPEC QL NAA+PROBE: NEGATIVE
MEGA1 DNA VAG QL NAA+PROBE: ABNORMAL SCORE
N GONORRHOEA RRNA SPEC QL NAA+PROBE: NEGATIVE
SPECIMEN SOURCE: ABNORMAL
T VAGINALIS RRNA SPEC QL NAA+PROBE: NEGATIVE

## 2024-07-01 ENCOUNTER — TELEPHONE (OUTPATIENT)
Dept: OBGYN CLINIC | Age: 44
End: 2024-07-01

## 2024-07-01 RX ORDER — METRONIDAZOLE 500 MG/1
500 TABLET ORAL 2 TIMES DAILY
Qty: 14 TABLET | Refills: 0 | Status: SHIPPED | OUTPATIENT
Start: 2024-07-01 | End: 2024-07-08

## 2024-07-01 NOTE — TELEPHONE ENCOUNTER
----- Message from Sabrina Glover MD sent at 6/28/2024  5:59 PM EDT -----  Flagyl 500 bid for 7 days  ----- Message -----  From: Kiki Rojas RN  Sent: 6/26/2024  11:29 AM EDT  To: Sabrina Glover MD

## 2024-08-05 ENCOUNTER — OFFICE VISIT (OUTPATIENT)
Age: 44
End: 2024-08-05
Payer: MEDICARE

## 2024-08-05 DIAGNOSIS — R20.2 NUMBNESS AND TINGLING OF RIGHT ARM: ICD-10-CM

## 2024-08-05 DIAGNOSIS — M47.812 ARTHROPATHY OF CERVICAL FACET JOINT: ICD-10-CM

## 2024-08-05 DIAGNOSIS — R20.0 NUMBNESS AND TINGLING OF RIGHT ARM: ICD-10-CM

## 2024-08-05 DIAGNOSIS — R20.2 NUMBNESS AND TINGLING OF LEFT UPPER EXTREMITY: ICD-10-CM

## 2024-08-05 DIAGNOSIS — M54.2 NECK PAIN: Primary | ICD-10-CM

## 2024-08-05 DIAGNOSIS — M50.30 DEGENERATIVE DISC DISEASE, CERVICAL: ICD-10-CM

## 2024-08-05 DIAGNOSIS — R20.0 NUMBNESS AND TINGLING OF LEFT UPPER EXTREMITY: ICD-10-CM

## 2024-08-05 PROCEDURE — 99214 OFFICE O/P EST MOD 30 MIN: CPT | Performed by: NURSE PRACTITIONER

## 2024-08-05 PROCEDURE — G8419 CALC BMI OUT NRM PARAM NOF/U: HCPCS | Performed by: NURSE PRACTITIONER

## 2024-08-05 PROCEDURE — G8428 CUR MEDS NOT DOCUMENT: HCPCS | Performed by: NURSE PRACTITIONER

## 2024-08-05 PROCEDURE — 1036F TOBACCO NON-USER: CPT | Performed by: NURSE PRACTITIONER

## 2024-08-05 RX ORDER — TIZANIDINE 4 MG/1
TABLET ORAL
COMMUNITY

## 2024-08-05 RX ORDER — BUTALBITAL, ACETAMINOPHEN AND CAFFEINE 50; 325; 40 MG/1; MG/1; MG/1
1 TABLET ORAL EVERY 6 HOURS PRN
COMMUNITY

## 2024-08-05 RX ORDER — HYDROXYZINE 50 MG/1
TABLET, FILM COATED ORAL
COMMUNITY
Start: 2024-01-23

## 2024-08-05 RX ORDER — OXYCODONE HYDROCHLORIDE 10 MG/1
TABLET ORAL
COMMUNITY
Start: 2024-07-15 | End: 2024-08-14

## 2024-08-05 RX ORDER — PROMETHAZINE HYDROCHLORIDE 12.5 MG/1
TABLET ORAL
COMMUNITY

## 2024-08-05 RX ORDER — TOPIRAMATE 25 MG/1
TABLET ORAL
COMMUNITY
Start: 2024-05-28

## 2024-08-05 RX ORDER — BUSPIRONE HYDROCHLORIDE 30 MG/1
TABLET ORAL
COMMUNITY
Start: 2024-07-16

## 2024-08-05 NOTE — PROGRESS NOTES
tablets by mouth 2 times daily, Disp: , Rfl:     medroxyPROGESTERone (DEPO-PROVERA) 150 MG/ML injection, Inject 1 mL into the muscle every 3 months (Patient not taking: Reported on 2024), Disp: 1 mL, Rfl: 3    clobetasol (TEMOVATE) 0.05 % ointment, Apply topically to lesion four times daily x 1 week, then 3 times daily x 1 week, then 2 times daily x 1 week, then 1 time daily x 1 week. (Patient not taking: Reported on 2024), Disp: 45 g, Rfl: 1    gabapentin (NEURONTIN) 100 MG capsule, Take 100 mg by mouth 3 times daily., Disp: , Rfl:     lurasidone (LATUDA) 40 MG TABS tablet, Take 1 tablet by mouth daily (with breakfast) (Patient not taking: Reported on 2024), Disp: , Rfl:   Past Surgical History:   Procedure Laterality Date    ANTERIOR CRUCIATE LIGAMENT REPAIR Right      DELIVERY ONLY      first pregnancy    CHOLECYSTECTOMY      PELVIC LAPAROSCOPY      laparoscopy for endometriosis    SINUS SURGERY PROC UNLISTED       Patient Active Problem List   Diagnosis    Chronic left shoulder pain     labor    Normal labor    Chronic back pain    Traumatic incomplete tear of left rotator cuff    Strain of long head of left biceps    Degenerative joint disease of left acromioclavicular joint    Vulvar dystrophy    Herpes simplex cervicitis    Encounter for initial prescription of contraceptives    Vaginal itching     Tobacco:  reports that she has never smoked. She has never used smokeless tobacco.  Alcohol:   Social History     Substance and Sexual Activity   Alcohol Use No        Physical Exam:   BMI: There is no height or weight on file to calculate BMI.    GENERAL:  Adult in no acute distress, well developed, well nourished . Patient is appropriately conversant  CERVICAL SPINE:  Inspection of the neck reveals no evidence of rash or skin lesion.  Examination of the cervical spine reveals no evidence of sagittal or coronal plane deformity, decreased ROM, and palpable tenderness  Spurling's sign is

## 2024-08-16 ENCOUNTER — NURSE ONLY (OUTPATIENT)
Dept: PULMONOLOGY | Age: 44
End: 2024-08-16

## 2024-08-16 DIAGNOSIS — R06.02 SOB (SHORTNESS OF BREATH): Primary | ICD-10-CM

## 2024-08-16 LAB
FEV 1 , POC: 2.49 L
FEV1 % PRED, POC: 88 %
FEV1/FVC, POC: NORMAL
FVC % PRED, POC: 81 %
FVC, POC: NORMAL

## 2024-08-16 ASSESSMENT — PULMONARY FUNCTION TESTS
FVC_PERCENT_PREDICTED_POC: 81
FEV1_PERCENT_PREDICTED_POC: 88

## 2024-08-16 NOTE — PROGRESS NOTES
CPFT complete.  Pt gave good effort, but was unable to achieve expiratory time requirement necessary for acceptable and reproducible spirometry.

## 2024-08-17 NOTE — RESULT ENCOUNTER NOTE
Can you let the patient know that there was some reduction in her breathing test results. This abnormality makes me want to rule out any cardiac abnormalities contributing to her shortness of breath. To evaluate this can we set her up for a TTE? Thank you.     Norberto Tran MD

## 2024-08-20 ENCOUNTER — TELEPHONE (OUTPATIENT)
Dept: PULMONOLOGY | Age: 44
End: 2024-08-20

## 2024-08-20 DIAGNOSIS — R06.02 SOB (SHORTNESS OF BREATH): Primary | ICD-10-CM

## 2024-08-20 NOTE — TELEPHONE ENCOUNTER
Tried contacting patient but was not successful in reaching them.  I could not leave a voicemail due to their voicemail not being set up.  Will try contacting the patient again later. // Barbara GAYLE

## 2024-08-20 NOTE — TELEPHONE ENCOUNTER
----- Message from Dr. Norberto Tran MD sent at 8/17/2024  2:24 PM EDT -----  Can you let the patient know that there was some reduction in her breathing test results. This abnormality makes me want to rule out any cardiac abnormalities contributing to her shortness of breath. To evaluate this can we set her up for a TTE? Thank you.     Norberto Tran MD

## 2024-08-21 NOTE — TELEPHONE ENCOUNTER
Spoke with the patient in regards to their CPFT results, explained per Dr. Tran that the breathing tests showed some reduction and based on these results Dr. Tran would like to set up a TTE to further evaluate any cardiac abnormalities contributing to her shortness of breath.  Patient verbalized understanding of her results and was agreeable to have the TTE done.  Referral has been sent to [facility] for the TTE and no further questions or concerns were asked at this time. // Barbara Carranza M.A.

## 2024-09-10 ENCOUNTER — OFFICE VISIT (OUTPATIENT)
Age: 44
End: 2024-09-10
Payer: MEDICARE

## 2024-09-10 DIAGNOSIS — M47.812 ARTHROPATHY OF CERVICAL FACET JOINT: ICD-10-CM

## 2024-09-10 DIAGNOSIS — G56.03 CARPAL TUNNEL SYNDROME, BILATERAL: Primary | ICD-10-CM

## 2024-09-10 DIAGNOSIS — M50.30 DEGENERATIVE DISC DISEASE, CERVICAL: ICD-10-CM

## 2024-09-10 PROCEDURE — 1036F TOBACCO NON-USER: CPT | Performed by: NURSE PRACTITIONER

## 2024-09-10 PROCEDURE — G8419 CALC BMI OUT NRM PARAM NOF/U: HCPCS | Performed by: NURSE PRACTITIONER

## 2024-09-10 PROCEDURE — G8428 CUR MEDS NOT DOCUMENT: HCPCS | Performed by: NURSE PRACTITIONER

## 2024-09-10 PROCEDURE — 99213 OFFICE O/P EST LOW 20 MIN: CPT | Performed by: NURSE PRACTITIONER

## 2024-09-12 ENCOUNTER — HOSPITAL ENCOUNTER (EMERGENCY)
Age: 44
Discharge: HOME | End: 2024-09-12
Payer: MEDICARE

## 2024-09-12 ENCOUNTER — APPOINTMENT (OUTPATIENT)
Dept: GENERAL RADIOLOGY | Age: 44
End: 2024-09-12
Payer: MEDICARE

## 2024-09-12 ENCOUNTER — APPOINTMENT (OUTPATIENT)
Dept: CT IMAGING | Age: 44
End: 2024-09-12
Payer: MEDICARE

## 2024-09-12 VITALS
TEMPERATURE: 98.1 F | SYSTOLIC BLOOD PRESSURE: 124 MMHG | HEART RATE: 82 BPM | HEIGHT: 63 IN | WEIGHT: 140 LBS | RESPIRATION RATE: 14 BRPM | OXYGEN SATURATION: 100 % | BODY MASS INDEX: 24.8 KG/M2 | DIASTOLIC BLOOD PRESSURE: 78 MMHG

## 2024-09-12 DIAGNOSIS — E86.0 DEHYDRATION: ICD-10-CM

## 2024-09-12 DIAGNOSIS — R10.13 ABDOMINAL PAIN, EPIGASTRIC: Primary | ICD-10-CM

## 2024-09-12 DIAGNOSIS — N83.201 OVARIAN CYST, RIGHT: ICD-10-CM

## 2024-09-12 LAB
ALBUMIN SERPL-MCNC: 3.7 G/DL (ref 3.5–5)
ALBUMIN/GLOB SERPL: 1.3 (ref 1–1.9)
ALP SERPL-CCNC: 67 U/L (ref 35–104)
ALT SERPL-CCNC: 34 U/L (ref 12–65)
ANION GAP SERPL CALC-SCNC: 12 MMOL/L (ref 9–18)
AST SERPL-CCNC: 23 U/L (ref 15–37)
BASOPHILS # BLD: 0 K/UL (ref 0–0.2)
BASOPHILS NFR BLD: 0 % (ref 0–2)
BILIRUB SERPL-MCNC: 0.2 MG/DL (ref 0–1.2)
BILIRUB UR QL: NEGATIVE
BUN SERPL-MCNC: 21 MG/DL (ref 6–23)
CALCIUM SERPL-MCNC: 8.9 MG/DL (ref 8.8–10.2)
CHLORIDE SERPL-SCNC: 104 MMOL/L (ref 98–107)
CO2 SERPL-SCNC: 22 MMOL/L (ref 20–28)
CREAT SERPL-MCNC: 1.27 MG/DL (ref 0.6–1.1)
D DIMER PPP FEU-MCNC: <0.27 UG/ML(FEU)
DIFFERENTIAL METHOD BLD: ABNORMAL
EKG ATRIAL RATE: 57 BPM
EKG DIAGNOSIS: NORMAL
EKG P AXIS: 60 DEGREES
EKG P-R INTERVAL: 171 MS
EKG Q-T INTERVAL: 437 MS
EKG QRS DURATION: 97 MS
EKG QTC CALCULATION (BAZETT): 426 MS
EKG R AXIS: 73 DEGREES
EKG T AXIS: 50 DEGREES
EKG VENTRICULAR RATE: 57 BPM
EOSINOPHIL # BLD: 0.3 K/UL (ref 0–0.8)
EOSINOPHIL NFR BLD: 4 % (ref 0.5–7.8)
ERYTHROCYTE [DISTWIDTH] IN BLOOD BY AUTOMATED COUNT: 12.8 % (ref 11.9–14.6)
GLOBULIN SER CALC-MCNC: 2.9 G/DL (ref 2.3–3.5)
GLUCOSE SERPL-MCNC: 90 MG/DL (ref 70–99)
GLUCOSE UR QL STRIP.AUTO: NEGATIVE MG/DL
HCG UR QL: NEGATIVE
HCT VFR BLD AUTO: 35.1 % (ref 35.8–46.3)
HGB BLD-MCNC: 12.1 G/DL (ref 11.7–15.4)
IMM GRANULOCYTES # BLD AUTO: 0 K/UL (ref 0–0.5)
IMM GRANULOCYTES NFR BLD AUTO: 0 % (ref 0–5)
KETONES UR-MCNC: NEGATIVE MG/DL
LEUKOCYTE ESTERASE UR QL STRIP: NEGATIVE
LIPASE SERPL-CCNC: 69 U/L (ref 13–60)
LYMPHOCYTES # BLD: 3.4 K/UL (ref 0.5–4.6)
LYMPHOCYTES NFR BLD: 55 % (ref 13–44)
MAGNESIUM SERPL-MCNC: 2.3 MG/DL (ref 1.8–2.4)
MCH RBC QN AUTO: 29.6 PG (ref 26.1–32.9)
MCHC RBC AUTO-ENTMCNC: 34.5 G/DL (ref 31.4–35)
MCV RBC AUTO: 85.8 FL (ref 82–102)
MONOCYTES # BLD: 0.4 K/UL (ref 0.1–1.3)
MONOCYTES NFR BLD: 7 % (ref 4–12)
NEUTS SEG # BLD: 2 K/UL (ref 1.7–8.2)
NEUTS SEG NFR BLD: 33 % (ref 43–78)
NITRITE UR QL: NEGATIVE
NRBC # BLD: 0 K/UL (ref 0–0.2)
PH UR: 5.5 (ref 5–9)
PLATELET # BLD AUTO: 316 K/UL (ref 150–450)
PMV BLD AUTO: 8.7 FL (ref 9.4–12.3)
POTASSIUM SERPL-SCNC: 4.2 MMOL/L (ref 3.5–5.1)
PROT SERPL-MCNC: 6.6 G/DL (ref 6.3–8.2)
PROT UR QL: NEGATIVE MG/DL
RBC # BLD AUTO: 4.09 M/UL (ref 4.05–5.2)
RBC # UR STRIP: NEGATIVE
SARS-COV-2 RDRP RESP QL NAA+PROBE: NOT DETECTED
SERVICE CMNT-IMP: NORMAL
SODIUM SERPL-SCNC: 138 MMOL/L (ref 136–145)
SOURCE: NORMAL
SP GR UR: 1.01 (ref 1–1.02)
TROPONIN T SERPL HS-MCNC: <6 NG/L (ref 0–14)
TROPONIN T SERPL HS-MCNC: <6 NG/L (ref 0–14)
UROBILINOGEN UR QL: 0.2 EU/DL (ref 0.2–1)
WBC # BLD AUTO: 6.1 K/UL (ref 4.3–11.1)

## 2024-09-12 PROCEDURE — 93005 ELECTROCARDIOGRAM TRACING: CPT | Performed by: PHYSICIAN ASSISTANT

## 2024-09-12 PROCEDURE — 96361 HYDRATE IV INFUSION ADD-ON: CPT

## 2024-09-12 PROCEDURE — 6370000000 HC RX 637 (ALT 250 FOR IP): Performed by: PHYSICIAN ASSISTANT

## 2024-09-12 PROCEDURE — 2580000003 HC RX 258: Performed by: PHYSICIAN ASSISTANT

## 2024-09-12 PROCEDURE — 96374 THER/PROPH/DIAG INJ IV PUSH: CPT

## 2024-09-12 PROCEDURE — 81003 URINALYSIS AUTO W/O SCOPE: CPT

## 2024-09-12 PROCEDURE — 2500000003 HC RX 250 WO HCPCS: Performed by: PHYSICIAN ASSISTANT

## 2024-09-12 PROCEDURE — 6360000004 HC RX CONTRAST MEDICATION: Performed by: PHYSICIAN ASSISTANT

## 2024-09-12 PROCEDURE — 93010 ELECTROCARDIOGRAM REPORT: CPT | Performed by: INTERNAL MEDICINE

## 2024-09-12 PROCEDURE — 81025 URINE PREGNANCY TEST: CPT

## 2024-09-12 PROCEDURE — 96375 TX/PRO/DX INJ NEW DRUG ADDON: CPT

## 2024-09-12 PROCEDURE — 84484 ASSAY OF TROPONIN QUANT: CPT

## 2024-09-12 PROCEDURE — 83690 ASSAY OF LIPASE: CPT

## 2024-09-12 PROCEDURE — 80053 COMPREHEN METABOLIC PANEL: CPT

## 2024-09-12 PROCEDURE — 71046 X-RAY EXAM CHEST 2 VIEWS: CPT

## 2024-09-12 PROCEDURE — 99285 EMERGENCY DEPT VISIT HI MDM: CPT

## 2024-09-12 PROCEDURE — 85025 COMPLETE CBC W/AUTO DIFF WBC: CPT

## 2024-09-12 PROCEDURE — 87635 SARS-COV-2 COVID-19 AMP PRB: CPT

## 2024-09-12 PROCEDURE — 83735 ASSAY OF MAGNESIUM: CPT

## 2024-09-12 PROCEDURE — 6360000002 HC RX W HCPCS: Performed by: PHYSICIAN ASSISTANT

## 2024-09-12 PROCEDURE — 85379 FIBRIN DEGRADATION QUANT: CPT

## 2024-09-12 PROCEDURE — 74177 CT ABD & PELVIS W/CONTRAST: CPT

## 2024-09-12 RX ORDER — KETOROLAC TROMETHAMINE 30 MG/ML
30 INJECTION, SOLUTION INTRAMUSCULAR; INTRAVENOUS
Status: COMPLETED | OUTPATIENT
Start: 2024-09-12 | End: 2024-09-12

## 2024-09-12 RX ORDER — ONDANSETRON 2 MG/ML
4 INJECTION INTRAMUSCULAR; INTRAVENOUS
Status: COMPLETED | OUTPATIENT
Start: 2024-09-12 | End: 2024-09-12

## 2024-09-12 RX ORDER — IOPAMIDOL 755 MG/ML
100 INJECTION, SOLUTION INTRAVASCULAR
Status: COMPLETED | OUTPATIENT
Start: 2024-09-12 | End: 2024-09-12

## 2024-09-12 RX ORDER — SODIUM CHLORIDE, SODIUM LACTATE, POTASSIUM CHLORIDE, AND CALCIUM CHLORIDE .6; .31; .03; .02 G/100ML; G/100ML; G/100ML; G/100ML
1000 INJECTION, SOLUTION INTRAVENOUS ONCE
Status: COMPLETED | OUTPATIENT
Start: 2024-09-12 | End: 2024-09-12

## 2024-09-12 RX ORDER — FAMOTIDINE 20 MG/1
20 TABLET, FILM COATED ORAL 2 TIMES DAILY
Qty: 60 TABLET | Refills: 0 | Status: SHIPPED | OUTPATIENT
Start: 2024-09-12

## 2024-09-12 RX ADMIN — HYOSCYAMINE SULFATE 0.25 MG: 0.12 TABLET ORAL; SUBLINGUAL at 15:04

## 2024-09-12 RX ADMIN — IOPAMIDOL 100 ML: 755 INJECTION, SOLUTION INTRAVENOUS at 13:37

## 2024-09-12 RX ADMIN — ONDANSETRON 4 MG: 2 INJECTION INTRAMUSCULAR; INTRAVENOUS at 15:05

## 2024-09-12 RX ADMIN — FAMOTIDINE 20 MG: 10 INJECTION, SOLUTION INTRAVENOUS at 09:52

## 2024-09-12 RX ADMIN — SODIUM CHLORIDE, POTASSIUM CHLORIDE, SODIUM LACTATE AND CALCIUM CHLORIDE 1000 ML: 600; 310; 30; 20 INJECTION, SOLUTION INTRAVENOUS at 09:51

## 2024-09-12 RX ADMIN — KETOROLAC TROMETHAMINE 30 MG: 30 INJECTION, SOLUTION INTRAMUSCULAR at 15:05

## 2024-09-12 ASSESSMENT — PAIN SCALES - GENERAL
PAINLEVEL_OUTOF10: 9

## 2024-09-12 ASSESSMENT — PAIN DESCRIPTION - LOCATION
LOCATION: CHEST
LOCATION: ABDOMEN;HEAD
LOCATION: ABDOMEN;HEAD

## 2024-09-12 ASSESSMENT — PAIN - FUNCTIONAL ASSESSMENT: PAIN_FUNCTIONAL_ASSESSMENT: 0-10

## 2024-09-12 ASSESSMENT — LIFESTYLE VARIABLES
HOW MANY STANDARD DRINKS CONTAINING ALCOHOL DO YOU HAVE ON A TYPICAL DAY: 1 OR 2
HOW OFTEN DO YOU HAVE A DRINK CONTAINING ALCOHOL: MONTHLY OR LESS

## 2024-09-12 ASSESSMENT — PAIN DESCRIPTION - DESCRIPTORS
DESCRIPTORS: ACHING
DESCRIPTORS: ACHING

## 2024-10-07 NOTE — PROGRESS NOTES
The patient is a 44 y.o.  who is seen for US secondary to pelvic pain. Pt was seen in ER 24  for chest pain to back and stomach and had CT scan completed. Pt reports postcoital bleeding and burning during intercourse.  Pt reports some spotting in September but unsure of LMP; reports having irregular cycles.  Has had some cramping      Pt would like to start Depo injections.    US findings from today:  Enlarged Uterus = 6wks   Endo = 2mm and appears normal   Rt Ovary appears normal   Lt Ovary has 5.1/3.4/4.2cm complex hemorrhagic cyst filled with debris. Normal blood flow noted to the ovary.   Small amount of free fluid seen   Uterine volume:99.35   small 1.7 cm simple cyst- I feel like this is a different cyst  24 CT Abdomen  No acute findings in the visualized lower thorax. The patient is status post  cholecystectomy. No acute finding of the solid abdominal organs. Evaluation of  bowel demonstrates no evidence of obstruction or inflammatory changes. Moderate  retained stool. Normal appendix. No free fluid nor free air. The urinary bladder  is distended without surrounding inflammatory changes. There appears to be  asymmetric enlargement of the right ovary relative to the left without  surrounding inflammatory changes. . No acute osseous nor soft tissue    Suggestion of asymmetric enlargement of the right ovary relative to  the left without surrounding inflammatory changes.    HISTORY:      No LMP recorded (lmp unknown). (Menstrual status: Irregular periods).    Current Outpatient Medications on File Prior to Visit   Medication Sig Dispense Refill    famotidine (PEPCID) 20 MG tablet Take 1 tablet by mouth 2 times daily 60 tablet 0    busPIRone (BUSPAR) 30 MG tablet       butalbital-acetaminophen-caffeine (FIORICET, ESGIC) -40 MG per tablet Take 1 tablet by mouth every 6 hours as needed      hydrOXYzine HCl (ATARAX) 50 MG tablet TAKE 1-2 TABLET BY MOUTH EVERY EIGHT HOURS AS NEEDED

## 2024-10-10 ENCOUNTER — OFFICE VISIT (OUTPATIENT)
Dept: OBGYN CLINIC | Age: 44
End: 2024-10-10
Payer: MEDICARE

## 2024-10-10 ENCOUNTER — PROCEDURE VISIT (OUTPATIENT)
Dept: OBGYN CLINIC | Age: 44
End: 2024-10-10
Payer: MEDICARE

## 2024-10-10 VITALS
BODY MASS INDEX: 27.29 KG/M2 | DIASTOLIC BLOOD PRESSURE: 84 MMHG | HEIGHT: 63 IN | WEIGHT: 154 LBS | SYSTOLIC BLOOD PRESSURE: 142 MMHG

## 2024-10-10 DIAGNOSIS — Z30.013 ENCOUNTER FOR INITIAL PRESCRIPTION OF INJECTABLE CONTRACEPTIVE: ICD-10-CM

## 2024-10-10 DIAGNOSIS — R10.2 PELVIC AND PERINEAL PAIN: ICD-10-CM

## 2024-10-10 DIAGNOSIS — N83.201 OVARIAN CYST, RIGHT: ICD-10-CM

## 2024-10-10 DIAGNOSIS — R10.2 PELVIC PAIN: Primary | ICD-10-CM

## 2024-10-10 DIAGNOSIS — A60.03 HERPES SIMPLEX CERVICITIS: Primary | ICD-10-CM

## 2024-10-10 LAB — HCG SERPL-ACNC: <1 MIU/ML

## 2024-10-10 PROCEDURE — G8427 DOCREV CUR MEDS BY ELIG CLIN: HCPCS | Performed by: OBSTETRICS & GYNECOLOGY

## 2024-10-10 PROCEDURE — 1036F TOBACCO NON-USER: CPT | Performed by: OBSTETRICS & GYNECOLOGY

## 2024-10-10 PROCEDURE — 76830 TRANSVAGINAL US NON-OB: CPT | Performed by: OBSTETRICS & GYNECOLOGY

## 2024-10-10 PROCEDURE — 99214 OFFICE O/P EST MOD 30 MIN: CPT | Performed by: OBSTETRICS & GYNECOLOGY

## 2024-10-10 PROCEDURE — G8484 FLU IMMUNIZE NO ADMIN: HCPCS | Performed by: OBSTETRICS & GYNECOLOGY

## 2024-10-10 PROCEDURE — G8419 CALC BMI OUT NRM PARAM NOF/U: HCPCS | Performed by: OBSTETRICS & GYNECOLOGY

## 2024-10-10 RX ORDER — VALACYCLOVIR HYDROCHLORIDE 1 G/1
TABLET, FILM COATED ORAL
Qty: 30 TABLET | Refills: 2 | Status: SHIPPED | OUTPATIENT
Start: 2024-10-10

## 2024-10-30 ENCOUNTER — TELEPHONE (OUTPATIENT)
Dept: GASTROENTEROLOGY | Age: 44
End: 2024-10-30

## 2024-10-30 NOTE — TELEPHONE ENCOUNTER
Patient left a voicemail requesting to reschedule her appointment that is with Ivy on 10/31 at 3:00 PM she has another appointment with her PCP at the same time.     Returned patient's call, no answer. Voicemail not set up. Let appointment on schedule for now as I do not see another appointment for her for tomorrow.

## 2024-11-11 ENCOUNTER — OFFICE VISIT (OUTPATIENT)
Dept: PULMONOLOGY | Age: 44
End: 2024-11-11
Payer: MEDICARE

## 2024-11-11 VITALS
TEMPERATURE: 98 F | DIASTOLIC BLOOD PRESSURE: 80 MMHG | HEART RATE: 60 BPM | HEIGHT: 64 IN | RESPIRATION RATE: 20 BRPM | OXYGEN SATURATION: 99 % | BODY MASS INDEX: 26.63 KG/M2 | WEIGHT: 156 LBS | SYSTOLIC BLOOD PRESSURE: 120 MMHG

## 2024-11-11 DIAGNOSIS — R05.3 CHRONIC COUGH: Primary | ICD-10-CM

## 2024-11-11 DIAGNOSIS — T46.4X5A ADVERSE EFFECT OF LISINOPRIL, INITIAL ENCOUNTER: ICD-10-CM

## 2024-11-11 DIAGNOSIS — R91.1 LUNG NODULE: ICD-10-CM

## 2024-11-11 DIAGNOSIS — J45.40 MODERATE PERSISTENT ASTHMA WITHOUT COMPLICATION: ICD-10-CM

## 2024-11-11 DIAGNOSIS — R94.2 DIFFUSION CAPACITY OF LUNG (DL), DECREASED: ICD-10-CM

## 2024-11-11 PROCEDURE — 99214 OFFICE O/P EST MOD 30 MIN: CPT | Performed by: INTERNAL MEDICINE

## 2024-11-11 PROCEDURE — G8484 FLU IMMUNIZE NO ADMIN: HCPCS | Performed by: INTERNAL MEDICINE

## 2024-11-11 PROCEDURE — G8427 DOCREV CUR MEDS BY ELIG CLIN: HCPCS | Performed by: INTERNAL MEDICINE

## 2024-11-11 PROCEDURE — 1036F TOBACCO NON-USER: CPT | Performed by: INTERNAL MEDICINE

## 2024-11-11 PROCEDURE — G8419 CALC BMI OUT NRM PARAM NOF/U: HCPCS | Performed by: INTERNAL MEDICINE

## 2024-11-11 RX ORDER — ALBUTEROL SULFATE 90 UG/1
2 INHALANT RESPIRATORY (INHALATION) EVERY 6 HOURS PRN
Qty: 1 EACH | Refills: 11 | Status: SHIPPED | OUTPATIENT
Start: 2024-11-11

## 2024-11-11 RX ORDER — MOMETASONE FUROATE 200 UG/1
1 AEROSOL RESPIRATORY (INHALATION) 2 TIMES DAILY
Qty: 1 EACH | Refills: 11 | Status: SHIPPED | OUTPATIENT
Start: 2024-11-11

## 2024-11-11 NOTE — PROGRESS NOTES
Name:  Irene Tran  YOB: 1980   MRN: [id number]      Office Visit: 11/11/2024       Assessment & Plan    (Medical Decision Making)    Impression: 44 y.o. female with a history of never smoking, childhood asthma, seen for symptoms suggestive of asthma vs anxiety.   Incidentally discovered R apical ground glass nodule May 2024. This is very mild and just needs monitoring.   cPFT\"s show significantly reduced DLCO with otherwise normal values.   Cough is her main complaint at present. On lisinopril and no symptoms of poorly controlled GERD or post nasal drip.     -Due for repeat CT scan to monitor her lung nodule.  Will order.  -Given her isolated reduction in diffusion capacity will perform echocardiogram to ensure there is no signs of pulmonary hypertension or other abnormalities.  -Will start her on inhaled corticosteroids, her insurance prefers Asmanex.  -She has as needed albuterol but does not glean much benefit from this.  -If the inhaler does not help we should next take her off of lisinopril.  She states that her blood pressure is poorly controlled off of this medication so I will not stop at present I will inform her primary care doctor to help follow-up on this and suggest alternatives.  -If none of this helps we can consider medications targeting either postnasal drip or GERD though these are both currently well-controlled per her report.      F/u in 3 months.     1. Chronic cough    2. Moderate persistent asthma without complication    3. Lung nodule    4. Diffusion capacity of lung (dl), decreased    5. Adverse effect of lisinopril, initial encounter      No orders of the defined types were placed in this encounter.    No orders of the defined types were placed in this encounter.        Norberto Tran MD      No specialty comments available.    No problem-specific Assessment & Plan notes found for this encounter.

## 2024-11-11 NOTE — PATIENT INSTRUCTIONS
If you do not hear from Radiology Scheduling within 1 week please call the number below:    [facility]  P: [phone]

## 2025-01-27 ENCOUNTER — PROCEDURE VISIT (OUTPATIENT)
Dept: OBGYN CLINIC | Age: 45
End: 2025-01-27
Payer: MEDICARE

## 2025-01-27 ENCOUNTER — OFFICE VISIT (OUTPATIENT)
Dept: OBGYN CLINIC | Age: 45
End: 2025-01-27
Payer: MEDICARE

## 2025-01-27 VITALS
DIASTOLIC BLOOD PRESSURE: 86 MMHG | WEIGHT: 165.3 LBS | SYSTOLIC BLOOD PRESSURE: 136 MMHG | BODY MASS INDEX: 28.22 KG/M2 | HEIGHT: 64 IN

## 2025-01-27 DIAGNOSIS — R10.2 PELVIC PAIN: ICD-10-CM

## 2025-01-27 DIAGNOSIS — R10.2 PELVIC PAIN: Primary | ICD-10-CM

## 2025-01-27 DIAGNOSIS — Z32.00 ENCOUNTER FOR PREGNANCY TEST, RESULT UNKNOWN: ICD-10-CM

## 2025-01-27 DIAGNOSIS — N83.201 OVARIAN CYST, RIGHT: Primary | ICD-10-CM

## 2025-01-27 DIAGNOSIS — R30.0 BURNING WITH URINATION: ICD-10-CM

## 2025-01-27 DIAGNOSIS — N83.202 CYST OF LEFT OVARY: ICD-10-CM

## 2025-01-27 LAB
BILIRUBIN, URINE, POC: NEGATIVE
BLOOD URINE, POC: NEGATIVE
GLUCOSE URINE, POC: NEGATIVE
HCG SERPL-ACNC: <1 MIU/ML
KETONES, URINE, POC: NEGATIVE
LEUKOCYTE ESTERASE, URINE, POC: NEGATIVE
NITRITE, URINE, POC: NEGATIVE
PH, URINE, POC: 5.5 (ref 4.6–8)
PROTEIN,URINE, POC: NEGATIVE
SPECIFIC GRAVITY, URINE, POC: 1.01 (ref 1–1.03)
URINALYSIS CLARITY, POC: CLEAR
URINALYSIS COLOR, POC: YELLOW
UROBILINOGEN, POC: NORMAL MG/DL

## 2025-01-27 PROCEDURE — G8427 DOCREV CUR MEDS BY ELIG CLIN: HCPCS | Performed by: OBSTETRICS & GYNECOLOGY

## 2025-01-27 PROCEDURE — 81002 URINALYSIS NONAUTO W/O SCOPE: CPT | Performed by: OBSTETRICS & GYNECOLOGY

## 2025-01-27 PROCEDURE — 1036F TOBACCO NON-USER: CPT | Performed by: OBSTETRICS & GYNECOLOGY

## 2025-01-27 PROCEDURE — 99214 OFFICE O/P EST MOD 30 MIN: CPT | Performed by: OBSTETRICS & GYNECOLOGY

## 2025-01-27 PROCEDURE — 76830 TRANSVAGINAL US NON-OB: CPT | Performed by: OBSTETRICS & GYNECOLOGY

## 2025-01-27 PROCEDURE — G8419 CALC BMI OUT NRM PARAM NOF/U: HCPCS | Performed by: OBSTETRICS & GYNECOLOGY

## 2025-01-27 RX ORDER — MEDROXYPROGESTERONE ACETATE 150 MG/ML
150 INJECTION, SUSPENSION INTRAMUSCULAR
Qty: 1 ML | Refills: 3 | Status: SHIPPED | OUTPATIENT
Start: 2025-01-27

## 2025-01-27 RX ORDER — LOSARTAN POTASSIUM 50 MG/1
TABLET ORAL
COMMUNITY
Start: 2024-12-26

## 2025-01-27 NOTE — PROGRESS NOTES
The patient is a 44 y.o.  who is seen for a follow up visit with ultrasound from 10/10/24 to f/u on hemorrhagic cyst. Admits to lower abdominal cramping/pain. Not unilateral. Denies any constipation. Has noticed burning with urination x 1 month. Has some irritation but no changes in discharge or odor.  Has not done depo yet  Still does not have depo-encouraged as she still is having cramping and was told not to use nsaids for pain sec to htn    Ultrasound Findings Today:  Enlarged Uterus = 6wks   Endo = 2.1mm and appears normal   Rt Ovary has simple follicle vs cyst that measures 2.3/1.6/2.1cm   Lt Ovary appears normal with previously seen hemorrhagic cyst resolved.   No free fluid seen     US findings from 10/10/24:  Enlarged Uterus = 6wks   Endo = 2mm and appears normal   Rt Ovary appears normal   Lt Ovary has 5.1/3.4/4.2cm complex hemorrhagic cyst filled with debris. Normal blood flow noted to the ovary.   Small amount of free fluid seen   Uterine volume:99.35  US  small 1.7 cm simple cyst- I feel like this is a different cyst    24 CT Abdomen  No acute findings in the visualized lower thorax. The patient is status post  cholecystectomy. No acute finding of the solid abdominal organs. Evaluation of  bowel demonstrates no evidence of obstruction or inflammatory changes. Moderate  retained stool. Normal appendix. No free fluid nor free air. The urinary bladder  is distended without surrounding inflammatory changes. There appears to be  asymmetric enlargement of the right ovary relative to the left without  surrounding inflammatory changes. . No acute osseous nor soft tissue     Suggestion of asymmetric enlargement of the right ovary relative to  the left without surrounding inflammatory changes.        HISTORY:      Patient's last menstrual period was 2024 (approximate).  Sexual History:  not sexually active  Contraception:  none  Current Outpatient Medications on File Prior to Visit

## 2025-02-19 ENCOUNTER — OFFICE VISIT (OUTPATIENT)
Dept: PULMONOLOGY | Age: 45
End: 2025-02-19
Payer: MEDICARE

## 2025-02-19 VITALS
OXYGEN SATURATION: 97 % | HEIGHT: 63 IN | RESPIRATION RATE: 20 BRPM | WEIGHT: 165 LBS | BODY MASS INDEX: 29.23 KG/M2 | DIASTOLIC BLOOD PRESSURE: 80 MMHG | TEMPERATURE: 98 F | SYSTOLIC BLOOD PRESSURE: 130 MMHG | HEART RATE: 74 BPM

## 2025-02-19 DIAGNOSIS — R05.3 CHRONIC COUGH: Primary | ICD-10-CM

## 2025-02-19 DIAGNOSIS — J45.40 MODERATE PERSISTENT ASTHMA WITHOUT COMPLICATION: ICD-10-CM

## 2025-02-19 DIAGNOSIS — R91.1 LUNG NODULE: ICD-10-CM

## 2025-02-19 DIAGNOSIS — R94.2 DIFFUSION CAPACITY OF LUNG (DL), DECREASED: ICD-10-CM

## 2025-02-19 PROCEDURE — G2211 COMPLEX E/M VISIT ADD ON: HCPCS | Performed by: INTERNAL MEDICINE

## 2025-02-19 PROCEDURE — 99214 OFFICE O/P EST MOD 30 MIN: CPT | Performed by: INTERNAL MEDICINE

## 2025-02-19 PROCEDURE — G8419 CALC BMI OUT NRM PARAM NOF/U: HCPCS | Performed by: INTERNAL MEDICINE

## 2025-02-19 PROCEDURE — G8427 DOCREV CUR MEDS BY ELIG CLIN: HCPCS | Performed by: INTERNAL MEDICINE

## 2025-02-19 PROCEDURE — 1036F TOBACCO NON-USER: CPT | Performed by: INTERNAL MEDICINE

## 2025-02-19 RX ORDER — ALBUTEROL SULFATE 90 UG/1
2 INHALANT RESPIRATORY (INHALATION) EVERY 6 HOURS PRN
Qty: 1 EACH | Refills: 11 | Status: SHIPPED | OUTPATIENT
Start: 2025-02-19

## 2025-02-19 RX ORDER — MOMETASONE FUROATE 200 UG/1
1 AEROSOL RESPIRATORY (INHALATION) 2 TIMES DAILY
Qty: 1 EACH | Refills: 11 | Status: SHIPPED | OUTPATIENT
Start: 2025-02-19

## 2025-02-19 NOTE — PROGRESS NOTES
Name:  Irene Tran  YOB: 1980   MRN: [id number]      Office Visit: 2/19/2025       Assessment & Plan    (Medical Decision Making)    Impression: 44 y.o. female with a history of never smoking, childhood asthma, seen for symptoms suggestive of asthma vs anxiety.   Incidentally discovered R apical ground glass nodule May 2024. Stable on repeat CT Dec 2024.   cPFT\"s show significantly reduced DLCO with otherwise normal values.   TTE with normal EF, RVSP 33.   Cough is her main complaint at present. No better after stopping lisinopril and no symptoms of poorly controlled GERD or post nasal drip.   Unfortunately never got her intended inhaled steroid    -Have rewritten prescription for Asmanex inhaler and send it to a different pharmacy.  -Instructed her to let us know if she has any issues getting in this within the next week so we can send it to different disease which is different inhalers at this point is not on her formulary.  -She feels no better after a solid trial of Asmanex and she continues to have shortness of breath would consider right heart catheterization to fully rule out pulmonary hypertension as the main  of her symptoms though this seems less likely at present.  -Based on Fleischner Society criteria for a 7 mm groundglass opacity in the low risk patient after a 6-month stable repeat only needs another CT scan in 18 to 24 months.  Will plan to perform this June 2026  -If none of this helps we can consider medications targeting either postnasal drip or GERD though these are both currently well-controlled per her report.      F/u in 3 months.     1. Chronic cough    2. Moderate persistent asthma without complication    3. Lung nodule    4. Diffusion capacity of lung (dl), decreased        Orders Placed This Encounter   Medications    mometasone (ASMANEX HFA) 200 MCG/ACT AERO inhaler     Sig: Inhale 1 puff into the lungs 2 times daily     Dispense:  1 each     Refill:  11

## 2025-02-23 ENCOUNTER — APPOINTMENT (OUTPATIENT)
Dept: CT IMAGING | Age: 45
End: 2025-02-23
Payer: MEDICARE

## 2025-02-23 ENCOUNTER — APPOINTMENT (OUTPATIENT)
Dept: ULTRASOUND IMAGING | Age: 45
End: 2025-02-23
Payer: MEDICARE

## 2025-02-23 ENCOUNTER — HOSPITAL ENCOUNTER (EMERGENCY)
Age: 45
Discharge: HOME | End: 2025-02-24
Payer: MEDICARE

## 2025-02-23 DIAGNOSIS — R10.84 GENERALIZED ABDOMINAL PAIN: Primary | ICD-10-CM

## 2025-02-23 DIAGNOSIS — N30.01 ACUTE CYSTITIS WITH HEMATURIA: ICD-10-CM

## 2025-02-23 LAB
ANION GAP SERPL CALC-SCNC: 13 MMOL/L (ref 7–16)
BACTERIA URNS QL MICRO: ABNORMAL /HPF
BASOPHILS # BLD: 0.03 K/UL (ref 0–0.2)
BASOPHILS NFR BLD: 0.4 % (ref 0–2)
BILIRUB UR QL: NEGATIVE
BUN SERPL-MCNC: 16 MG/DL (ref 6–23)
CALCIUM SERPL-MCNC: 9.2 MG/DL (ref 8.8–10.2)
CASTS URNS QL MICRO: 0 /LPF
CHLORIDE SERPL-SCNC: 106 MMOL/L (ref 98–107)
CO2 SERPL-SCNC: 23 MMOL/L (ref 20–29)
CREAT SERPL-MCNC: 0.91 MG/DL (ref 0.6–1.1)
CRYSTALS URNS QL MICRO: 0 /LPF
DIFFERENTIAL METHOD BLD: ABNORMAL
EOSINOPHIL # BLD: 0.17 K/UL (ref 0–0.8)
EOSINOPHIL NFR BLD: 2.2 % (ref 0.5–7.8)
EPI CELLS #/AREA URNS HPF: ABNORMAL /HPF
ERYTHROCYTE [DISTWIDTH] IN BLOOD BY AUTOMATED COUNT: 13.4 % (ref 11.9–14.6)
GLUCOSE SERPL-MCNC: 76 MG/DL (ref 70–99)
GLUCOSE UR QL STRIP.AUTO: NEGATIVE MG/DL
HCG UR QL: NEGATIVE
HCT VFR BLD AUTO: 36 % (ref 35.8–46.3)
HGB BLD-MCNC: 12.2 G/DL (ref 11.7–15.4)
IMM GRANULOCYTES # BLD AUTO: 0.03 K/UL (ref 0–0.5)
IMM GRANULOCYTES NFR BLD AUTO: 0.4 % (ref 0–5)
KETONES UR-MCNC: NEGATIVE MG/DL
LEUKOCYTE ESTERASE UR QL STRIP: ABNORMAL
LYMPHOCYTES # BLD: 2.24 K/UL (ref 0.5–4.6)
LYMPHOCYTES NFR BLD: 28.9 % (ref 13–44)
MCH RBC QN AUTO: 28.9 PG (ref 26.1–32.9)
MCHC RBC AUTO-ENTMCNC: 33.9 G/DL (ref 31.4–35)
MCV RBC AUTO: 85.3 FL (ref 82–102)
MONOCYTES # BLD: 0.57 K/UL (ref 0.1–1.3)
MONOCYTES NFR BLD: 7.4 % (ref 4–12)
MUCOUS THREADS URNS QL MICRO: 0 /LPF
NEUTS SEG # BLD: 4.7 K/UL (ref 1.7–8.2)
NEUTS SEG NFR BLD: 60.7 % (ref 43–78)
NITRITE UR QL: NEGATIVE
NRBC # BLD: 0 K/UL (ref 0–0.2)
OTHER OBSERVATIONS: ABNORMAL
PH UR: 6 (ref 5–9)
PLATELET # BLD AUTO: 292 K/UL (ref 150–450)
PMV BLD AUTO: 9 FL (ref 9.4–12.3)
POTASSIUM SERPL-SCNC: 4.7 MMOL/L (ref 3.5–5.1)
PROT UR QL: 30 MG/DL
RBC # BLD AUTO: 4.22 M/UL (ref 4.05–5.2)
RBC # UR STRIP: ABNORMAL
RBC #/AREA URNS HPF: >100 /HPF
SERVICE CMNT-IMP: ABNORMAL
SERVICE CMNT-IMP: NORMAL
SODIUM SERPL-SCNC: 142 MMOL/L (ref 136–145)
SP GR UR: 1.01 (ref 1–1.02)
UROBILINOGEN UR QL: 0.2 EU/DL (ref 0.2–1)
WBC # BLD AUTO: 7.7 K/UL (ref 4.3–11.1)
WBC URNS QL MICRO: >100 /HPF
WET PREP GENITAL: NORMAL

## 2025-02-23 PROCEDURE — 6370000000 HC RX 637 (ALT 250 FOR IP): Performed by: NURSE PRACTITIONER

## 2025-02-23 PROCEDURE — 2500000003 HC RX 250 WO HCPCS: Performed by: NURSE PRACTITIONER

## 2025-02-23 PROCEDURE — 99285 EMERGENCY DEPT VISIT HI MDM: CPT

## 2025-02-23 PROCEDURE — 87210 SMEAR WET MOUNT SALINE/INK: CPT

## 2025-02-23 PROCEDURE — 87086 URINE CULTURE/COLONY COUNT: CPT

## 2025-02-23 PROCEDURE — 81025 URINE PREGNANCY TEST: CPT

## 2025-02-23 PROCEDURE — 80048 BASIC METABOLIC PNL TOTAL CA: CPT

## 2025-02-23 PROCEDURE — 76830 TRANSVAGINAL US NON-OB: CPT

## 2025-02-23 PROCEDURE — 74177 CT ABD & PELVIS W/CONTRAST: CPT

## 2025-02-23 PROCEDURE — 87186 SC STD MICRODIL/AGAR DIL: CPT

## 2025-02-23 PROCEDURE — 6360000002 HC RX W HCPCS: Performed by: NURSE PRACTITIONER

## 2025-02-23 PROCEDURE — 81001 URINALYSIS AUTO W/SCOPE: CPT

## 2025-02-23 PROCEDURE — 76857 US EXAM PELVIC LIMITED: CPT

## 2025-02-23 PROCEDURE — 87088 URINE BACTERIA CULTURE: CPT

## 2025-02-23 PROCEDURE — 96376 TX/PRO/DX INJ SAME DRUG ADON: CPT

## 2025-02-23 PROCEDURE — 96374 THER/PROPH/DIAG INJ IV PUSH: CPT

## 2025-02-23 PROCEDURE — 96372 THER/PROPH/DIAG INJ SC/IM: CPT

## 2025-02-23 PROCEDURE — 96375 TX/PRO/DX INJ NEW DRUG ADDON: CPT

## 2025-02-23 PROCEDURE — 85025 COMPLETE CBC W/AUTO DIFF WBC: CPT

## 2025-02-23 PROCEDURE — 87591 N.GONORRHOEAE DNA AMP PROB: CPT

## 2025-02-23 PROCEDURE — 87491 CHLMYD TRACH DNA AMP PROBE: CPT

## 2025-02-23 PROCEDURE — 6360000004 HC RX CONTRAST MEDICATION: Performed by: NURSE PRACTITIONER

## 2025-02-23 RX ORDER — MORPHINE SULFATE 4 MG/ML
4 INJECTION, SOLUTION INTRAMUSCULAR; INTRAVENOUS ONCE
Status: COMPLETED | OUTPATIENT
Start: 2025-02-23 | End: 2025-02-23

## 2025-02-23 RX ORDER — IOPAMIDOL 755 MG/ML
100 INJECTION, SOLUTION INTRAVASCULAR
Status: COMPLETED | OUTPATIENT
Start: 2025-02-23 | End: 2025-02-23

## 2025-02-23 RX ORDER — ONDANSETRON 2 MG/ML
4 INJECTION INTRAMUSCULAR; INTRAVENOUS
Status: COMPLETED | OUTPATIENT
Start: 2025-02-23 | End: 2025-02-23

## 2025-02-23 RX ORDER — KETOROLAC TROMETHAMINE 30 MG/ML
30 INJECTION, SOLUTION INTRAMUSCULAR; INTRAVENOUS ONCE
Status: COMPLETED | OUTPATIENT
Start: 2025-02-23 | End: 2025-02-23

## 2025-02-23 RX ORDER — DOXYCYCLINE 100 MG/1
100 CAPSULE ORAL
Status: COMPLETED | OUTPATIENT
Start: 2025-02-24 | End: 2025-02-23

## 2025-02-23 RX ADMIN — MORPHINE SULFATE 4 MG: 4 INJECTION, SOLUTION INTRAMUSCULAR; INTRAVENOUS at 21:47

## 2025-02-23 RX ADMIN — DOXYCYCLINE HYCLATE 100 MG: 100 CAPSULE ORAL at 23:58

## 2025-02-23 RX ADMIN — KETOROLAC TROMETHAMINE 30 MG: 30 INJECTION, SOLUTION INTRAMUSCULAR at 17:53

## 2025-02-23 RX ADMIN — WATER 2000 MG: 1 INJECTION INTRAMUSCULAR; INTRAVENOUS; SUBCUTANEOUS at 20:15

## 2025-02-23 RX ADMIN — MORPHINE SULFATE 4 MG: 4 INJECTION, SOLUTION INTRAMUSCULAR; INTRAVENOUS at 19:40

## 2025-02-23 RX ADMIN — IOPAMIDOL 100 ML: 755 INJECTION, SOLUTION INTRAVENOUS at 19:53

## 2025-02-23 RX ADMIN — ONDANSETRON 4 MG: 2 INJECTION, SOLUTION INTRAMUSCULAR; INTRAVENOUS at 17:53

## 2025-02-23 RX ADMIN — LIDOCAINE HYDROCHLORIDE 1000 MG: 10 INJECTION, SOLUTION INFILTRATION; PERINEURAL at 23:58

## 2025-02-23 ASSESSMENT — PAIN - FUNCTIONAL ASSESSMENT
PAIN_FUNCTIONAL_ASSESSMENT: 0-10
PAIN_FUNCTIONAL_ASSESSMENT: 0-10

## 2025-02-23 ASSESSMENT — PAIN SCALES - GENERAL
PAINLEVEL_OUTOF10: 2
PAINLEVEL_OUTOF10: 9
PAINLEVEL_OUTOF10: 10
PAINLEVEL_OUTOF10: 10

## 2025-02-23 ASSESSMENT — LIFESTYLE VARIABLES
HOW OFTEN DO YOU HAVE A DRINK CONTAINING ALCOHOL: 2-4 TIMES A MONTH
HOW MANY STANDARD DRINKS CONTAINING ALCOHOL DO YOU HAVE ON A TYPICAL DAY: 1 OR 2

## 2025-02-23 NOTE — ED TRIAGE NOTES
PT ambulatory to triage with dysuria/hematuria and lower back pain.    PT reports pain/discomfort x2 weeks but hematuria started in the last few days.     PT reports to a grayish discharge since Thursday with lower abdominal/pelvic pain with mild nausea    PT was seen at her PCP for same c/o- was given RX's at that time.

## 2025-02-23 NOTE — ED PROVIDER NOTES
Emergency Department Provider Note       PCP: Isidro Camargo MD   Age: 44 y.o.   Sex: female     DISPOSITION                  ICD-10-CM    1. Generalized abdominal pain  R10.84       2. Acute cystitis with hematuria  N30.01           Medical Decision Making     Irene is a 44-year-old female with history of hypertension, hyperlipidemia and pulmonary hypertension, presenting to the ED for evaluation of a 2-week history of left flank pain and left abdominal pain.  Patient is ill-appearing but nontoxic.  Her vitals are stable today.  My differential considered but not limited to kidney stone, pyelonephritis, acute cystitis, STD, pregnancy, hemorrhagic cyst.  Less likely malignancy, acute appendicitis, diverticulitis, pancreatitis, sepsis.  For workup we will do basic blood work, urine studies, pregnancy test, GC/chlamydia, CT scan with contrast of the abdomen and pelvis and I will treat her pain and nausea with Toradol and Zofran and reassess.  She can tolerate p.o. fluids so I will withhold IV fluids at this time.      7:36 PM  Patient has still not been able to go to CT scan.  We are calling to evaluate her status.  Her urine does look infected with greater than 100 white blood cells, greater than 100 red blood cells, and +3 bacteria.  Will send urine for culture.  No leukocytosis on her blood work and no renal dysfunction.  Will start her on a dose of cefepime.  She has an allergy to penicillins.  Initially gave her Toradol.  She is complaining of pain at this time.  Will give her a dose of morphine.    8:41 PM  I spoke with Dr. Dubois with radiology who notes that patient has an enlarged right ovary and would recommend an ultrasound to rule out torsion.  No other significant etiology identified.  She did mention viral enteritis but no evidence of pyelonephritis.  Ultrasound ordered.    9:40 PM  Patient continues to be in pain 8 out of 10.  I will order another dose of morphine.  I did a pelvic exam with

## 2025-02-24 VITALS
WEIGHT: 165 LBS | DIASTOLIC BLOOD PRESSURE: 76 MMHG | SYSTOLIC BLOOD PRESSURE: 142 MMHG | OXYGEN SATURATION: 98 % | HEIGHT: 63 IN | BODY MASS INDEX: 29.23 KG/M2 | TEMPERATURE: 99 F | HEART RATE: 68 BPM | RESPIRATION RATE: 18 BRPM

## 2025-02-24 RX ORDER — ONDANSETRON 4 MG/1
4 TABLET, ORALLY DISINTEGRATING ORAL 3 TIMES DAILY PRN
Qty: 21 TABLET | Refills: 0 | Status: SHIPPED | OUTPATIENT
Start: 2025-02-24

## 2025-02-24 RX ORDER — DOXYCYCLINE HYCLATE 100 MG
100 TABLET ORAL 2 TIMES DAILY
Qty: 14 TABLET | Refills: 0 | Status: SHIPPED | OUTPATIENT
Start: 2025-02-24 | End: 2025-03-03

## 2025-02-24 RX ORDER — CEPHALEXIN 500 MG/1
500 CAPSULE ORAL 2 TIMES DAILY
Qty: 14 CAPSULE | Refills: 0 | Status: SHIPPED | OUTPATIENT
Start: 2025-02-24 | End: 2025-03-03

## 2025-02-24 NOTE — ED NOTES
I have reviewed discharge instructions with the patient.  The patient verbalized understanding.    Patient left ED via Discharge Method: ambulatory to Home with self.    Opportunity for questions and clarification provided.       Patient given 3 scripts.         To continue your aftercare when you leave the hospital, you may receive an automated call from our care team to check in on how you are doing.  This is a free service and part of our promise to provide the best care and service to meet your aftercare needs.” If you have questions, or wish to unsubscribe from this service please call [phone].  Thank you for Choosing our [facility].

## 2025-02-26 LAB
BACTERIA SPEC CULT: ABNORMAL
C TRACH RRNA SPEC QL NAA+PROBE: NEGATIVE
N GONORRHOEA RRNA SPEC QL NAA+PROBE: NEGATIVE
SERVICE CMNT-IMP: ABNORMAL
SPECIMEN SOURCE: NORMAL

## 2025-02-27 NOTE — PROGRESS NOTES
ED pharmacist reviewed recent results of urine culture.    The patient was treated with ceftriaxone 1g x1 dose in the emergency department and received a prescription for cephalexin upon discharge. Based on culture results, the patient is being adequately treated for the identified infection with existing antimicrobial therapy. No further intervention needed.    Allergies as of 02/23/2025 - Fully Reviewed 02/23/2025   Allergen Reaction Noted    Latex Rash 03/10/2022    Sulfa antibiotics Shortness Of Breath 11/22/2011    Sumatriptan Shortness Of Breath 11/22/2011    Amoxicillin Hives 12/12/2013    Omeprazole Itching 07/18/2015    Omeprazole magnesium Swelling 05/14/2016     Loc Mitchell, HaydeD

## 2025-07-29 ENCOUNTER — TRANSCRIBE ORDERS (OUTPATIENT)
Dept: SCHEDULING | Age: 45
End: 2025-07-29

## 2025-07-29 ENCOUNTER — HOSPITAL ENCOUNTER (EMERGENCY)
Age: 45
Discharge: HOME | End: 2025-07-30
Payer: MEDICARE

## 2025-07-29 ENCOUNTER — APPOINTMENT (OUTPATIENT)
Dept: CT IMAGING | Age: 45
End: 2025-07-29
Payer: MEDICARE

## 2025-07-29 DIAGNOSIS — Z12.31 ENCOUNTER FOR SCREENING MAMMOGRAM FOR MALIGNANT NEOPLASM OF BREAST: Primary | ICD-10-CM

## 2025-07-29 DIAGNOSIS — J35.1 TONSILLAR ENLARGEMENT: ICD-10-CM

## 2025-07-29 DIAGNOSIS — J02.9 ACUTE PHARYNGITIS, UNSPECIFIED ETIOLOGY: Primary | ICD-10-CM

## 2025-07-29 LAB
ALBUMIN SERPL-MCNC: 3.6 G/DL (ref 3.5–5)
ALBUMIN/GLOB SERPL: 1 (ref 1–1.9)
ALP SERPL-CCNC: 70 U/L (ref 35–104)
ALT SERPL-CCNC: 15 U/L (ref 8–45)
ANION GAP SERPL CALC-SCNC: 11 MMOL/L (ref 7–16)
AST SERPL-CCNC: 16 U/L (ref 15–37)
BASOPHILS # BLD: 0.04 K/UL (ref 0–0.2)
BASOPHILS NFR BLD: 0.7 % (ref 0–2)
BILIRUB SERPL-MCNC: 0.2 MG/DL (ref 0–1.2)
BUN SERPL-MCNC: 12 MG/DL (ref 6–23)
CALCIUM SERPL-MCNC: 9.5 MG/DL (ref 8.8–10.2)
CHLORIDE SERPL-SCNC: 107 MMOL/L (ref 98–107)
CO2 SERPL-SCNC: 19 MMOL/L (ref 20–29)
CREAT SERPL-MCNC: 0.9 MG/DL (ref 0.6–1.1)
DIFFERENTIAL METHOD BLD: ABNORMAL
EOSINOPHIL # BLD: 0.21 K/UL (ref 0–0.8)
EOSINOPHIL NFR BLD: 3.4 % (ref 0.5–7.8)
ERYTHROCYTE [DISTWIDTH] IN BLOOD BY AUTOMATED COUNT: 13.1 % (ref 11.9–14.6)
GLOBULIN SER CALC-MCNC: 3.7 G/DL (ref 2.3–3.5)
GLUCOSE SERPL-MCNC: 138 MG/DL (ref 70–99)
HCG UR QL: NEGATIVE
HCT VFR BLD AUTO: 36 % (ref 35.8–46.3)
HGB BLD-MCNC: 12.5 G/DL (ref 11.7–15.4)
IMM GRANULOCYTES # BLD AUTO: 0.01 K/UL (ref 0–0.5)
IMM GRANULOCYTES NFR BLD AUTO: 0.2 % (ref 0–5)
LYMPHOCYTES # BLD: 2.33 K/UL (ref 0.5–4.6)
LYMPHOCYTES NFR BLD: 38.3 % (ref 13–44)
MCH RBC QN AUTO: 29.4 PG (ref 26.1–32.9)
MCHC RBC AUTO-ENTMCNC: 34.7 G/DL (ref 31.4–35)
MCV RBC AUTO: 84.7 FL (ref 82–102)
MONOCYTES # BLD: 0.47 K/UL (ref 0.1–1.3)
MONOCYTES NFR BLD: 7.7 % (ref 4–12)
NEUTS SEG # BLD: 3.03 K/UL (ref 1.7–8.2)
NEUTS SEG NFR BLD: 49.7 % (ref 43–78)
NRBC # BLD: 0 K/UL (ref 0–0.2)
PLATELET # BLD AUTO: 304 K/UL (ref 150–450)
PMV BLD AUTO: 8.9 FL (ref 9.4–12.3)
POTASSIUM SERPL-SCNC: 4.2 MMOL/L (ref 3.5–5.1)
PROT SERPL-MCNC: 7.4 G/DL (ref 6.3–8.2)
RBC # BLD AUTO: 4.25 M/UL (ref 4.05–5.2)
SODIUM SERPL-SCNC: 137 MMOL/L (ref 136–145)
STREP, MOLECULAR: NOT DETECTED
WBC # BLD AUTO: 6.1 K/UL (ref 4.3–11.1)

## 2025-07-29 PROCEDURE — 85025 COMPLETE CBC W/AUTO DIFF WBC: CPT

## 2025-07-29 PROCEDURE — 87651 STREP A DNA AMP PROBE: CPT

## 2025-07-29 PROCEDURE — 70491 CT SOFT TISSUE NECK W/DYE: CPT

## 2025-07-29 PROCEDURE — 80053 COMPREHEN METABOLIC PANEL: CPT

## 2025-07-29 PROCEDURE — 6360000004 HC RX CONTRAST MEDICATION

## 2025-07-29 PROCEDURE — 99285 EMERGENCY DEPT VISIT HI MDM: CPT

## 2025-07-29 PROCEDURE — 81025 URINE PREGNANCY TEST: CPT

## 2025-07-29 RX ORDER — LIDOCAINE HYDROCHLORIDE 20 MG/ML
15 SOLUTION OROPHARYNGEAL
Status: COMPLETED | OUTPATIENT
Start: 2025-07-29 | End: 2025-07-30

## 2025-07-29 RX ORDER — KETOROLAC TROMETHAMINE 15 MG/ML
15 INJECTION, SOLUTION INTRAMUSCULAR; INTRAVENOUS
Status: COMPLETED | OUTPATIENT
Start: 2025-07-29 | End: 2025-07-30

## 2025-07-29 RX ORDER — IOPAMIDOL 755 MG/ML
100 INJECTION, SOLUTION INTRAVASCULAR
Status: COMPLETED | OUTPATIENT
Start: 2025-07-29 | End: 2025-07-29

## 2025-07-29 RX ADMIN — IOPAMIDOL 100 ML: 755 INJECTION, SOLUTION INTRAVENOUS at 23:36

## 2025-07-29 ASSESSMENT — LIFESTYLE VARIABLES
HOW OFTEN DO YOU HAVE A DRINK CONTAINING ALCOHOL: NEVER
HOW MANY STANDARD DRINKS CONTAINING ALCOHOL DO YOU HAVE ON A TYPICAL DAY: PATIENT DOES NOT DRINK

## 2025-07-29 ASSESSMENT — PAIN DESCRIPTION - ORIENTATION: ORIENTATION: LEFT

## 2025-07-29 ASSESSMENT — PAIN - FUNCTIONAL ASSESSMENT: PAIN_FUNCTIONAL_ASSESSMENT: 0-10

## 2025-07-29 ASSESSMENT — PAIN DESCRIPTION - LOCATION: LOCATION: MOUTH

## 2025-07-29 ASSESSMENT — PAIN SCALES - GENERAL: PAINLEVEL_OUTOF10: 10

## 2025-07-30 VITALS
DIASTOLIC BLOOD PRESSURE: 83 MMHG | RESPIRATION RATE: 16 BRPM | OXYGEN SATURATION: 100 % | WEIGHT: 170 LBS | HEIGHT: 64 IN | HEART RATE: 57 BPM | SYSTOLIC BLOOD PRESSURE: 136 MMHG | BODY MASS INDEX: 29.02 KG/M2 | TEMPERATURE: 98.9 F

## 2025-07-30 LAB
EKG ATRIAL RATE: 52 BPM
EKG DIAGNOSIS: NORMAL
EKG P AXIS: 63 DEGREES
EKG P-R INTERVAL: 168 MS
EKG Q-T INTERVAL: 466 MS
EKG QRS DURATION: 62 MS
EKG QTC CALCULATION (BAZETT): 433 MS
EKG R AXIS: 60 DEGREES
EKG T AXIS: 57 DEGREES
EKG VENTRICULAR RATE: 52 BPM

## 2025-07-30 PROCEDURE — 93005 ELECTROCARDIOGRAM TRACING: CPT

## 2025-07-30 PROCEDURE — 6360000002 HC RX W HCPCS

## 2025-07-30 PROCEDURE — 96375 TX/PRO/DX INJ NEW DRUG ADDON: CPT

## 2025-07-30 PROCEDURE — 96365 THER/PROPH/DIAG IV INF INIT: CPT

## 2025-07-30 PROCEDURE — 93010 ELECTROCARDIOGRAM REPORT: CPT | Performed by: INTERNAL MEDICINE

## 2025-07-30 PROCEDURE — 6370000000 HC RX 637 (ALT 250 FOR IP)

## 2025-07-30 RX ORDER — DEXAMETHASONE SODIUM PHOSPHATE 10 MG/ML
10 INJECTION, SOLUTION INTRA-ARTICULAR; INTRALESIONAL; INTRAMUSCULAR; INTRAVENOUS; SOFT TISSUE
Status: COMPLETED | OUTPATIENT
Start: 2025-07-30 | End: 2025-07-30

## 2025-07-30 RX ORDER — CLINDAMYCIN PHOSPHATE 600 MG/50ML
600 INJECTION, SOLUTION INTRAVENOUS ONCE
Status: COMPLETED | OUTPATIENT
Start: 2025-07-30 | End: 2025-07-30

## 2025-07-30 RX ORDER — CLINDAMYCIN HYDROCHLORIDE 300 MG/1
300 CAPSULE ORAL 3 TIMES DAILY
Qty: 21 CAPSULE | Refills: 0 | Status: SHIPPED | OUTPATIENT
Start: 2025-07-30 | End: 2025-08-06

## 2025-07-30 RX ORDER — ONDANSETRON 2 MG/ML
4 INJECTION INTRAMUSCULAR; INTRAVENOUS
Status: COMPLETED | OUTPATIENT
Start: 2025-07-30 | End: 2025-07-30

## 2025-07-30 RX ORDER — MORPHINE SULFATE 4 MG/ML
4 INJECTION, SOLUTION INTRAMUSCULAR; INTRAVENOUS ONCE
Refills: 0 | Status: COMPLETED | OUTPATIENT
Start: 2025-07-30 | End: 2025-07-30

## 2025-07-30 RX ORDER — LIDOCAINE HYDROCHLORIDE 20 MG/ML
15 SOLUTION OROPHARYNGEAL
Qty: 100 ML | Refills: 0 | Status: SHIPPED | OUTPATIENT
Start: 2025-07-30

## 2025-07-30 RX ORDER — PREDNISONE 20 MG/1
20 TABLET ORAL 2 TIMES DAILY
Qty: 10 TABLET | Refills: 0 | Status: SHIPPED | OUTPATIENT
Start: 2025-07-30 | End: 2025-08-04

## 2025-07-30 RX ADMIN — ONDANSETRON 4 MG: 2 INJECTION, SOLUTION INTRAMUSCULAR; INTRAVENOUS at 01:42

## 2025-07-30 RX ADMIN — DEXAMETHASONE SODIUM PHOSPHATE 10 MG: 10 INJECTION INTRAMUSCULAR; INTRAVENOUS at 01:42

## 2025-07-30 RX ADMIN — LIDOCAINE HYDROCHLORIDE 15 ML: 20 SOLUTION ORAL at 00:07

## 2025-07-30 RX ADMIN — MORPHINE SULFATE 4 MG: 4 INJECTION, SOLUTION INTRAMUSCULAR; INTRAVENOUS at 01:42

## 2025-07-30 RX ADMIN — CLINDAMYCIN PHOSPHATE 600 MG: 600 INJECTION, SOLUTION INTRAVENOUS at 01:42

## 2025-07-30 RX ADMIN — KETOROLAC TROMETHAMINE 15 MG: 15 INJECTION, SOLUTION INTRAMUSCULAR; INTRAVENOUS at 00:07

## 2025-07-30 ASSESSMENT — PAIN DESCRIPTION - LOCATION
LOCATION: THROAT;EAR
LOCATION: THROAT
LOCATION: EAR;THROAT

## 2025-07-30 ASSESSMENT — ENCOUNTER SYMPTOMS
SORE THROAT: 1
TROUBLE SWALLOWING: 0
ABDOMINAL PAIN: 0
SHORTNESS OF BREATH: 0
VOICE CHANGE: 0

## 2025-07-30 ASSESSMENT — PAIN SCALES - GENERAL
PAINLEVEL_OUTOF10: 9
PAINLEVEL_OUTOF10: 9
PAINLEVEL_OUTOF10: 6
PAINLEVEL_OUTOF10: 10

## 2025-07-30 ASSESSMENT — PAIN DESCRIPTION - ORIENTATION
ORIENTATION: LEFT
ORIENTATION: LEFT

## 2025-07-30 NOTE — DISCHARGE INSTRUCTIONS
As discussed your workup today in the emergency department was reassuring, there was no evidence of abscess on your CT.  I have started you on a course of antibiotics, please take as prescribed.  I have also started you on a course of steroids.  Please use over-the-counter pain medication as needed for your pain.  I have also prescribed viscous lidocaine to use as needed.  Please follow-up with the attached referral to ENT for further examination of your symptoms.  If your symptoms change or worsen please return to the ER.

## 2025-07-30 NOTE — ED PROVIDER NOTES
Emergency Department Provider Note       E EMERGENCY DEPT   PCP: Isidro Camargo MD   Age: 45 y.o.   Sex: female     DISPOSITION Discharge - Pending Orders Complete 07/30/2025 01:16:48 AM    ICD-10-CM    1. Acute pharyngitis, unspecified etiology  J02.9 [facility]      2. Tonsillar enlargement  J35.1 [facility]          Medical Decision Making     Patient is a 45-year-old female with no relevant past medical history presenting emergency department the chief complaint of pharyngitis and left otalgia.  Physical exam notable for left-sided posterior oropharynx erythema.  No evidence of peritonsillar or tonsillar abscess.  There is no submandibular swelling present.  No evidence of respiratory distress, patient able to swallow oral secretions.  O2 sat stable at 98%.  Will obtain CT soft tissue neck to further evaluate.  No visualized otitis media on physical exam.  There is no evidence of otitis externa on physical exam, no reproducible pain with movement of the tragus.  No reproducible tenderness with palpation of the mastoid bone.  No adventitious sounds heard upon auscultation of the lungs.     CBC and CMP within normal limits.  Pregnancy test negative.  Strep swab negative.  CT soft tissue neck notes mild left tonsillar enlargement without evidence of abscess.  No other acute finding per radiology.  Will provide patient with dose of clindamycin in ER as well as steroid.  At this time she is suitable for outpatient follow-up given reassuring workup.  No evidence of peritonsillar/tonsillar abscess, retropharyngeal abscess or epiglottitis per radiology on CT.  O2 sat stable at 100%.  Patient provided with referral to ENT for recheck in the next week.  Patient started on course of clindamycin and steroid.  Discussed supportive care with patient.  Upon discharge, patient began having panic attack, she states she has longstanding history of panic attacks and episode today

## 2025-07-30 NOTE — ED TRIAGE NOTES
Patient presents to ER for left sided mouth pain, sore throat, and left ear pain. Patient states this has been going on for 5 days and is experiencing drainage from ear and mouth.

## 2025-09-03 ENCOUNTER — TELEPHONE (OUTPATIENT)
Dept: OBGYN CLINIC | Age: 45
End: 2025-09-03

## 2025-09-04 ENCOUNTER — PROCEDURE VISIT (OUTPATIENT)
Dept: OBGYN CLINIC | Age: 45
End: 2025-09-04
Payer: MEDICARE

## 2025-09-04 ENCOUNTER — OFFICE VISIT (OUTPATIENT)
Dept: OBGYN CLINIC | Age: 45
End: 2025-09-04
Payer: MEDICARE

## 2025-09-04 VITALS
WEIGHT: 165.9 LBS | BODY MASS INDEX: 28.32 KG/M2 | SYSTOLIC BLOOD PRESSURE: 112 MMHG | HEIGHT: 64 IN | DIASTOLIC BLOOD PRESSURE: 74 MMHG

## 2025-09-04 DIAGNOSIS — N89.8 VAGINAL DISCHARGE: ICD-10-CM

## 2025-09-04 DIAGNOSIS — R10.2 PELVIC PAIN: Primary | ICD-10-CM

## 2025-09-04 DIAGNOSIS — N92.6 IRREGULAR BLEEDING: ICD-10-CM

## 2025-09-04 PROCEDURE — 76830 TRANSVAGINAL US NON-OB: CPT | Performed by: OBSTETRICS & GYNECOLOGY

## 2025-09-04 PROCEDURE — G8427 DOCREV CUR MEDS BY ELIG CLIN: HCPCS | Performed by: OBSTETRICS & GYNECOLOGY

## 2025-09-04 PROCEDURE — 1036F TOBACCO NON-USER: CPT | Performed by: OBSTETRICS & GYNECOLOGY

## 2025-09-04 PROCEDURE — 99214 OFFICE O/P EST MOD 30 MIN: CPT | Performed by: OBSTETRICS & GYNECOLOGY

## 2025-09-04 PROCEDURE — G8419 CALC BMI OUT NRM PARAM NOF/U: HCPCS | Performed by: OBSTETRICS & GYNECOLOGY

## 2025-09-04 RX ORDER — MEDROXYPROGESTERONE ACETATE 150 MG/ML
150 INJECTION, SUSPENSION INTRAMUSCULAR ONCE
Qty: 1 ML | Refills: 3 | Status: SHIPPED | OUTPATIENT
Start: 2025-09-04 | End: 2025-09-04

## 2025-09-04 RX ORDER — CIPROFLOXACIN 500 MG/1
500 TABLET, FILM COATED ORAL 2 TIMES DAILY
Qty: 14 TABLET | Refills: 0 | Status: SHIPPED | OUTPATIENT
Start: 2025-09-04 | End: 2025-09-11

## 2025-09-04 RX ORDER — METRONIDAZOLE 500 MG/1
500 TABLET ORAL 2 TIMES DAILY
Qty: 14 TABLET | Refills: 0 | Status: SHIPPED | OUTPATIENT
Start: 2025-09-04 | End: 2025-09-11

## (undated) DEVICE — SOLUTION IRRIG 3000ML 0.9% SOD CHL FLX CONT 0797208] ICU MEDICAL INC]

## (undated) DEVICE — GUARDIAN LVC: Brand: GUARDIAN

## (undated) DEVICE — BLADE SHV CUT MENIS AGG + 4MM --

## (undated) DEVICE — SLING ARM SWTH UNIV FOAM 1 SZ FITS MOST

## (undated) DEVICE — 3M™ STERI-DRAPE™ INCISE DRAPE 1050 (60CM X 45CM): Brand: STERI-DRAPE™

## (undated) DEVICE — BUR SHV CUT HLLW 6 FLUT 5.5MM --

## (undated) DEVICE — SUTURE ETHLN SZ 2-0 L18IN NONABSORBABLE BLK L26MM PS 3/8 585H

## (undated) DEVICE — SUTURE N ABSRB BRAIDED 2-0 MO-6 39 IN 26 MM 1/2 CIR BLU BLK 3910900023

## (undated) DEVICE — TUBING, SUCTION, 1/4" X 10', STRAIGHT: Brand: MEDLINE

## (undated) DEVICE — PUDDLEVAC FLOOR SUCTION DEVICE: Brand: PUDDLEVAC

## (undated) DEVICE — SUTURE VCRL SZ 0 L27IN ABSRB UD L36MM CP-1 1/2 CIR REV CUT J267H

## (undated) DEVICE — INFLOW CASSETTE TUBING, DO NOT USE IF PACKAGE IS DAMAGED: Brand: CROSSFLOW

## (undated) DEVICE — [RESECTOR CUTTER, ARTHROSCOPIC SHAVER BLADE,  DO NOT RESTERILIZE,  DO NOT USE IF PACKAGE IS DAMAGED,  KEEP DRY,  KEEP AWAY FROM SUNLIGHT]: Brand: FORMULA

## (undated) DEVICE — ABDOMINAL PAD: Brand: DERMACEA

## (undated) DEVICE — GOWN,REINFORCED,POLY,AURORA,XXLARGE,STR: Brand: MEDLINE

## (undated) DEVICE — SUTURE PROL SZ 2-0 L18IN NONABSORBABLE BLU FS L26MM 3/8 CIR 8685H

## (undated) DEVICE — OUTFLOW CASSETTE TUBING, DO NOT USE IF PACKAGE IS DAMAGED: Brand: CROSSFLOW

## (undated) DEVICE — 90-S, SUCTION PROBE, NON-BENDABLE, MAX CUT LEVEL 11: Brand: SERFAS ENERGY

## (undated) DEVICE — PREP SKN CHLRAPRP APL 26ML STR --

## (undated) DEVICE — NDL SPNE QNCKE 18GX3.5IN LF --

## (undated) DEVICE — DRAPE,U/SHT,SPLIT,FILM,60X84,STERILE: Brand: MEDLINE

## (undated) DEVICE — CARDINAL HEALTH FLEXIBLE LIGHT HANDLE COVER: Brand: CARDINAL HEALTH

## (undated) DEVICE — PACK,SHOULDER,DRAPE,POUCH: Brand: MEDLINE

## (undated) DEVICE — SURGICAL PROCEDURE PACK BASIC ST FRANCIS